# Patient Record
Sex: MALE | Race: WHITE | NOT HISPANIC OR LATINO
[De-identification: names, ages, dates, MRNs, and addresses within clinical notes are randomized per-mention and may not be internally consistent; named-entity substitution may affect disease eponyms.]

---

## 2017-01-03 ENCOUNTER — MESSAGE (OUTPATIENT)
Age: 62
End: 2017-01-03

## 2017-01-20 ENCOUNTER — RX RENEWAL (OUTPATIENT)
Age: 62
End: 2017-01-20

## 2017-02-03 ENCOUNTER — APPOINTMENT (OUTPATIENT)
Dept: INTERNAL MEDICINE | Facility: CLINIC | Age: 62
End: 2017-02-03

## 2017-02-10 ENCOUNTER — APPOINTMENT (OUTPATIENT)
Dept: INTERNAL MEDICINE | Facility: CLINIC | Age: 62
End: 2017-02-10

## 2017-02-10 VITALS — HEIGHT: 70 IN | BODY MASS INDEX: 34.36 KG/M2 | WEIGHT: 240 LBS

## 2017-02-10 VITALS
DIASTOLIC BLOOD PRESSURE: 80 MMHG | WEIGHT: 240 LBS | HEIGHT: 70 IN | HEART RATE: 82 BPM | SYSTOLIC BLOOD PRESSURE: 125 MMHG | BODY MASS INDEX: 34.36 KG/M2

## 2017-02-10 LAB — INR PPP: 3.2 RATIO

## 2017-02-27 ENCOUNTER — APPOINTMENT (OUTPATIENT)
Dept: OPHTHALMOLOGY | Facility: CLINIC | Age: 62
End: 2017-02-27

## 2017-02-28 ENCOUNTER — RX RENEWAL (OUTPATIENT)
Age: 62
End: 2017-02-28

## 2017-03-10 ENCOUNTER — RX RENEWAL (OUTPATIENT)
Age: 62
End: 2017-03-10

## 2017-03-27 ENCOUNTER — APPOINTMENT (OUTPATIENT)
Dept: INTERNAL MEDICINE | Facility: CLINIC | Age: 62
End: 2017-03-27

## 2017-03-27 ENCOUNTER — NON-APPOINTMENT (OUTPATIENT)
Age: 62
End: 2017-03-27

## 2017-03-27 VITALS
RESPIRATION RATE: 16 BRPM | HEIGHT: 70 IN | SYSTOLIC BLOOD PRESSURE: 132 MMHG | HEART RATE: 76 BPM | BODY MASS INDEX: 34.5 KG/M2 | WEIGHT: 241 LBS | DIASTOLIC BLOOD PRESSURE: 86 MMHG

## 2017-03-27 DIAGNOSIS — Z83.71 FAMILY HISTORY OF COLONIC POLYPS: ICD-10-CM

## 2017-03-27 DIAGNOSIS — Z80.0 FAMILY HISTORY OF MALIGNANT NEOPLASM OF DIGESTIVE ORGANS: ICD-10-CM

## 2017-03-27 LAB
INR PPP: 2 RATIO
QUALITY CONTROL: YES

## 2017-03-30 ENCOUNTER — RESULT REVIEW (OUTPATIENT)
Age: 62
End: 2017-03-30

## 2017-03-30 LAB
ALBUMIN SERPL ELPH-MCNC: 4.4 G/DL
ALP BLD-CCNC: 79 U/L
ALT SERPL-CCNC: 44 U/L
ANION GAP SERPL CALC-SCNC: 25 MMOL/L
APPEARANCE: CLEAR
AST SERPL-CCNC: 34 U/L
BACTERIA: NEGATIVE
BASOPHILS # BLD AUTO: 0.03 K/UL
BASOPHILS NFR BLD AUTO: 0.4 %
BILIRUB SERPL-MCNC: 0.2 MG/DL
BILIRUBIN URINE: NEGATIVE
BLOOD URINE: NEGATIVE
BSA DERIVED: 1.73 M2
BUN SERPL-MCNC: 17 MG/DL
CALCIUM SERPL-MCNC: 9.8 MG/DL
CHLORIDE SERPL-SCNC: 103 MMOL/L
CHOLEST SERPL-MCNC: 206 MG/DL
CHOLEST/HDLC SERPL: 5.6 RATIO
CO2 SERPL-SCNC: 15 MMOL/L
COLOR: YELLOW
CREAT 24H UR-MCNC: 2.4 G/24 H
CREAT 24H UR-MCNC: 2.4 G/24 H
CREAT ?TM UR-MCNC: 104 MG/DL
CREAT ?TM UR-MCNC: 104 MG/DL
CREAT CL 24H UR+SERPL-VRATE: 128 ML/MIN
CREAT SERPL-MCNC: 1.28 MG/DL
CREAT SERPL-MCNC: 1.28 MG/DL
DIGOXIN SERPL-MCNC: 0.4 NG/ML
EOSINOPHIL # BLD AUTO: 0.11 K/UL
EOSINOPHIL NFR BLD AUTO: 1.5 %
GLUCOSE QUALITATIVE U: NORMAL MG/DL
GLUCOSE SERPL-MCNC: 176 MG/DL
HBA1C MFR BLD HPLC: 7.9 %
HCT VFR BLD CALC: 41.1 %
HCV RNA FLD QL NAA+PROBE: NORMAL
HCV RNA SPEC QL PROBE+SIG AMP: NOT DETECTED
HDLC SERPL-MCNC: 37 MG/DL
HGB BLD-MCNC: 14 G/DL
HYALINE CASTS: 1 /LPF
IMM GRANULOCYTES NFR BLD AUTO: 0.6 %
KETONES URINE: NEGATIVE
LDLC SERPL CALC-MCNC: 120 MG/DL
LEUKOCYTE ESTERASE URINE: NEGATIVE
LYMPHOCYTES # BLD AUTO: 1.79 K/UL
LYMPHOCYTES NFR BLD AUTO: 25 %
MAN DIFF?: NORMAL
MCHC RBC-ENTMCNC: 27.9 PG
MCHC RBC-ENTMCNC: 34.1 GM/DL
MCV RBC AUTO: 82 FL
MICROSCOPIC-UA: NORMAL
MONOCYTES # BLD AUTO: 0.88 K/UL
MONOCYTES NFR BLD AUTO: 12.3 %
NEUTROPHILS # BLD AUTO: 4.31 K/UL
NEUTROPHILS NFR BLD AUTO: 60.2 %
NITRITE URINE: NEGATIVE
PH URINE: 5
PLATELET # BLD AUTO: 241 K/UL
POTASSIUM SERPL-SCNC: 5.2 MMOL/L
PROT 24H UR-MRATE: 18 MG/DL
PROT ?TM UR-MCNC: 24 HR
PROT ?TM UR-MCNC: 24 HR
PROT SERPL-MCNC: 7.8 G/DL
PROT UR-MCNC: 410 MG/24 H
PROTEIN URINE: 30 MG/DL
PSA SERPL-MCNC: 0.91 NG/ML
RBC # BLD: 5.01 M/UL
RBC # FLD: 15.4 %
RED BLOOD CELLS URINE: 2 /HPF
SODIUM SERPL-SCNC: 143 MMOL/L
SPECIFIC GRAVITY URINE: 1.03
SPECIMEN VOL 24H UR: 2275 ML
SPECIMEN VOL 24H UR: 2275 ML
SQUAMOUS EPITHELIAL CELLS: 0 /HPF
TRIGL SERPL-MCNC: 244 MG/DL
UROBILINOGEN URINE: NORMAL MG/DL
WBC # FLD AUTO: 7.16 K/UL
WHITE BLOOD CELLS URINE: 1 /HPF

## 2017-04-17 ENCOUNTER — RX RENEWAL (OUTPATIENT)
Age: 62
End: 2017-04-17

## 2017-04-24 ENCOUNTER — APPOINTMENT (OUTPATIENT)
Dept: INTERNAL MEDICINE | Facility: CLINIC | Age: 62
End: 2017-04-24

## 2017-04-24 VITALS
DIASTOLIC BLOOD PRESSURE: 80 MMHG | BODY MASS INDEX: 34.65 KG/M2 | SYSTOLIC BLOOD PRESSURE: 125 MMHG | HEIGHT: 70 IN | HEART RATE: 72 BPM | WEIGHT: 242 LBS | OXYGEN SATURATION: 98 %

## 2017-04-24 LAB — INR PPP: 2.3 RATIO

## 2017-04-25 ENCOUNTER — MESSAGE (OUTPATIENT)
Age: 62
End: 2017-04-25

## 2017-04-25 LAB
ALBUMIN SERPL ELPH-MCNC: 4.1 G/DL
ALP BLD-CCNC: 71 U/L
ALT SERPL-CCNC: 50 U/L
AST SERPL-CCNC: 45 U/L
BILIRUB DIRECT SERPL-MCNC: 0.1 MG/DL
BILIRUB INDIRECT SERPL-MCNC: 0.2 MG/DL
BILIRUB SERPL-MCNC: 0.4 MG/DL
PROT SERPL-MCNC: 7.5 G/DL

## 2017-05-25 ENCOUNTER — APPOINTMENT (OUTPATIENT)
Dept: INTERNAL MEDICINE | Facility: CLINIC | Age: 62
End: 2017-05-25

## 2017-05-25 VITALS
HEART RATE: 90 BPM | BODY MASS INDEX: 34.36 KG/M2 | OXYGEN SATURATION: 99 % | DIASTOLIC BLOOD PRESSURE: 80 MMHG | HEIGHT: 70 IN | WEIGHT: 240 LBS | SYSTOLIC BLOOD PRESSURE: 130 MMHG

## 2017-05-25 LAB — INR PPP: 2.7 RATIO

## 2017-05-26 ENCOUNTER — RESULT REVIEW (OUTPATIENT)
Age: 62
End: 2017-05-26

## 2017-05-26 LAB
ALBUMIN SERPL ELPH-MCNC: 4.2 G/DL
ALP BLD-CCNC: 70 U/L
ALT SERPL-CCNC: 49 U/L
AST SERPL-CCNC: 30 U/L
BILIRUB DIRECT SERPL-MCNC: 0.1 MG/DL
BILIRUB INDIRECT SERPL-MCNC: 0.2 MG/DL
BILIRUB SERPL-MCNC: 0.3 MG/DL
PROT SERPL-MCNC: 7.5 G/DL

## 2017-06-07 ENCOUNTER — FORM ENCOUNTER (OUTPATIENT)
Age: 62
End: 2017-06-07

## 2017-06-08 ENCOUNTER — APPOINTMENT (OUTPATIENT)
Dept: ULTRASOUND IMAGING | Facility: CLINIC | Age: 62
End: 2017-06-08

## 2017-06-08 ENCOUNTER — OUTPATIENT (OUTPATIENT)
Dept: OUTPATIENT SERVICES | Facility: HOSPITAL | Age: 62
LOS: 1 days | End: 2017-06-08
Payer: COMMERCIAL

## 2017-06-08 DIAGNOSIS — R19.01 RIGHT UPPER QUADRANT ABDOMINAL SWELLING, MASS AND LUMP: ICD-10-CM

## 2017-06-08 PROCEDURE — 76700 US EXAM ABDOM COMPLETE: CPT

## 2017-06-09 ENCOUNTER — RESULT REVIEW (OUTPATIENT)
Age: 62
End: 2017-06-09

## 2017-06-30 ENCOUNTER — APPOINTMENT (OUTPATIENT)
Dept: PULMONOLOGY | Facility: CLINIC | Age: 62
End: 2017-06-30

## 2017-07-06 ENCOUNTER — RX RENEWAL (OUTPATIENT)
Age: 62
End: 2017-07-06

## 2017-07-10 ENCOUNTER — APPOINTMENT (OUTPATIENT)
Dept: INTERNAL MEDICINE | Facility: CLINIC | Age: 62
End: 2017-07-10

## 2017-07-19 ENCOUNTER — RX RENEWAL (OUTPATIENT)
Age: 62
End: 2017-07-19

## 2017-08-24 ENCOUNTER — APPOINTMENT (OUTPATIENT)
Dept: INTERNAL MEDICINE | Facility: CLINIC | Age: 62
End: 2017-08-24
Payer: COMMERCIAL

## 2017-08-24 VITALS
BODY MASS INDEX: 32.93 KG/M2 | DIASTOLIC BLOOD PRESSURE: 80 MMHG | WEIGHT: 230 LBS | HEIGHT: 70 IN | SYSTOLIC BLOOD PRESSURE: 125 MMHG | HEART RATE: 82 BPM

## 2017-08-24 LAB — INR PPP: 2.4 RATIO

## 2017-08-24 PROCEDURE — 99213 OFFICE O/P EST LOW 20 MIN: CPT | Mod: 25

## 2017-08-24 PROCEDURE — 85610 PROTHROMBIN TIME: CPT | Mod: QW

## 2017-08-24 PROCEDURE — 36415 COLL VENOUS BLD VENIPUNCTURE: CPT

## 2017-08-25 ENCOUNTER — RESULT REVIEW (OUTPATIENT)
Age: 62
End: 2017-08-25

## 2017-08-25 LAB
ALBUMIN SERPL ELPH-MCNC: 4 G/DL
ALP BLD-CCNC: 61 U/L
ALT SERPL-CCNC: 35 U/L
AST SERPL-CCNC: 37 U/L
BILIRUB DIRECT SERPL-MCNC: 0.1 MG/DL
BILIRUB INDIRECT SERPL-MCNC: 0.2 MG/DL
BILIRUB SERPL-MCNC: 0.3 MG/DL
PROT SERPL-MCNC: 7.2 G/DL

## 2017-08-29 ENCOUNTER — RX RENEWAL (OUTPATIENT)
Age: 62
End: 2017-08-29

## 2017-09-06 ENCOUNTER — RX RENEWAL (OUTPATIENT)
Age: 62
End: 2017-09-06

## 2017-09-14 ENCOUNTER — RX RENEWAL (OUTPATIENT)
Age: 62
End: 2017-09-14

## 2017-10-05 ENCOUNTER — APPOINTMENT (OUTPATIENT)
Dept: INTERNAL MEDICINE | Facility: CLINIC | Age: 62
End: 2017-10-05
Payer: COMMERCIAL

## 2017-10-05 VITALS
HEART RATE: 90 BPM | DIASTOLIC BLOOD PRESSURE: 80 MMHG | WEIGHT: 236 LBS | BODY MASS INDEX: 33.79 KG/M2 | SYSTOLIC BLOOD PRESSURE: 120 MMHG | OXYGEN SATURATION: 98 % | HEIGHT: 70 IN

## 2017-10-05 DIAGNOSIS — R79.89 OTHER SPECIFIED ABNORMAL FINDINGS OF BLOOD CHEMISTRY: ICD-10-CM

## 2017-10-05 LAB — INR PPP: 3.5 RATIO

## 2017-10-05 PROCEDURE — 36415 COLL VENOUS BLD VENIPUNCTURE: CPT

## 2017-10-05 PROCEDURE — G0008: CPT

## 2017-10-05 PROCEDURE — 99215 OFFICE O/P EST HI 40 MIN: CPT | Mod: 25

## 2017-10-05 PROCEDURE — 85610 PROTHROMBIN TIME: CPT | Mod: QW

## 2017-10-05 PROCEDURE — 90686 IIV4 VACC NO PRSV 0.5 ML IM: CPT

## 2017-10-06 ENCOUNTER — RESULT REVIEW (OUTPATIENT)
Age: 62
End: 2017-10-06

## 2017-10-06 LAB
ALBUMIN SERPL ELPH-MCNC: 4 G/DL
ALP BLD-CCNC: 73 U/L
ALT SERPL-CCNC: 41 U/L
ANION GAP SERPL CALC-SCNC: 14 MMOL/L
AST SERPL-CCNC: 36 U/L
BASOPHILS # BLD AUTO: 0.03 K/UL
BASOPHILS NFR BLD AUTO: 0.4 %
BILIRUB SERPL-MCNC: 0.3 MG/DL
BUN SERPL-MCNC: 24 MG/DL
CALCIUM SERPL-MCNC: 9.6 MG/DL
CHLORIDE SERPL-SCNC: 105 MMOL/L
CHOLEST SERPL-MCNC: 119 MG/DL
CHOLEST/HDLC SERPL: 3.7 RATIO
CO2 SERPL-SCNC: 20 MMOL/L
CREAT SERPL-MCNC: 1.6 MG/DL
DIGOXIN SERPL-MCNC: 0.4 NG/ML
EOSINOPHIL # BLD AUTO: 0.21 K/UL
EOSINOPHIL NFR BLD AUTO: 2.5 %
GLUCOSE SERPL-MCNC: 348 MG/DL
HBA1C MFR BLD HPLC: 8.5 %
HCT VFR BLD CALC: 40 %
HDLC SERPL-MCNC: 32 MG/DL
HGB BLD-MCNC: 13.2 G/DL
IMM GRANULOCYTES NFR BLD AUTO: 0.4 %
LDLC SERPL CALC-MCNC: 48 MG/DL
LYMPHOCYTES # BLD AUTO: 1.69 K/UL
LYMPHOCYTES NFR BLD AUTO: 19.9 %
MAGNESIUM SERPL-MCNC: 1.4 MG/DL
MAN DIFF?: NORMAL
MCHC RBC-ENTMCNC: 27.6 PG
MCHC RBC-ENTMCNC: 33 GM/DL
MCV RBC AUTO: 83.5 FL
MONOCYTES # BLD AUTO: 0.71 K/UL
MONOCYTES NFR BLD AUTO: 8.3 %
NEUTROPHILS # BLD AUTO: 5.84 K/UL
NEUTROPHILS NFR BLD AUTO: 68.5 %
PLATELET # BLD AUTO: 246 K/UL
POTASSIUM SERPL-SCNC: 4.8 MMOL/L
PROT SERPL-MCNC: 7.3 G/DL
RBC # BLD: 4.79 M/UL
RBC # FLD: 15.1 %
SODIUM SERPL-SCNC: 139 MMOL/L
TRIGL SERPL-MCNC: 197 MG/DL
TSH SERPL-ACNC: 1.79 UIU/ML
WBC # FLD AUTO: 8.51 K/UL

## 2017-11-14 ENCOUNTER — APPOINTMENT (OUTPATIENT)
Dept: INTERNAL MEDICINE | Facility: CLINIC | Age: 62
End: 2017-11-14
Payer: COMMERCIAL

## 2017-11-14 VITALS
HEART RATE: 86 BPM | DIASTOLIC BLOOD PRESSURE: 80 MMHG | HEIGHT: 70 IN | WEIGHT: 236 LBS | BODY MASS INDEX: 33.79 KG/M2 | SYSTOLIC BLOOD PRESSURE: 130 MMHG

## 2017-11-14 LAB — INR PPP: 2.6 RATIO

## 2017-11-14 PROCEDURE — 36415 COLL VENOUS BLD VENIPUNCTURE: CPT

## 2017-11-14 PROCEDURE — 85610 PROTHROMBIN TIME: CPT | Mod: QW

## 2017-11-14 PROCEDURE — 99214 OFFICE O/P EST MOD 30 MIN: CPT | Mod: 25

## 2017-11-15 ENCOUNTER — FORM ENCOUNTER (OUTPATIENT)
Age: 62
End: 2017-11-15

## 2017-11-16 ENCOUNTER — FORM ENCOUNTER (OUTPATIENT)
Age: 62
End: 2017-11-16

## 2017-11-16 ENCOUNTER — APPOINTMENT (OUTPATIENT)
Dept: ULTRASOUND IMAGING | Facility: CLINIC | Age: 62
End: 2017-11-16
Payer: COMMERCIAL

## 2017-11-16 ENCOUNTER — RESULT REVIEW (OUTPATIENT)
Age: 62
End: 2017-11-16

## 2017-11-16 ENCOUNTER — OUTPATIENT (OUTPATIENT)
Dept: OUTPATIENT SERVICES | Facility: HOSPITAL | Age: 62
LOS: 1 days | End: 2017-11-16
Payer: COMMERCIAL

## 2017-11-16 DIAGNOSIS — Z00.8 ENCOUNTER FOR OTHER GENERAL EXAMINATION: ICD-10-CM

## 2017-11-16 LAB
ANION GAP SERPL CALC-SCNC: 18 MMOL/L
BUN SERPL-MCNC: 20 MG/DL
CALCIUM SERPL-MCNC: 9.8 MG/DL
CHLORIDE SERPL-SCNC: 101 MMOL/L
CO2 SERPL-SCNC: 22 MMOL/L
CREAT SERPL-MCNC: 1.43 MG/DL
GLUCOSE SERPL-MCNC: 195 MG/DL
MAGNESIUM SERPL-MCNC: 1.7 MG/DL
POTASSIUM SERPL-SCNC: 4.4 MMOL/L
SODIUM SERPL-SCNC: 141 MMOL/L

## 2017-11-16 PROCEDURE — 93971 EXTREMITY STUDY: CPT | Mod: 26,RT

## 2017-11-16 PROCEDURE — 93971 EXTREMITY STUDY: CPT

## 2017-11-17 ENCOUNTER — APPOINTMENT (OUTPATIENT)
Dept: ULTRASOUND IMAGING | Facility: CLINIC | Age: 62
End: 2017-11-17
Payer: COMMERCIAL

## 2017-11-17 ENCOUNTER — OUTPATIENT (OUTPATIENT)
Dept: OUTPATIENT SERVICES | Facility: HOSPITAL | Age: 62
LOS: 1 days | End: 2017-11-17
Payer: COMMERCIAL

## 2017-11-17 DIAGNOSIS — Z00.8 ENCOUNTER FOR OTHER GENERAL EXAMINATION: ICD-10-CM

## 2017-11-17 PROCEDURE — 76881 US COMPL JOINT R-T W/IMG: CPT

## 2017-11-17 PROCEDURE — 76881 US COMPL JOINT R-T W/IMG: CPT | Mod: 26

## 2017-11-20 ENCOUNTER — RESULT REVIEW (OUTPATIENT)
Age: 62
End: 2017-11-20

## 2017-12-18 ENCOUNTER — APPOINTMENT (OUTPATIENT)
Dept: INTERNAL MEDICINE | Facility: CLINIC | Age: 62
End: 2017-12-18

## 2017-12-29 ENCOUNTER — APPOINTMENT (OUTPATIENT)
Dept: INTERNAL MEDICINE | Facility: CLINIC | Age: 62
End: 2017-12-29
Payer: COMMERCIAL

## 2017-12-29 ENCOUNTER — RESULT CHARGE (OUTPATIENT)
Age: 62
End: 2017-12-29

## 2017-12-29 VITALS
DIASTOLIC BLOOD PRESSURE: 80 MMHG | WEIGHT: 235 LBS | BODY MASS INDEX: 33.64 KG/M2 | SYSTOLIC BLOOD PRESSURE: 134 MMHG | HEIGHT: 70 IN | RESPIRATION RATE: 12 BRPM | HEART RATE: 76 BPM

## 2017-12-29 DIAGNOSIS — M79.661 PAIN IN RIGHT LOWER LEG: ICD-10-CM

## 2017-12-29 LAB — INR PPP: 2.7 RATIO

## 2017-12-29 PROCEDURE — 99213 OFFICE O/P EST LOW 20 MIN: CPT | Mod: 25

## 2017-12-29 PROCEDURE — 85610 PROTHROMBIN TIME: CPT | Mod: QW

## 2018-01-29 ENCOUNTER — APPOINTMENT (OUTPATIENT)
Dept: INTERNAL MEDICINE | Facility: CLINIC | Age: 63
End: 2018-01-29
Payer: COMMERCIAL

## 2018-01-29 VITALS
HEIGHT: 70 IN | HEART RATE: 68 BPM | BODY MASS INDEX: 34.36 KG/M2 | WEIGHT: 240 LBS | OXYGEN SATURATION: 98 % | SYSTOLIC BLOOD PRESSURE: 125 MMHG | DIASTOLIC BLOOD PRESSURE: 80 MMHG

## 2018-01-29 LAB — INR PPP: 2.7 RATIO

## 2018-01-29 PROCEDURE — 36415 COLL VENOUS BLD VENIPUNCTURE: CPT

## 2018-01-29 PROCEDURE — 85610 PROTHROMBIN TIME: CPT | Mod: QW

## 2018-01-29 PROCEDURE — 99214 OFFICE O/P EST MOD 30 MIN: CPT | Mod: 25

## 2018-01-30 LAB
ALBUMIN SERPL ELPH-MCNC: 4.2 G/DL
ALP BLD-CCNC: 72 U/L
ALT SERPL-CCNC: 38 U/L
ANION GAP SERPL CALC-SCNC: 20 MMOL/L
AST SERPL-CCNC: 27 U/L
BASOPHILS # BLD AUTO: 0.03 K/UL
BASOPHILS NFR BLD AUTO: 0.4 %
BILIRUB SERPL-MCNC: 0.3 MG/DL
BUN SERPL-MCNC: 19 MG/DL
CALCIUM SERPL-MCNC: 9.4 MG/DL
CHLORIDE SERPL-SCNC: 101 MMOL/L
CHOLEST SERPL-MCNC: 122 MG/DL
CHOLEST/HDLC SERPL: 3.8 RATIO
CO2 SERPL-SCNC: 20 MMOL/L
CREAT SERPL-MCNC: 1.37 MG/DL
DIGOXIN SERPL-MCNC: 0.3 NG/ML
EOSINOPHIL # BLD AUTO: 0.17 K/UL
EOSINOPHIL NFR BLD AUTO: 2.1 %
GLUCOSE SERPL-MCNC: 268 MG/DL
HBA1C MFR BLD HPLC: 9.8 %
HCT VFR BLD CALC: 41.4 %
HDLC SERPL-MCNC: 32 MG/DL
HGB BLD-MCNC: 13.3 G/DL
IMM GRANULOCYTES NFR BLD AUTO: 0.7 %
LDLC SERPL CALC-MCNC: 46 MG/DL
LYMPHOCYTES # BLD AUTO: 1.77 K/UL
LYMPHOCYTES NFR BLD AUTO: 22 %
MAGNESIUM SERPL-MCNC: 1.5 MG/DL
MAN DIFF?: NORMAL
MCHC RBC-ENTMCNC: 27 PG
MCHC RBC-ENTMCNC: 32.1 GM/DL
MCV RBC AUTO: 84.1 FL
MONOCYTES # BLD AUTO: 0.71 K/UL
MONOCYTES NFR BLD AUTO: 8.8 %
NEUTROPHILS # BLD AUTO: 5.29 K/UL
NEUTROPHILS NFR BLD AUTO: 66 %
PLATELET # BLD AUTO: 230 K/UL
POTASSIUM SERPL-SCNC: 4.5 MMOL/L
PROT SERPL-MCNC: 7.4 G/DL
RBC # BLD: 4.92 M/UL
RBC # FLD: 15.2 %
SODIUM SERPL-SCNC: 141 MMOL/L
TRIGL SERPL-MCNC: 221 MG/DL
TSH SERPL-ACNC: 2.28 UIU/ML
WBC # FLD AUTO: 8.03 K/UL

## 2018-01-31 ENCOUNTER — RX RENEWAL (OUTPATIENT)
Age: 63
End: 2018-01-31

## 2018-01-31 ENCOUNTER — RESULT REVIEW (OUTPATIENT)
Age: 63
End: 2018-01-31

## 2018-02-01 ENCOUNTER — FORM ENCOUNTER (OUTPATIENT)
Age: 63
End: 2018-02-01

## 2018-02-02 ENCOUNTER — OUTPATIENT (OUTPATIENT)
Dept: OUTPATIENT SERVICES | Facility: HOSPITAL | Age: 63
LOS: 1 days | End: 2018-02-02
Payer: COMMERCIAL

## 2018-02-02 ENCOUNTER — APPOINTMENT (OUTPATIENT)
Dept: INTERNAL MEDICINE | Facility: CLINIC | Age: 63
End: 2018-02-02
Payer: COMMERCIAL

## 2018-02-02 ENCOUNTER — APPOINTMENT (OUTPATIENT)
Dept: CT IMAGING | Facility: CLINIC | Age: 63
End: 2018-02-02
Payer: COMMERCIAL

## 2018-02-02 VITALS
HEIGHT: 70 IN | TEMPERATURE: 97.8 F | DIASTOLIC BLOOD PRESSURE: 80 MMHG | HEART RATE: 83 BPM | OXYGEN SATURATION: 97 % | SYSTOLIC BLOOD PRESSURE: 130 MMHG | WEIGHT: 240 LBS | BODY MASS INDEX: 34.36 KG/M2

## 2018-02-02 DIAGNOSIS — R10.31 RIGHT LOWER QUADRANT PAIN: ICD-10-CM

## 2018-02-02 PROCEDURE — 74177 CT ABD & PELVIS W/CONTRAST: CPT | Mod: 26

## 2018-02-02 PROCEDURE — 74177 CT ABD & PELVIS W/CONTRAST: CPT

## 2018-02-02 PROCEDURE — 99214 OFFICE O/P EST MOD 30 MIN: CPT

## 2018-02-02 RX ORDER — EMPAGLIFLOZIN 10 MG/1
10 TABLET, FILM COATED ORAL
Refills: 0 | Status: DISCONTINUED | COMMUNITY
Start: 2018-01-31 | End: 2018-02-02

## 2018-02-06 ENCOUNTER — APPOINTMENT (OUTPATIENT)
Dept: INTERNAL MEDICINE | Facility: CLINIC | Age: 63
End: 2018-02-06
Payer: COMMERCIAL

## 2018-02-06 ENCOUNTER — FORM ENCOUNTER (OUTPATIENT)
Age: 63
End: 2018-02-06

## 2018-02-06 VITALS
WEIGHT: 234 LBS | DIASTOLIC BLOOD PRESSURE: 80 MMHG | SYSTOLIC BLOOD PRESSURE: 134 MMHG | HEART RATE: 84 BPM | BODY MASS INDEX: 33.5 KG/M2 | HEIGHT: 70 IN

## 2018-02-06 DIAGNOSIS — R10.31 RIGHT LOWER QUADRANT PAIN: ICD-10-CM

## 2018-02-06 LAB — INR PPP: 3.4 RATIO

## 2018-02-06 PROCEDURE — 99214 OFFICE O/P EST MOD 30 MIN: CPT | Mod: 25

## 2018-02-06 PROCEDURE — 36415 COLL VENOUS BLD VENIPUNCTURE: CPT

## 2018-02-06 PROCEDURE — 85610 PROTHROMBIN TIME: CPT | Mod: QW

## 2018-02-06 RX ORDER — MOXIFLOXACIN HYDROCHLORIDE TABLETS, 400 MG 400 MG/1
400 TABLET, FILM COATED ORAL DAILY
Qty: 10 | Refills: 0 | Status: DISCONTINUED | COMMUNITY
Start: 2018-02-02 | End: 2018-02-06

## 2018-02-07 ENCOUNTER — RESULT REVIEW (OUTPATIENT)
Age: 63
End: 2018-02-07

## 2018-02-07 ENCOUNTER — APPOINTMENT (OUTPATIENT)
Dept: MRI IMAGING | Facility: CLINIC | Age: 63
End: 2018-02-07
Payer: COMMERCIAL

## 2018-02-07 ENCOUNTER — RX RENEWAL (OUTPATIENT)
Age: 63
End: 2018-02-07

## 2018-02-07 ENCOUNTER — OUTPATIENT (OUTPATIENT)
Dept: OUTPATIENT SERVICES | Facility: HOSPITAL | Age: 63
LOS: 1 days | End: 2018-02-07
Payer: COMMERCIAL

## 2018-02-07 DIAGNOSIS — N28.89 OTHER SPECIFIED DISORDERS OF KIDNEY AND URETER: ICD-10-CM

## 2018-02-07 DIAGNOSIS — R10.31 RIGHT LOWER QUADRANT PAIN: ICD-10-CM

## 2018-02-07 LAB
ALBUMIN SERPL ELPH-MCNC: 4 G/DL
ALP BLD-CCNC: 77 U/L
ALT SERPL-CCNC: 39 U/L
ANION GAP SERPL CALC-SCNC: 20 MMOL/L
AST SERPL-CCNC: 34 U/L
BASOPHILS # BLD AUTO: 0.03 K/UL
BASOPHILS NFR BLD AUTO: 0.3 %
BILIRUB SERPL-MCNC: 0.3 MG/DL
BUN SERPL-MCNC: 17 MG/DL
CALCIUM SERPL-MCNC: 9.5 MG/DL
CHLORIDE SERPL-SCNC: 99 MMOL/L
CO2 SERPL-SCNC: 21 MMOL/L
CREAT SERPL-MCNC: 1.47 MG/DL
EOSINOPHIL # BLD AUTO: 0.19 K/UL
EOSINOPHIL NFR BLD AUTO: 2.1 %
GLUCOSE SERPL-MCNC: 323 MG/DL
HCT VFR BLD CALC: 39.7 %
HGB BLD-MCNC: 13 G/DL
IMM GRANULOCYTES NFR BLD AUTO: 0.6 %
LYMPHOCYTES # BLD AUTO: 1.55 K/UL
LYMPHOCYTES NFR BLD AUTO: 17.2 %
MAN DIFF?: NORMAL
MCHC RBC-ENTMCNC: 26.6 PG
MCHC RBC-ENTMCNC: 32.7 GM/DL
MCV RBC AUTO: 81.4 FL
MONOCYTES # BLD AUTO: 1.02 K/UL
MONOCYTES NFR BLD AUTO: 11.3 %
NEUTROPHILS # BLD AUTO: 6.19 K/UL
NEUTROPHILS NFR BLD AUTO: 68.5 %
PLATELET # BLD AUTO: 293 K/UL
POTASSIUM SERPL-SCNC: 4.9 MMOL/L
PROT SERPL-MCNC: 7.5 G/DL
RBC # BLD: 4.88 M/UL
RBC # FLD: 14.9 %
SODIUM SERPL-SCNC: 140 MMOL/L
WBC # FLD AUTO: 9.03 K/UL

## 2018-02-07 PROCEDURE — 74183 MRI ABD W/O CNTR FLWD CNTR: CPT | Mod: 26

## 2018-02-07 PROCEDURE — 70030 X-RAY EYE FOR FOREIGN BODY: CPT

## 2018-02-07 PROCEDURE — A9585: CPT

## 2018-02-07 PROCEDURE — 82565 ASSAY OF CREATININE: CPT

## 2018-02-07 PROCEDURE — 70030 X-RAY EYE FOR FOREIGN BODY: CPT | Mod: 26,50

## 2018-02-07 PROCEDURE — 74183 MRI ABD W/O CNTR FLWD CNTR: CPT

## 2018-02-21 ENCOUNTER — APPOINTMENT (OUTPATIENT)
Dept: GASTROENTEROLOGY | Facility: CLINIC | Age: 63
End: 2018-02-21
Payer: COMMERCIAL

## 2018-02-21 VITALS
HEART RATE: 87 BPM | WEIGHT: 240 LBS | RESPIRATION RATE: 14 BRPM | SYSTOLIC BLOOD PRESSURE: 148 MMHG | HEIGHT: 70 IN | BODY MASS INDEX: 34.36 KG/M2 | DIASTOLIC BLOOD PRESSURE: 97 MMHG

## 2018-02-21 DIAGNOSIS — Z87.891 PERSONAL HISTORY OF NICOTINE DEPENDENCE: ICD-10-CM

## 2018-02-21 DIAGNOSIS — Z83.3 FAMILY HISTORY OF DIABETES MELLITUS: ICD-10-CM

## 2018-02-21 PROCEDURE — 99214 OFFICE O/P EST MOD 30 MIN: CPT

## 2018-02-22 ENCOUNTER — FORM ENCOUNTER (OUTPATIENT)
Age: 63
End: 2018-02-22

## 2018-02-23 ENCOUNTER — APPOINTMENT (OUTPATIENT)
Dept: UROLOGY | Facility: CLINIC | Age: 63
End: 2018-02-23
Payer: COMMERCIAL

## 2018-02-23 ENCOUNTER — APPOINTMENT (OUTPATIENT)
Dept: CT IMAGING | Facility: IMAGING CENTER | Age: 63
End: 2018-02-23
Payer: COMMERCIAL

## 2018-02-23 ENCOUNTER — OUTPATIENT (OUTPATIENT)
Dept: OUTPATIENT SERVICES | Facility: HOSPITAL | Age: 63
LOS: 1 days | End: 2018-02-23
Payer: COMMERCIAL

## 2018-02-23 VITALS
DIASTOLIC BLOOD PRESSURE: 84 MMHG | RESPIRATION RATE: 17 BRPM | TEMPERATURE: 98.3 F | SYSTOLIC BLOOD PRESSURE: 145 MMHG | BODY MASS INDEX: 34.36 KG/M2 | WEIGHT: 240 LBS | HEIGHT: 70 IN | HEART RATE: 89 BPM

## 2018-02-23 DIAGNOSIS — N28.89 OTHER SPECIFIED DISORDERS OF KIDNEY AND URETER: ICD-10-CM

## 2018-02-23 DIAGNOSIS — Z86.39 PERSONAL HISTORY OF OTHER ENDOCRINE, NUTRITIONAL AND METABOLIC DISEASE: ICD-10-CM

## 2018-02-23 PROCEDURE — 82565 ASSAY OF CREATININE: CPT

## 2018-02-23 PROCEDURE — 71260 CT THORAX DX C+: CPT | Mod: 26

## 2018-02-23 PROCEDURE — 99204 OFFICE O/P NEW MOD 45 MIN: CPT

## 2018-02-23 PROCEDURE — 71260 CT THORAX DX C+: CPT

## 2018-02-23 RX ORDER — ZINC SULFATE 50(220)MG
CAPSULE ORAL
Refills: 0 | Status: ACTIVE | COMMUNITY

## 2018-02-28 ENCOUNTER — APPOINTMENT (OUTPATIENT)
Dept: INTERNAL MEDICINE | Facility: CLINIC | Age: 63
End: 2018-02-28
Payer: MEDICARE

## 2018-02-28 VITALS
WEIGHT: 240 LBS | BODY MASS INDEX: 34.36 KG/M2 | HEART RATE: 96 BPM | DIASTOLIC BLOOD PRESSURE: 82 MMHG | OXYGEN SATURATION: 99 % | HEIGHT: 70 IN | SYSTOLIC BLOOD PRESSURE: 130 MMHG

## 2018-02-28 LAB — INR PPP: 3.1 RATIO

## 2018-02-28 PROCEDURE — 85610 PROTHROMBIN TIME: CPT | Mod: QW

## 2018-02-28 PROCEDURE — 99213 OFFICE O/P EST LOW 20 MIN: CPT | Mod: 25

## 2018-03-15 ENCOUNTER — RX RENEWAL (OUTPATIENT)
Age: 63
End: 2018-03-15

## 2018-03-20 ENCOUNTER — APPOINTMENT (OUTPATIENT)
Dept: INTERNAL MEDICINE | Facility: CLINIC | Age: 63
End: 2018-03-20
Payer: COMMERCIAL

## 2018-03-20 VITALS
HEART RATE: 80 BPM | SYSTOLIC BLOOD PRESSURE: 137 MMHG | HEIGHT: 70 IN | RESPIRATION RATE: 16 BRPM | DIASTOLIC BLOOD PRESSURE: 80 MMHG | WEIGHT: 239 LBS | BODY MASS INDEX: 34.22 KG/M2

## 2018-03-20 DIAGNOSIS — R19.01 RIGHT UPPER QUADRANT ABDOMINAL SWELLING, MASS AND LUMP: ICD-10-CM

## 2018-03-20 DIAGNOSIS — K52.9 NONINFECTIVE GASTROENTERITIS AND COLITIS, UNSPECIFIED: ICD-10-CM

## 2018-03-20 PROCEDURE — 99214 OFFICE O/P EST MOD 30 MIN: CPT

## 2018-03-23 ENCOUNTER — APPOINTMENT (OUTPATIENT)
Dept: INTERNAL MEDICINE | Facility: CLINIC | Age: 63
End: 2018-03-23
Payer: COMMERCIAL

## 2018-03-23 VITALS
HEART RATE: 76 BPM | WEIGHT: 234 LBS | HEIGHT: 70 IN | RESPIRATION RATE: 14 BRPM | SYSTOLIC BLOOD PRESSURE: 126 MMHG | BODY MASS INDEX: 33.5 KG/M2 | DIASTOLIC BLOOD PRESSURE: 80 MMHG

## 2018-03-23 LAB — INR PPP: 1.1 RATIO

## 2018-03-23 PROCEDURE — 99213 OFFICE O/P EST LOW 20 MIN: CPT | Mod: 25

## 2018-03-23 PROCEDURE — 85610 PROTHROMBIN TIME: CPT | Mod: QW

## 2018-03-23 RX ORDER — EMPAGLIFLOZIN 10 MG/1
10 TABLET, FILM COATED ORAL DAILY
Qty: 30 | Refills: 5 | Status: DISCONTINUED | COMMUNITY
Start: 2018-01-31 | End: 2018-03-23

## 2018-03-25 LAB
APTT BLD: 30 SEC
INR PPP: 1.11 RATIO
PT BLD: 12.6 SEC

## 2018-03-26 ENCOUNTER — RESULT REVIEW (OUTPATIENT)
Age: 63
End: 2018-03-26

## 2018-03-26 ENCOUNTER — APPOINTMENT (OUTPATIENT)
Dept: GASTROENTEROLOGY | Facility: GI CENTER | Age: 63
End: 2018-03-26
Payer: COMMERCIAL

## 2018-03-26 ENCOUNTER — OUTPATIENT (OUTPATIENT)
Dept: OUTPATIENT SERVICES | Facility: HOSPITAL | Age: 63
LOS: 1 days | End: 2018-03-26
Payer: COMMERCIAL

## 2018-03-26 DIAGNOSIS — K57.30 DIVERTICULOSIS OF LARGE INTESTINE W/OUT PERFORATION OR ABSCESS W/OUT BLEEDING: ICD-10-CM

## 2018-03-26 DIAGNOSIS — R93.3 ABNORMAL FINDINGS ON DIAGNOSTIC IMAGING OF OTHER PARTS OF DIGESTIVE TRACT: ICD-10-CM

## 2018-03-26 DIAGNOSIS — K64.8 OTHER HEMORRHOIDS: ICD-10-CM

## 2018-03-26 LAB — GLUCOSE BLDC GLUCOMTR-MCNC: 181 MG/DL — HIGH (ref 70–99)

## 2018-03-26 PROCEDURE — 88305 TISSUE EXAM BY PATHOLOGIST: CPT

## 2018-03-26 PROCEDURE — 88305 TISSUE EXAM BY PATHOLOGIST: CPT | Mod: 26

## 2018-03-26 PROCEDURE — 82962 GLUCOSE BLOOD TEST: CPT

## 2018-03-26 PROCEDURE — 45385 COLONOSCOPY W/LESION REMOVAL: CPT

## 2018-03-27 ENCOUNTER — RX RENEWAL (OUTPATIENT)
Age: 63
End: 2018-03-27

## 2018-03-28 LAB — SURGICAL PATHOLOGY FINAL REPORT - CH: SIGNIFICANT CHANGE UP

## 2018-03-29 ENCOUNTER — APPOINTMENT (OUTPATIENT)
Dept: INTERNAL MEDICINE | Facility: CLINIC | Age: 63
End: 2018-03-29
Payer: COMMERCIAL

## 2018-03-29 VITALS
HEIGHT: 70 IN | HEART RATE: 94 BPM | BODY MASS INDEX: 34.07 KG/M2 | DIASTOLIC BLOOD PRESSURE: 70 MMHG | RESPIRATION RATE: 14 BRPM | SYSTOLIC BLOOD PRESSURE: 128 MMHG | WEIGHT: 238 LBS | OXYGEN SATURATION: 98 %

## 2018-03-29 LAB — INR PPP: 1 RATIO

## 2018-03-29 PROCEDURE — 85610 PROTHROMBIN TIME: CPT | Mod: QW

## 2018-03-29 PROCEDURE — 99213 OFFICE O/P EST LOW 20 MIN: CPT | Mod: 25

## 2018-03-30 ENCOUNTER — APPOINTMENT (OUTPATIENT)
Dept: INTERNAL MEDICINE | Facility: CLINIC | Age: 63
End: 2018-03-30
Payer: COMMERCIAL

## 2018-03-30 VITALS
RESPIRATION RATE: 14 BRPM | DIASTOLIC BLOOD PRESSURE: 80 MMHG | HEIGHT: 70 IN | BODY MASS INDEX: 33.93 KG/M2 | WEIGHT: 237 LBS | SYSTOLIC BLOOD PRESSURE: 123 MMHG | HEART RATE: 70 BPM

## 2018-03-30 LAB — INR PPP: 1.2 RATIO

## 2018-03-30 PROCEDURE — 99213 OFFICE O/P EST LOW 20 MIN: CPT | Mod: 25

## 2018-03-30 PROCEDURE — 85610 PROTHROMBIN TIME: CPT | Mod: QW

## 2018-03-31 ENCOUNTER — LABORATORY RESULT (OUTPATIENT)
Age: 63
End: 2018-03-31

## 2018-04-06 ENCOUNTER — OUTPATIENT (OUTPATIENT)
Dept: OUTPATIENT SERVICES | Facility: HOSPITAL | Age: 63
LOS: 1 days | End: 2018-04-06

## 2018-04-06 VITALS
OXYGEN SATURATION: 98 % | DIASTOLIC BLOOD PRESSURE: 72 MMHG | SYSTOLIC BLOOD PRESSURE: 124 MMHG | WEIGHT: 238.98 LBS | TEMPERATURE: 97 F | HEART RATE: 76 BPM | HEIGHT: 69 IN | RESPIRATION RATE: 14 BRPM

## 2018-04-06 DIAGNOSIS — I48.91 UNSPECIFIED ATRIAL FIBRILLATION: ICD-10-CM

## 2018-04-06 DIAGNOSIS — N28.89 OTHER SPECIFIED DISORDERS OF KIDNEY AND URETER: ICD-10-CM

## 2018-04-06 DIAGNOSIS — Z98.890 OTHER SPECIFIED POSTPROCEDURAL STATES: Chronic | ICD-10-CM

## 2018-04-06 DIAGNOSIS — E11.9 TYPE 2 DIABETES MELLITUS WITHOUT COMPLICATIONS: ICD-10-CM

## 2018-04-06 DIAGNOSIS — I10 ESSENTIAL (PRIMARY) HYPERTENSION: ICD-10-CM

## 2018-04-06 DIAGNOSIS — G47.33 OBSTRUCTIVE SLEEP APNEA (ADULT) (PEDIATRIC): ICD-10-CM

## 2018-04-06 LAB
ALBUMIN SERPL ELPH-MCNC: 4.4 G/DL — SIGNIFICANT CHANGE UP (ref 3.3–5)
ALP SERPL-CCNC: 77 U/L — SIGNIFICANT CHANGE UP (ref 40–120)
ALT FLD-CCNC: 70 U/L — HIGH (ref 4–41)
APPEARANCE UR: CLEAR — SIGNIFICANT CHANGE UP
APTT BLD: 40.1 SEC — HIGH (ref 27.5–37.4)
AST SERPL-CCNC: 42 U/L — HIGH (ref 4–40)
BILIRUB SERPL-MCNC: 0.3 MG/DL — SIGNIFICANT CHANGE UP (ref 0.2–1.2)
BILIRUB UR-MCNC: NEGATIVE — SIGNIFICANT CHANGE UP
BLD GP AB SCN SERPL QL: NEGATIVE — SIGNIFICANT CHANGE UP
BLOOD UR QL VISUAL: NEGATIVE — SIGNIFICANT CHANGE UP
BUN SERPL-MCNC: 21 MG/DL — SIGNIFICANT CHANGE UP (ref 7–23)
CALCIUM SERPL-MCNC: 9.2 MG/DL — SIGNIFICANT CHANGE UP (ref 8.4–10.5)
CHLORIDE SERPL-SCNC: 99 MMOL/L — SIGNIFICANT CHANGE UP (ref 98–107)
CO2 SERPL-SCNC: 23 MMOL/L — SIGNIFICANT CHANGE UP (ref 22–31)
COLOR SPEC: YELLOW — SIGNIFICANT CHANGE UP
CREAT SERPL-MCNC: 1.26 MG/DL — SIGNIFICANT CHANGE UP (ref 0.5–1.3)
GLUCOSE SERPL-MCNC: 279 MG/DL — HIGH (ref 70–99)
GLUCOSE UR-MCNC: >1000 — SIGNIFICANT CHANGE UP
HBA1C BLD-MCNC: 8.9 % — HIGH (ref 4–5.6)
HCT VFR BLD CALC: 42.3 % — SIGNIFICANT CHANGE UP (ref 39–50)
HGB BLD-MCNC: 13.9 G/DL — SIGNIFICANT CHANGE UP (ref 13–17)
HYALINE CASTS # UR AUTO: SIGNIFICANT CHANGE UP (ref 0–?)
INR BLD: 1.78 — HIGH (ref 0.88–1.17)
KETONES UR-MCNC: SIGNIFICANT CHANGE UP
LEUKOCYTE ESTERASE UR-ACNC: NEGATIVE — SIGNIFICANT CHANGE UP
MCHC RBC-ENTMCNC: 26.7 PG — LOW (ref 27–34)
MCHC RBC-ENTMCNC: 32.9 % — SIGNIFICANT CHANGE UP (ref 32–36)
MCV RBC AUTO: 81.2 FL — SIGNIFICANT CHANGE UP (ref 80–100)
MUCOUS THREADS # UR AUTO: SIGNIFICANT CHANGE UP
NITRITE UR-MCNC: NEGATIVE — SIGNIFICANT CHANGE UP
NRBC # FLD: 0 — SIGNIFICANT CHANGE UP
PH UR: 6 — SIGNIFICANT CHANGE UP (ref 4.6–8)
PLATELET # BLD AUTO: 275 K/UL — SIGNIFICANT CHANGE UP (ref 150–400)
PMV BLD: 10.2 FL — SIGNIFICANT CHANGE UP (ref 7–13)
POTASSIUM SERPL-MCNC: 4.4 MMOL/L — SIGNIFICANT CHANGE UP (ref 3.5–5.3)
POTASSIUM SERPL-SCNC: 4.4 MMOL/L — SIGNIFICANT CHANGE UP (ref 3.5–5.3)
PROT SERPL-MCNC: 7.9 G/DL — SIGNIFICANT CHANGE UP (ref 6–8.3)
PROT UR-MCNC: 30 MG/DL — HIGH
PROTHROM AB SERPL-ACNC: 20 SEC — HIGH (ref 9.8–13.1)
RBC # BLD: 5.21 M/UL — SIGNIFICANT CHANGE UP (ref 4.2–5.8)
RBC # FLD: 14.6 % — HIGH (ref 10.3–14.5)
RBC CASTS # UR COMP ASSIST: SIGNIFICANT CHANGE UP (ref 0–?)
RH IG SCN BLD-IMP: POSITIVE — SIGNIFICANT CHANGE UP
SODIUM SERPL-SCNC: 139 MMOL/L — SIGNIFICANT CHANGE UP (ref 135–145)
SP GR SPEC: 1.02 — SIGNIFICANT CHANGE UP (ref 1–1.04)
UROBILINOGEN FLD QL: NORMAL MG/DL — SIGNIFICANT CHANGE UP
WBC # BLD: 8.54 K/UL — SIGNIFICANT CHANGE UP (ref 3.8–10.5)
WBC # FLD AUTO: 8.54 K/UL — SIGNIFICANT CHANGE UP (ref 3.8–10.5)
WBC UR QL: SIGNIFICANT CHANGE UP (ref 0–?)

## 2018-04-06 NOTE — H&P PST ADULT - PROBLEM SELECTOR PLAN 4
Pt instructed to hold coumadin 7 days before surgery, last dose 04/09/18 as per PCP Dr. Alvares, and cardiology recommendations, pt reports he is being bridged on lovenox startin 04/12/18, INR repeat scheduled for 04/12/18 with PCP  cardiac matilda in chart Pt instructed to hold coumadin 7 days before surgery, last dose 04/09/18 as per PCP Dr. Alvares, and cardiology recommendations, pt reports he is being bridged on Lovenox starting 04/12/18, INR repeat scheduled for 04/12/18 with PCP  cardiac matilda in chart

## 2018-04-06 NOTE — H&P PST ADULT - PMH
Diabetes mellitus  type 2  Hyperlipidemia    Hypertension    Other specified disorders of kidney and ureter Cardiomyopathy    Diabetes mellitus  type 2  Hypertension    MARIANA on CPAP    Other specified disorders of kidney and ureter  left kidney mass Atrial fibrillation    Cardiomyopathy    Diabetes mellitus  type 2  Hypertension    MARIANA on CPAP    Other specified disorders of kidney and ureter  left kidney mass

## 2018-04-06 NOTE — H&P PST ADULT - REASON FOR ADMISSION
"I'm having left kidney mass removal and partial kidney removal" "I'm having left kidney mass removal"

## 2018-04-06 NOTE — H&P PST ADULT - HISTORY OF PRESENT ILLNESS
62 y.o. male with hx of HTN, HLD, Afib, DM type 2, Hepatitis C treated 10 years ago, resolved, pt c/o abdominal pain since 2 months ago, s/p MRI and CT scan, diagnosed with diverticulitis and left renal mass, tx with antibiotics, followed by colonoscopy, pt denies further abdomina pain, flank pain, hematuria or other symptoms, pt presents to PST for evaluation for Left Partial Nephrectomy, Possible Open, Possible Radical on 04/16/18 62 y.o. male with hx of HTN, MARIANA on CPAP, cardiomyopathy, Afib, DM type 2, Hepatitis C treated 10 years ago, resolved, pt c/o abdominal pain since 2 months ago, s/p MRI and CT scan, diagnosed with diverticulitis and left renal mass, tx with antibiotics, followed by colonoscopy and polypectomy, pt denies further abdomina pain, flank pain, hematuria or other symptoms, pt presents to PST for evaluation for Left Partial Nephrectomy, Possible Open, Possible Radical on 04/16/18

## 2018-04-06 NOTE — H&P PST ADULT - NEGATIVE ENMT SYMPTOMS
no ear pain/no sinus symptoms/no nasal congestion/no post-nasal discharge/no dry mouth/no hearing difficulty/no throat pain/no nasal discharge

## 2018-04-06 NOTE — H&P PST ADULT - RS GEN PE MLT RESP DETAILS PC
breath sounds equal/respirations non-labored/airway patent/clear to auscultation bilaterally/good air movement

## 2018-04-06 NOTE — H&P PST ADULT - PROBLEM SELECTOR PLAN 2
Pt instructed to hold metformin night before and morning of surgery.   Hold Onglyza and Glipizide morning of the surgery  OR booking notified Pt instructed to hold metformin night before and morning of surgery.   Hold Onglyza and Glipizide morning of the surgery  OR booking notified  s/p med eval on 03/31/18, follow up on 04/12/18, copy requested

## 2018-04-06 NOTE — H&P PST ADULT - CARDIOVASCULAR COMMENTS
hx of cardiomyopathy, hx of AFIB on coumadin and digoxin hx of afib hx of cardiomyopathy, EF 50-55%, hx of AFIB on coumadin and digoxin

## 2018-04-06 NOTE — H&P PST ADULT - FAMILY HISTORY
Father  Still living? Unknown  Diabetes mellitus, Age at diagnosis: Age Unknown     Sibling  Still living? Unknown  Diabetes mellitus, Age at diagnosis: Age Unknown     Mother  Still living? Unknown  Family history of colon cancer, Age at diagnosis: Age Unknown

## 2018-04-08 LAB
BACTERIA UR CULT: SIGNIFICANT CHANGE UP
SPECIMEN SOURCE: SIGNIFICANT CHANGE UP

## 2018-04-09 ENCOUNTER — RX RENEWAL (OUTPATIENT)
Age: 63
End: 2018-04-09

## 2018-04-12 ENCOUNTER — RX RENEWAL (OUTPATIENT)
Age: 63
End: 2018-04-12

## 2018-04-12 ENCOUNTER — APPOINTMENT (OUTPATIENT)
Dept: INTERNAL MEDICINE | Facility: CLINIC | Age: 63
End: 2018-04-12
Payer: COMMERCIAL

## 2018-04-12 VITALS
BODY MASS INDEX: 33.64 KG/M2 | RESPIRATION RATE: 14 BRPM | HEART RATE: 86 BPM | DIASTOLIC BLOOD PRESSURE: 82 MMHG | HEIGHT: 70 IN | SYSTOLIC BLOOD PRESSURE: 126 MMHG | WEIGHT: 235 LBS

## 2018-04-12 PROCEDURE — 85610 PROTHROMBIN TIME: CPT | Mod: QW

## 2018-04-12 PROCEDURE — 99214 OFFICE O/P EST MOD 30 MIN: CPT | Mod: 25

## 2018-04-15 ENCOUNTER — TRANSCRIPTION ENCOUNTER (OUTPATIENT)
Age: 63
End: 2018-04-15

## 2018-04-16 ENCOUNTER — APPOINTMENT (OUTPATIENT)
Dept: UROLOGY | Facility: HOSPITAL | Age: 63
End: 2018-04-16

## 2018-04-16 ENCOUNTER — RESULT REVIEW (OUTPATIENT)
Age: 63
End: 2018-04-16

## 2018-04-16 ENCOUNTER — INPATIENT (INPATIENT)
Facility: HOSPITAL | Age: 63
LOS: 3 days | Discharge: HOME | End: 2018-04-20
Attending: UROLOGY | Admitting: UROLOGY
Payer: COMMERCIAL

## 2018-04-16 VITALS
TEMPERATURE: 99 F | SYSTOLIC BLOOD PRESSURE: 148 MMHG | OXYGEN SATURATION: 98 % | HEART RATE: 100 BPM | RESPIRATION RATE: 20 BRPM | WEIGHT: 238.98 LBS | DIASTOLIC BLOOD PRESSURE: 86 MMHG | HEIGHT: 69 IN

## 2018-04-16 DIAGNOSIS — N28.89 OTHER SPECIFIED DISORDERS OF KIDNEY AND URETER: ICD-10-CM

## 2018-04-16 DIAGNOSIS — Z98.890 OTHER SPECIFIED POSTPROCEDURAL STATES: Chronic | ICD-10-CM

## 2018-04-16 LAB
APTT BLD: 29.5 SEC — SIGNIFICANT CHANGE UP (ref 27.5–37.4)
BASE EXCESS BLDA CALC-SCNC: -3 MMOL/L — SIGNIFICANT CHANGE UP
CA-I BLDA-SCNC: 1.17 MMOL/L — SIGNIFICANT CHANGE UP (ref 1.15–1.29)
GLUCOSE BLDA-MCNC: 155 MG/DL — HIGH (ref 70–99)
GLUCOSE BLDC GLUCOMTR-MCNC: 173 MG/DL — HIGH (ref 70–99)
GLUCOSE BLDC GLUCOMTR-MCNC: 261 MG/DL — HIGH (ref 70–99)
HCO3 BLDA-SCNC: 21 MMOL/L — LOW (ref 22–26)
HCT VFR BLDA CALC: 36.6 % — LOW (ref 39–51)
HGB BLDA-MCNC: 11.9 G/DL — LOW (ref 13–17)
INR BLD: 1.02 — SIGNIFICANT CHANGE UP (ref 0.88–1.17)
PCO2 BLDA: 44 MMHG — SIGNIFICANT CHANGE UP (ref 35–48)
PH BLDA: 7.33 PH — LOW (ref 7.35–7.45)
PO2 BLDA: 55 MMHG — LOW (ref 83–108)
POTASSIUM BLDA-SCNC: 4.2 MMOL/L — SIGNIFICANT CHANGE UP (ref 3.4–4.5)
PROTHROM AB SERPL-ACNC: 11.3 SEC — SIGNIFICANT CHANGE UP (ref 9.8–13.1)
RH IG SCN BLD-IMP: POSITIVE — SIGNIFICANT CHANGE UP
SAO2 % BLDA: 84.7 % — LOW (ref 95–99)
SODIUM BLDA-SCNC: 136 MMOL/L — SIGNIFICANT CHANGE UP (ref 136–146)

## 2018-04-16 PROCEDURE — 50543 LAPARO PARTIAL NEPHRECTOMY: CPT | Mod: LT

## 2018-04-16 PROCEDURE — 88342 IMHCHEM/IMCYTCHM 1ST ANTB: CPT | Mod: 26

## 2018-04-16 PROCEDURE — 76998 US GUIDE INTRAOP: CPT | Mod: 26

## 2018-04-16 PROCEDURE — 88305 TISSUE EXAM BY PATHOLOGIST: CPT | Mod: 26

## 2018-04-16 PROCEDURE — 88307 TISSUE EXAM BY PATHOLOGIST: CPT | Mod: 26

## 2018-04-16 PROCEDURE — 88341 IMHCHEM/IMCYTCHM EA ADD ANTB: CPT | Mod: 26

## 2018-04-16 PROCEDURE — 88331 PATH CONSLTJ SURG 1 BLK 1SPC: CPT | Mod: 26

## 2018-04-16 RX ORDER — ONDANSETRON 8 MG/1
4 TABLET, FILM COATED ORAL ONCE
Qty: 0 | Refills: 0 | Status: COMPLETED | OUTPATIENT
Start: 2018-04-16 | End: 2018-04-16

## 2018-04-16 RX ORDER — DEXTROSE 50 % IN WATER 50 %
25 SYRINGE (ML) INTRAVENOUS ONCE
Qty: 0 | Refills: 0 | Status: DISCONTINUED | OUTPATIENT
Start: 2018-04-16 | End: 2018-04-20

## 2018-04-16 RX ORDER — OXYCODONE AND ACETAMINOPHEN 5; 325 MG/1; MG/1
2 TABLET ORAL EVERY 6 HOURS
Qty: 0 | Refills: 0 | Status: DISCONTINUED | OUTPATIENT
Start: 2018-04-16 | End: 2018-04-20

## 2018-04-16 RX ORDER — ACETAMINOPHEN WITH CODEINE 300MG-30MG
1 TABLET ORAL EVERY 4 HOURS
Qty: 0 | Refills: 0 | Status: DISCONTINUED | OUTPATIENT
Start: 2018-04-16 | End: 2018-04-18

## 2018-04-16 RX ORDER — METOPROLOL TARTRATE 50 MG
50 TABLET ORAL
Qty: 0 | Refills: 0 | Status: DISCONTINUED | OUTPATIENT
Start: 2018-04-16 | End: 2018-04-17

## 2018-04-16 RX ORDER — HEPARIN SODIUM 5000 [USP'U]/ML
5000 INJECTION INTRAVENOUS; SUBCUTANEOUS EVERY 8 HOURS
Qty: 0 | Refills: 0 | Status: DISCONTINUED | OUTPATIENT
Start: 2018-04-16 | End: 2018-04-20

## 2018-04-16 RX ORDER — SODIUM CHLORIDE 9 MG/ML
1000 INJECTION, SOLUTION INTRAVENOUS
Qty: 0 | Refills: 0 | Status: DISCONTINUED | OUTPATIENT
Start: 2018-04-16 | End: 2018-04-16

## 2018-04-16 RX ORDER — GLUCAGON INJECTION, SOLUTION 0.5 MG/.1ML
1 INJECTION, SOLUTION SUBCUTANEOUS ONCE
Qty: 0 | Refills: 0 | Status: DISCONTINUED | OUTPATIENT
Start: 2018-04-16 | End: 2018-04-20

## 2018-04-16 RX ORDER — OXYCODONE AND ACETAMINOPHEN 5; 325 MG/1; MG/1
1 TABLET ORAL EVERY 4 HOURS
Qty: 0 | Refills: 0 | Status: DISCONTINUED | OUTPATIENT
Start: 2018-04-16 | End: 2018-04-20

## 2018-04-16 RX ORDER — DIGOXIN 250 MCG
0.12 TABLET ORAL DAILY
Qty: 0 | Refills: 0 | Status: DISCONTINUED | OUTPATIENT
Start: 2018-04-16 | End: 2018-04-17

## 2018-04-16 RX ORDER — DEXTROSE 50 % IN WATER 50 %
12.5 SYRINGE (ML) INTRAVENOUS ONCE
Qty: 0 | Refills: 0 | Status: DISCONTINUED | OUTPATIENT
Start: 2018-04-16 | End: 2018-04-20

## 2018-04-16 RX ORDER — INSULIN LISPRO 100/ML
VIAL (ML) SUBCUTANEOUS
Qty: 0 | Refills: 0 | Status: DISCONTINUED | OUTPATIENT
Start: 2018-04-16 | End: 2018-04-20

## 2018-04-16 RX ORDER — AMLODIPINE BESYLATE 2.5 MG/1
2.5 TABLET ORAL DAILY
Qty: 0 | Refills: 0 | Status: DISCONTINUED | OUTPATIENT
Start: 2018-04-16 | End: 2018-04-18

## 2018-04-16 RX ORDER — FENTANYL CITRATE 50 UG/ML
50 INJECTION INTRAVENOUS
Qty: 0 | Refills: 0 | Status: DISCONTINUED | OUTPATIENT
Start: 2018-04-16 | End: 2018-04-16

## 2018-04-16 RX ORDER — DEXTROSE 50 % IN WATER 50 %
1 SYRINGE (ML) INTRAVENOUS ONCE
Qty: 0 | Refills: 0 | Status: DISCONTINUED | OUTPATIENT
Start: 2018-04-16 | End: 2018-04-20

## 2018-04-16 RX ORDER — ONDANSETRON 8 MG/1
4 TABLET, FILM COATED ORAL EVERY 8 HOURS
Qty: 0 | Refills: 0 | Status: DISCONTINUED | OUTPATIENT
Start: 2018-04-16 | End: 2018-04-20

## 2018-04-16 RX ORDER — DILTIAZEM HCL 120 MG
240 CAPSULE, EXT RELEASE 24 HR ORAL DAILY
Qty: 0 | Refills: 0 | Status: DISCONTINUED | OUTPATIENT
Start: 2018-04-16 | End: 2018-04-17

## 2018-04-16 RX ORDER — HYDROMORPHONE HYDROCHLORIDE 2 MG/ML
1 INJECTION INTRAMUSCULAR; INTRAVENOUS; SUBCUTANEOUS
Qty: 0 | Refills: 0 | Status: DISCONTINUED | OUTPATIENT
Start: 2018-04-16 | End: 2018-04-16

## 2018-04-16 RX ORDER — ATORVASTATIN CALCIUM 80 MG/1
20 TABLET, FILM COATED ORAL AT BEDTIME
Qty: 0 | Refills: 0 | Status: DISCONTINUED | OUTPATIENT
Start: 2018-04-16 | End: 2018-04-20

## 2018-04-16 RX ORDER — SODIUM CHLORIDE 9 MG/ML
500 INJECTION, SOLUTION INTRAVENOUS ONCE
Qty: 0 | Refills: 0 | Status: COMPLETED | OUTPATIENT
Start: 2018-04-16 | End: 2018-04-16

## 2018-04-16 RX ORDER — SODIUM CHLORIDE 9 MG/ML
1000 INJECTION, SOLUTION INTRAVENOUS
Qty: 0 | Refills: 0 | Status: DISCONTINUED | OUTPATIENT
Start: 2018-04-16 | End: 2018-04-20

## 2018-04-16 RX ORDER — SODIUM CHLORIDE 9 MG/ML
1000 INJECTION, SOLUTION INTRAVENOUS
Qty: 0 | Refills: 0 | Status: DISCONTINUED | OUTPATIENT
Start: 2018-04-16 | End: 2018-04-17

## 2018-04-16 RX ADMIN — Medication 6: at 23:15

## 2018-04-16 RX ADMIN — SODIUM CHLORIDE 1000 MILLILITER(S): 9 INJECTION, SOLUTION INTRAVENOUS at 19:30

## 2018-04-16 RX ADMIN — HEPARIN SODIUM 5000 UNIT(S): 5000 INJECTION INTRAVENOUS; SUBCUTANEOUS at 22:24

## 2018-04-16 RX ADMIN — HYDROMORPHONE HYDROCHLORIDE 1 MILLIGRAM(S): 2 INJECTION INTRAMUSCULAR; INTRAVENOUS; SUBCUTANEOUS at 18:45

## 2018-04-16 RX ADMIN — ONDANSETRON 4 MILLIGRAM(S): 8 TABLET, FILM COATED ORAL at 23:59

## 2018-04-16 RX ADMIN — HYDROMORPHONE HYDROCHLORIDE 1 MILLIGRAM(S): 2 INJECTION INTRAMUSCULAR; INTRAVENOUS; SUBCUTANEOUS at 19:00

## 2018-04-16 RX ADMIN — ONDANSETRON 4 MILLIGRAM(S): 8 TABLET, FILM COATED ORAL at 19:35

## 2018-04-16 RX ADMIN — ONDANSETRON 4 MILLIGRAM(S): 8 TABLET, FILM COATED ORAL at 19:15

## 2018-04-16 NOTE — ASU PREOP CHECKLIST - 2.
red itchy skin to right wrist due to bee sting yesterday , right shin with broken skin, bleeding from abrasion at home. covered by pt with telfa dressing

## 2018-04-16 NOTE — BRIEF OPERATIVE NOTE - PROCEDURE
<<-----Click on this checkbox to enter Procedure Laparoscopic nephrectomy, partial  04/16/2018  left  Active  PSAMSON

## 2018-04-16 NOTE — ASU PATIENT PROFILE, ADULT - PMH
Atrial fibrillation    Cardiomyopathy    Diabetes mellitus  type 2  Hypertension    MARIANA on CPAP    Other specified disorders of kidney and ureter  left kidney mass

## 2018-04-17 DIAGNOSIS — I48.2 CHRONIC ATRIAL FIBRILLATION: ICD-10-CM

## 2018-04-17 DIAGNOSIS — I42.9 CARDIOMYOPATHY, UNSPECIFIED: ICD-10-CM

## 2018-04-17 DIAGNOSIS — N28.89 OTHER SPECIFIED DISORDERS OF KIDNEY AND URETER: ICD-10-CM

## 2018-04-17 DIAGNOSIS — Z09 ENCOUNTER FOR FOLLOW-UP EXAMINATION AFTER COMPLETED TREATMENT FOR CONDITIONS OTHER THAN MALIGNANT NEOPLASM: ICD-10-CM

## 2018-04-17 DIAGNOSIS — B19.20 UNSPECIFIED VIRAL HEPATITIS C WITHOUT HEPATIC COMA: ICD-10-CM

## 2018-04-17 DIAGNOSIS — Z29.9 ENCOUNTER FOR PROPHYLACTIC MEASURES, UNSPECIFIED: ICD-10-CM

## 2018-04-17 LAB
BUN SERPL-MCNC: 18 MG/DL — SIGNIFICANT CHANGE UP (ref 7–23)
CALCIUM SERPL-MCNC: 8.3 MG/DL — LOW (ref 8.4–10.5)
CHLORIDE SERPL-SCNC: 102 MMOL/L — SIGNIFICANT CHANGE UP (ref 98–107)
CO2 SERPL-SCNC: 22 MMOL/L — SIGNIFICANT CHANGE UP (ref 22–31)
CREAT SERPL-MCNC: 1.82 MG/DL — HIGH (ref 0.5–1.3)
GLUCOSE BLDC GLUCOMTR-MCNC: 214 MG/DL — HIGH (ref 70–99)
GLUCOSE BLDC GLUCOMTR-MCNC: 216 MG/DL — HIGH (ref 70–99)
GLUCOSE BLDC GLUCOMTR-MCNC: 235 MG/DL — HIGH (ref 70–99)
GLUCOSE BLDC GLUCOMTR-MCNC: 250 MG/DL — HIGH (ref 70–99)
GLUCOSE SERPL-MCNC: 238 MG/DL — HIGH (ref 70–99)
HCT VFR BLD CALC: 34.7 % — LOW (ref 39–50)
HGB BLD-MCNC: 11.6 G/DL — LOW (ref 13–17)
MCHC RBC-ENTMCNC: 26.9 PG — LOW (ref 27–34)
MCHC RBC-ENTMCNC: 33.4 % — SIGNIFICANT CHANGE UP (ref 32–36)
MCV RBC AUTO: 80.3 FL — SIGNIFICANT CHANGE UP (ref 80–100)
NRBC # FLD: 0 — SIGNIFICANT CHANGE UP
PLATELET # BLD AUTO: 206 K/UL — SIGNIFICANT CHANGE UP (ref 150–400)
PMV BLD: 9.5 FL — SIGNIFICANT CHANGE UP (ref 7–13)
POTASSIUM SERPL-MCNC: 4.7 MMOL/L — SIGNIFICANT CHANGE UP (ref 3.5–5.3)
POTASSIUM SERPL-SCNC: 4.7 MMOL/L — SIGNIFICANT CHANGE UP (ref 3.5–5.3)
RBC # BLD: 4.32 M/UL — SIGNIFICANT CHANGE UP (ref 4.2–5.8)
RBC # FLD: 14.2 % — SIGNIFICANT CHANGE UP (ref 10.3–14.5)
SODIUM SERPL-SCNC: 138 MMOL/L — SIGNIFICANT CHANGE UP (ref 135–145)
WBC # BLD: 11.34 K/UL — HIGH (ref 3.8–10.5)
WBC # FLD AUTO: 11.34 K/UL — HIGH (ref 3.8–10.5)

## 2018-04-17 PROCEDURE — 99223 1ST HOSP IP/OBS HIGH 75: CPT

## 2018-04-17 PROCEDURE — 93010 ELECTROCARDIOGRAM REPORT: CPT

## 2018-04-17 RX ORDER — DILTIAZEM HCL 120 MG
10 CAPSULE, EXT RELEASE 24 HR ORAL
Qty: 125 | Refills: 0 | Status: DISCONTINUED | OUTPATIENT
Start: 2018-04-17 | End: 2018-04-18

## 2018-04-17 RX ORDER — METOPROLOL TARTRATE 50 MG
5 TABLET ORAL ONCE
Qty: 0 | Refills: 0 | Status: COMPLETED | OUTPATIENT
Start: 2018-04-17 | End: 2018-04-17

## 2018-04-17 RX ORDER — SODIUM CHLORIDE 9 MG/ML
1000 INJECTION, SOLUTION INTRAVENOUS
Qty: 0 | Refills: 0 | Status: DISCONTINUED | OUTPATIENT
Start: 2018-04-17 | End: 2018-04-20

## 2018-04-17 RX ORDER — NADOLOL 80 MG/1
20 TABLET ORAL EVERY 12 HOURS
Qty: 0 | Refills: 0 | Status: DISCONTINUED | OUTPATIENT
Start: 2018-04-17 | End: 2018-04-20

## 2018-04-17 RX ORDER — METOPROLOL TARTRATE 50 MG
100 TABLET ORAL EVERY 12 HOURS
Qty: 0 | Refills: 0 | Status: DISCONTINUED | OUTPATIENT
Start: 2018-04-17 | End: 2018-04-17

## 2018-04-17 RX ORDER — DILTIAZEM HCL 120 MG
240 CAPSULE, EXT RELEASE 24 HR ORAL
Qty: 0 | Refills: 0 | Status: DISCONTINUED | OUTPATIENT
Start: 2018-04-17 | End: 2018-04-17

## 2018-04-17 RX ORDER — ACETAMINOPHEN 500 MG
650 TABLET ORAL EVERY 6 HOURS
Qty: 0 | Refills: 0 | Status: DISCONTINUED | OUTPATIENT
Start: 2018-04-17 | End: 2018-04-20

## 2018-04-17 RX ORDER — DILTIAZEM HCL 120 MG
240 CAPSULE, EXT RELEASE 24 HR ORAL AT BEDTIME
Qty: 0 | Refills: 0 | Status: DISCONTINUED | OUTPATIENT
Start: 2018-04-17 | End: 2018-04-17

## 2018-04-17 RX ORDER — HYDROMORPHONE HYDROCHLORIDE 2 MG/ML
1 INJECTION INTRAMUSCULAR; INTRAVENOUS; SUBCUTANEOUS EVERY 4 HOURS
Qty: 0 | Refills: 0 | Status: DISCONTINUED | OUTPATIENT
Start: 2018-04-17 | End: 2018-04-18

## 2018-04-17 RX ORDER — INSULIN GLARGINE 100 [IU]/ML
10 INJECTION, SOLUTION SUBCUTANEOUS AT BEDTIME
Qty: 0 | Refills: 0 | Status: DISCONTINUED | OUTPATIENT
Start: 2018-04-17 | End: 2018-04-19

## 2018-04-17 RX ADMIN — HYDROMORPHONE HYDROCHLORIDE 1 MILLIGRAM(S): 2 INJECTION INTRAMUSCULAR; INTRAVENOUS; SUBCUTANEOUS at 13:12

## 2018-04-17 RX ADMIN — HEPARIN SODIUM 5000 UNIT(S): 5000 INJECTION INTRAVENOUS; SUBCUTANEOUS at 13:15

## 2018-04-17 RX ADMIN — Medication 240 MILLIGRAM(S): at 04:08

## 2018-04-17 RX ADMIN — HYDROMORPHONE HYDROCHLORIDE 1 MILLIGRAM(S): 2 INJECTION INTRAMUSCULAR; INTRAVENOUS; SUBCUTANEOUS at 12:56

## 2018-04-17 RX ADMIN — SODIUM CHLORIDE 75 MILLILITER(S): 9 INJECTION, SOLUTION INTRAVENOUS at 20:00

## 2018-04-17 RX ADMIN — Medication 10 MG/HR: at 20:45

## 2018-04-17 RX ADMIN — HYDROMORPHONE HYDROCHLORIDE 1 MILLIGRAM(S): 2 INJECTION INTRAMUSCULAR; INTRAVENOUS; SUBCUTANEOUS at 04:13

## 2018-04-17 RX ADMIN — Medication 50 MILLIGRAM(S): at 04:04

## 2018-04-17 RX ADMIN — Medication 20 MILLIGRAM(S): at 05:10

## 2018-04-17 RX ADMIN — INSULIN GLARGINE 10 UNIT(S): 100 INJECTION, SOLUTION SUBCUTANEOUS at 23:42

## 2018-04-17 RX ADMIN — HEPARIN SODIUM 5000 UNIT(S): 5000 INJECTION INTRAVENOUS; SUBCUTANEOUS at 05:10

## 2018-04-17 RX ADMIN — HYDROMORPHONE HYDROCHLORIDE 1 MILLIGRAM(S): 2 INJECTION INTRAMUSCULAR; INTRAVENOUS; SUBCUTANEOUS at 04:45

## 2018-04-17 RX ADMIN — Medication 10 MILLIGRAM(S): at 16:33

## 2018-04-17 RX ADMIN — Medication 0.12 MILLIGRAM(S): at 05:10

## 2018-04-17 RX ADMIN — ONDANSETRON 4 MILLIGRAM(S): 8 TABLET, FILM COATED ORAL at 23:47

## 2018-04-17 RX ADMIN — ATORVASTATIN CALCIUM 20 MILLIGRAM(S): 80 TABLET, FILM COATED ORAL at 21:07

## 2018-04-17 RX ADMIN — Medication 4: at 13:15

## 2018-04-17 RX ADMIN — SODIUM CHLORIDE 75 MILLILITER(S): 9 INJECTION, SOLUTION INTRAVENOUS at 08:00

## 2018-04-17 RX ADMIN — OXYCODONE AND ACETAMINOPHEN 1 TABLET(S): 5; 325 TABLET ORAL at 02:24

## 2018-04-17 RX ADMIN — HEPARIN SODIUM 5000 UNIT(S): 5000 INJECTION INTRAVENOUS; SUBCUTANEOUS at 21:06

## 2018-04-17 RX ADMIN — Medication 100 MILLIGRAM(S): at 05:10

## 2018-04-17 RX ADMIN — AMLODIPINE BESYLATE 2.5 MILLIGRAM(S): 2.5 TABLET ORAL at 05:59

## 2018-04-17 RX ADMIN — Medication 650 MILLIGRAM(S): at 15:45

## 2018-04-17 RX ADMIN — Medication 4: at 09:34

## 2018-04-17 RX ADMIN — OXYCODONE AND ACETAMINOPHEN 1 TABLET(S): 5; 325 TABLET ORAL at 02:50

## 2018-04-17 RX ADMIN — Medication 5 MILLIGRAM(S): at 02:24

## 2018-04-17 RX ADMIN — Medication 4: at 18:20

## 2018-04-17 NOTE — CONSULT NOTE ADULT - PROBLEM SELECTOR RECOMMENDATION 9
S/P Left partial lap nephrectomy, POD#1.  Management as per .  Pain control  Bowel regimen  Incentive spirometer  DVT prophylaxis  OOB and ambulate SQ heparin prior to Xarelto

## 2018-04-17 NOTE — CONSULT NOTE ADULT - PROBLEM SELECTOR RECOMMENDATION 2
H/o A-fib X 20 years, on a/c. Was bridged by Lovenox prior to surgery.  [id number] Score 5.  Continue Cardizem, Toprol XL, Digoxin for rate control  Cardizem PRN for rate > 110  Xarelto 15mg PO daily when OK with  (Per Cardiology rec) H/o A-fib X 20 years, on a/c. Was bridged by Lovenox prior to surgery.  [id number] Score 5.  Continue Cardizem CD 240mg Q12H, Toprol XL 100mg Q12H, Digoxin 0.125mg daily for rate control  Cardizem 60mg PO PRN for rate > 110  Xarelto 15mg PO daily when OK with  (Per Cardiology rec)

## 2018-04-17 NOTE — CONSULT NOTE ADULT - PROBLEM SELECTOR RECOMMENDATION 4
BP well controlled.  Continue current BP meds  Monitor BP Likely due to Partial nephrectomy.  Monitor renal function daily  Avoid nephrotoxic agents.  Adjust meds based on renal function

## 2018-04-17 NOTE — CONSULT NOTE ADULT - SUBJECTIVE AND OBJECTIVE BOX
CHIEF COMPLAINT:Patient is a 62y old  Male who presents with a chief complaint of "I'm having left kidney mass removal" (06 Apr 2018 09:54)      HISTORY OF PRESENT ILLNESS:  This is a pleasant gentleman with history as below presented with left renal mass s/p   lap lef nephrectomy pod number 1  cardiology is called for afib with rvr  no sob or dizziness  no cp   pos pain in abd  distended abd     PAST MEDICAL & SURGICAL HISTORY:  Atrial fibrillation  Cardiomyopathy  MARIANA on CPAP  Other specified disorders of kidney and ureter: left kidney mass  Diabetes mellitus: type 2  Hypertension  H/O colonoscopy with polypectomy: 03/2018  History of tonsillectomy  TIA          MEDICATIONS:  amLODIPine   Tablet 2.5 milliGRAM(s) Oral daily  digoxin     Tablet 0.125 milliGRAM(s) Oral daily  diltiazem    milliGRAM(s) Oral <User Schedule>  enalapril 20 milliGRAM(s) Oral two times a day  heparin  Injectable 5000 Unit(s) SubCutaneous every 8 hours  metoprolol succinate  milliGRAM(s) Oral every 12 hours        acetaminophen   Tablet 650 milliGRAM(s) Oral every 6 hours PRN  acetaminophen 300 mG/codeine 30 mG 1 Tablet(s) Oral every 4 hours PRN  HYDROmorphone  Injectable 1 milliGRAM(s) IV Push every 4 hours PRN  ondansetron Injectable 4 milliGRAM(s) IV Push every 8 hours PRN  oxyCODONE    5 mG/acetaminophen 325 mG 1 Tablet(s) Oral every 4 hours PRN  oxyCODONE    5 mG/acetaminophen 325 mG 2 Tablet(s) Oral every 6 hours PRN      atorvastatin 20 milliGRAM(s) Oral at bedtime  dextrose 50% Injectable 12.5 Gram(s) IV Push once  dextrose 50% Injectable 25 Gram(s) IV Push once  dextrose 50% Injectable 25 Gram(s) IV Push once  dextrose Gel 1 Dose(s) Oral once PRN  glucagon  Injectable 1 milliGRAM(s) IntraMuscular once PRN  insulin glargine Injectable (LANTUS) 10 Unit(s) SubCutaneous at bedtime  insulin lispro (HumaLOG) corrective regimen sliding scale   SubCutaneous three times a day before meals    dextrose 5% + sodium chloride 0.45%. 1000 milliLiter(s) IV Continuous <Continuous>  dextrose 5%. 1000 milliLiter(s) IV Continuous <Continuous>      FAMILY HISTORY:  Family history of colon cancer (Mother)  Diabetes mellitus (Father, Sibling)      Non-contributory    SOCIAL HISTORY:    No tobacco, drugs or etoh    Allergies    No Known Allergies    Intolerances    	    REVIEW OF SYSTEMS:  as above  The rest of the 14 points ROS reviewed and except above they are unremarkable.        PHYSICAL EXAM:  T(C): 38.6 (04-17-18 @ 14:46), Max: 38.6 (04-17-18 @ 14:46)  HR: 156 (04-17-18 @ 14:46) (85 - 156)  BP: 125/70 (04-17-18 @ 14:46) (115/75 - 143/87)  RR: 18 (04-17-18 @ 14:46) (12 - 19)  SpO2: 97% (04-17-18 @ 14:46) (95% - 100%)  Wt(kg): --  I&O's Summary    16 Apr 2018 07:01  -  17 Apr 2018 07:00  --------------------------------------------------------  IN: 937 mL / OUT: 800 mL / NET: 137 mL    17 Apr 2018 07:01  -  17 Apr 2018 16:06  --------------------------------------------------------  IN: 0 mL / OUT: 200 mL / NET: -200 mL      JVP: Normal  Neck: supple  Lung: clear   CV: S1 S2 , Murmur:  Abd: distended   Ext: No edema  neuro: Awake / alert  Psych: flat affect  Skin: normal     LABS/DATA:    TELEMETRY: 	    ECG:  	   	  CARDIAC MARKERS:                                  11.6   11.34 )-----------( 206      ( 17 Apr 2018 05:20 )             34.7     04-17    138  |  102  |  18  ----------------------------<  238<H>  4.7   |  22  |  1.82<H>    Ca    8.3<L>      17 Apr 2018 05:20      proBNP:   Lipid Profile:   HgA1c:   TSH:

## 2018-04-17 NOTE — CONSULT NOTE ADULT - ASSESSMENT
Afib with RVR  likely due to post op state, abd distention, ileus, atelectasis  recommend  DC cardizem PO  start cardizem infusion at 10/hr  transfer to TELE  DC toprol   change to nadolol 20 bid PO  Dc digoxin  The calculated [id number] score is 5 , resume a/c once deemed safe.   obtain ECHO, if EF is low then would have to discontinue digoxin for long term treatment.     HTN  stable    TRAN  DC enalapril  DC digoxin

## 2018-04-17 NOTE — CONSULT NOTE ADULT - SUBJECTIVE AND OBJECTIVE BOX
Patient is a 62y old  Male who presents with a chief complaint of "I'm having left kidney mass removal".      HPI:  62 y.o. male with hx of HTN, MARIANA on CPAP, cardiomyopathy, Afib, DM type 2, Hepatitis C treated 10 years ago, resolved, pt c/o abdominal pain since 2 months ago, s/p MRI and CT scan, diagnosed with diverticulitis and left renal mass, tx with antibiotics, followed by colonoscopy and polypectomy, pt denies further abdomina pain, flank pain, hematuria or other symptoms, pt presents  for Left Partial Nephrectomy. S/P surgery, POD#1. Only c/o pain at surgery site, (+) tachycardia. Denies any CP or SOB.      History limited due to: [ ] Dementia  [ ] Delirium  [ ] Condition    Pain Symptoms if applicable:             	                         	           moderate          Pain:	                           	   	     4-6	          Location: Abd at surgery site  Modifying factors:	  Associated symptoms:	    Function: [x ] Independent  [ ] Assistance  [ ] Total care  [ ] Non-ambulatory    Allergies    No Known Allergies    Intolerances        HOME MEDICATIONS: [x ] Reviewed    MEDICATIONS  (STANDING):  amLODIPine   Tablet 2.5 milliGRAM(s) Oral daily  atorvastatin 20 milliGRAM(s) Oral at bedtime  dextrose 5% + sodium chloride 0.45%. 1000 milliLiter(s) (75 mL/Hr) IV Continuous <Continuous>  dextrose 5%. 1000 milliLiter(s) (50 mL/Hr) IV Continuous <Continuous>  dextrose 50% Injectable 12.5 Gram(s) IV Push once  dextrose 50% Injectable 25 Gram(s) IV Push once  dextrose 50% Injectable 25 Gram(s) IV Push once  digoxin     Tablet 0.125 milliGRAM(s) Oral daily  diltiazem    milliGRAM(s) Oral at bedtime  enalapril 20 milliGRAM(s) Oral two times a day  heparin  Injectable 5000 Unit(s) SubCutaneous every 8 hours  insulin lispro (HumaLOG) corrective regimen sliding scale   SubCutaneous three times a day before meals  metoprolol succinate ER 50 milliGRAM(s) Oral two times a day  metoprolol succinate  milliGRAM(s) Oral every 12 hours    MEDICATIONS  (PRN):  acetaminophen 300 mG/codeine 30 mG 1 Tablet(s) Oral every 4 hours PRN Moderate Pain  dextrose Gel 1 Dose(s) Oral once PRN Blood Glucose LESS THAN 70 milliGRAM(s)/deciliter  glucagon  Injectable 1 milliGRAM(s) IntraMuscular once PRN Glucose LESS THAN 70 milligrams/deciliter  HYDROmorphone  Injectable 1 milliGRAM(s) IV Push every 4 hours PRN breakthrough pain  ondansetron Injectable 4 milliGRAM(s) IV Push every 8 hours PRN Nausea and/or Vomiting  oxyCODONE    5 mG/acetaminophen 325 mG 1 Tablet(s) Oral every 4 hours PRN Moderate Pain  oxyCODONE    5 mG/acetaminophen 325 mG 2 Tablet(s) Oral every 6 hours PRN Severe Pain      PAST MEDICAL & SURGICAL HISTORY:  Atrial fibrillation  Cardiomyopathy  MARIANA on CPAP  Other specified disorders of kidney and ureter: left kidney mass  Diabetes mellitus: type 2  Hypertension  H/O colonoscopy with polypectomy: 03/2018  History of tonsillectomy  [x ] Reviewed     SOCIAL HISTORY:  Residence: [ ] [facility]  [ ] SNF  [ ] Community  [ ] Substance abuse: denies  [ ] Tobacco: denies  [ ] Alcohol use: denies    FAMILY HISTORY:  Family history of colon cancer (Mother)  Diabetes mellitus (Father, Sibling)  [ ] No pertinent family history in first degree relatives     REVIEW OF SYSTEMS:    CONSTITUTIONAL: No fever, weight loss, or fatigue  EYES: No eye pain, visual disturbances, or discharge  ENMT:  No difficulty hearing, tinnitus, vertigo; No sinus or throat pain  NECK: No pain or stiffness  BREASTS: No pain, masses, or nipple discharge  RESPIRATORY: No cough, wheezing, chills or hemoptysis; No shortness of breath  CARDIOVASCULAR: No chest pain, palpitations, dizziness, or leg swelling  GASTROINTESTINAL: (+) abdominal pain. No nausea, vomiting, or hematemesis; no BM or flatus  GENITOURINARY: (+) salvador  NEUROLOGICAL: No headaches, memory loss, loss of strength, numbness, or tremors  SKIN: No itching, burning, rashes, or lesions   LYMPH NODES: No enlarged glands  ENDOCRINE: No heat or cold intolerance; No hair loss  MUSCULOSKELETAL: No muscle or back pain  PSYCHIATRIC: No depression, anxiety, mood swings, or difficulty sleeping  HEME/LYMPH: No easy bruising, or bleeding gums  ALLERGY AND IMMUNOLOGIC: No hives or eczema    [  ] All other ROS negative  [  ] Unable to obtain due to poor mental status    Vital Signs Last 24 Hrs  T(C): 37.7 (17 Apr 2018 05:04), Max: 37.7 (17 Apr 2018 05:04)  T(F): 99.9 (17 Apr 2018 05:04), Max: 99.9 (17 Apr 2018 05:04)  HR: 125 (17 Apr 2018 05:04) (85 - 125)  BP: 125/79 (17 Apr 2018 05:04) (115/75 - 148/86)  BP(mean): --  RR: 18 (17 Apr 2018 05:04) (12 - 20)  SpO2: 99% (17 Apr 2018 05:04) (96% - 100%)    PHYSICAL EXAM:    GENERAL: NAD, well-groomed, well-developed  HEAD:  Atraumatic, Normocephalic  EYES: EOMI, PERRLA, conjunctiva and sclera clear  ENMT: Moist mucous membranes  NECK: Supple, No JVD  RESPIRATORY: Clear to auscultation bilaterally; No rales, rhonchi, wheezing, or rubs  CARDIOVASCULAR: Regular rate and rhythm; No murmurs, rubs, or gallops  GASTROINTESTINAL: (+) tender diffusely, distended; Bowel sounds hypoactive  GENITOURINARY: (+) Salvador  EXTREMITIES:  2+ Peripheral Pulses, No clubbing, cyanosis, or edema  NERVOUS SYSTEM:  Alert & Oriented X3; Moving all 4 extremities; No gross sensory deficits  HEME/LYMPH: No lymphadenopathy noted  SKIN: No rashes or lesions; Incisions C/D/I    LABS:                        11.6   11.34 )-----------( 206      ( 17 Apr 2018 05:20 )             34.7     04-17    138  |  102  |  18  ----------------------------<  238<H>  4.7   |  22  |  1.82<H>    Ca    8.3<L>      17 Apr 2018 05:20      PT/INR - ( 16 Apr 2018 12:20 )   PT: 11.3 SEC;   INR: 1.02          PTT - ( 16 Apr 2018 12:20 )  PTT:29.5 SEC    CAPILLARY BLOOD GLUCOSE      POCT Blood Glucose.: 235 mg/dL (17 Apr 2018 08:26)      RADIOLOGY & ADDITIONAL STUDIES:    EKG:   Personally Reviewed:  [X ] YES  A-fib 128BPM, no significant changes as compared with 3/19/18    Imaging:   Personally Reviewed:  [ ] YES               Consultant(s) notes reviewed:    Care Discussed with Consultant(s)/Other Providers:    Advanced Directives: [ ] DNR  [ ] No feeding tube  [ ] MOLST in chart  [ ] MOLST completed today  [x ] Unknown

## 2018-04-17 NOTE — PROGRESS NOTE ADULT - PROBLEM SELECTOR PLAN 2
- home dose cardizem, digoxin  - monitor HR  - f/u hospitalist recs - home dose cardizem, digoxin  - monitor HR  - hold anticoagulation for now  - f/u hospitalist recs

## 2018-04-17 NOTE — CONSULT NOTE ADULT - PROBLEM SELECTOR RECOMMENDATION 3
HbA1C 8.9.   today  Will give lantus 15units tonight  Continue FSSS with Humalog coverage.  Monitor FS

## 2018-04-17 NOTE — CHART NOTE - NSCHARTNOTEFT_GEN_A_CORE
Patient has history of atrial fibrillation. His HR went up to 120.  He denies any chest pain, palpitations, dyspnea. Pt is on cardizem  mg twice a day and Toprol  mg twice a day. He did not get any of his medications last night. His medications were reviewed by the cardiologist's note and confirmed bt pt's wife. Both cardizem and Toprol were given this morning, BP was stable, We will assess HR in an hour.

## 2018-04-17 NOTE — CHART NOTE - NSCHARTNOTEFT_GEN_A_CORE
Medical Attending:  Pt had HR  and fever 101.2.  Pt re-evaluated. Denies any CP or SOB. P ~100 irregular.   Lung cTA.  Will obtain Cardiology consult and transfer pt to Tele.  Spoke to PCP Dr Alvares on the phone about more information and plan of further a/c. Pt has h/o A-fib, rate has been well controlled. He prefer pt to be bridge with Lovenox  for therapeutic coumadin at time of discharge.  Will f/u Cardiology rec.  D/W IVAN

## 2018-04-17 NOTE — CONSULT NOTE ADULT - ASSESSMENT
62 y.o. M with h/o chronic a-fib on A/C, DM2, HTN, Cardiomyopathy, ? TIA's, admitted for lap partial left nephrectomy. S/P OR, POD #1.

## 2018-04-18 DIAGNOSIS — I48.1 PERSISTENT ATRIAL FIBRILLATION: ICD-10-CM

## 2018-04-18 LAB
BUN SERPL-MCNC: 17 MG/DL — SIGNIFICANT CHANGE UP (ref 7–23)
CALCIUM SERPL-MCNC: 8.1 MG/DL — LOW (ref 8.4–10.5)
CHLORIDE SERPL-SCNC: 99 MMOL/L — SIGNIFICANT CHANGE UP (ref 98–107)
CO2 SERPL-SCNC: 22 MMOL/L — SIGNIFICANT CHANGE UP (ref 22–31)
CREAT SERPL-MCNC: 2.04 MG/DL — HIGH (ref 0.5–1.3)
GLUCOSE BLDC GLUCOMTR-MCNC: 229 MG/DL — HIGH (ref 70–99)
GLUCOSE BLDC GLUCOMTR-MCNC: 229 MG/DL — HIGH (ref 70–99)
GLUCOSE BLDC GLUCOMTR-MCNC: 233 MG/DL — HIGH (ref 70–99)
GLUCOSE BLDC GLUCOMTR-MCNC: 234 MG/DL — HIGH (ref 70–99)
GLUCOSE SERPL-MCNC: 297 MG/DL — HIGH (ref 70–99)
HCT VFR BLD CALC: 31 % — LOW (ref 39–50)
HGB BLD-MCNC: 10.2 G/DL — LOW (ref 13–17)
MCHC RBC-ENTMCNC: 27.2 PG — SIGNIFICANT CHANGE UP (ref 27–34)
MCHC RBC-ENTMCNC: 32.9 % — SIGNIFICANT CHANGE UP (ref 32–36)
MCV RBC AUTO: 82.7 FL — SIGNIFICANT CHANGE UP (ref 80–100)
NRBC # FLD: 0 — SIGNIFICANT CHANGE UP
PLATELET # BLD AUTO: 176 K/UL — SIGNIFICANT CHANGE UP (ref 150–400)
PMV BLD: 10.2 FL — SIGNIFICANT CHANGE UP (ref 7–13)
POTASSIUM SERPL-MCNC: 4.1 MMOL/L — SIGNIFICANT CHANGE UP (ref 3.5–5.3)
POTASSIUM SERPL-SCNC: 4.1 MMOL/L — SIGNIFICANT CHANGE UP (ref 3.5–5.3)
RBC # BLD: 3.75 M/UL — LOW (ref 4.2–5.8)
RBC # FLD: 14.6 % — HIGH (ref 10.3–14.5)
SODIUM SERPL-SCNC: 133 MMOL/L — LOW (ref 135–145)
WBC # BLD: 13.25 K/UL — HIGH (ref 3.8–10.5)
WBC # FLD AUTO: 13.25 K/UL — HIGH (ref 3.8–10.5)

## 2018-04-18 PROCEDURE — 99233 SBSQ HOSP IP/OBS HIGH 50: CPT

## 2018-04-18 RX ORDER — ACETAMINOPHEN 500 MG
650 TABLET ORAL ONCE
Qty: 0 | Refills: 0 | Status: COMPLETED | OUTPATIENT
Start: 2018-04-18 | End: 2018-04-18

## 2018-04-18 RX ORDER — MORPHINE SULFATE 50 MG/1
4 CAPSULE, EXTENDED RELEASE ORAL ONCE
Qty: 0 | Refills: 0 | Status: DISCONTINUED | OUTPATIENT
Start: 2018-04-18 | End: 2018-04-18

## 2018-04-18 RX ORDER — ACETAMINOPHEN 500 MG
1000 TABLET ORAL ONCE
Qty: 0 | Refills: 0 | Status: COMPLETED | OUTPATIENT
Start: 2018-04-18 | End: 2018-04-18

## 2018-04-18 RX ORDER — HYDROMORPHONE HYDROCHLORIDE 2 MG/ML
0.5 INJECTION INTRAMUSCULAR; INTRAVENOUS; SUBCUTANEOUS ONCE
Qty: 0 | Refills: 0 | Status: DISCONTINUED | OUTPATIENT
Start: 2018-04-18 | End: 2018-04-18

## 2018-04-18 RX ORDER — DILTIAZEM HCL 120 MG
10 CAPSULE, EXT RELEASE 24 HR ORAL
Qty: 125 | Refills: 0 | Status: DISCONTINUED | OUTPATIENT
Start: 2018-04-18 | End: 2018-04-19

## 2018-04-18 RX ORDER — DILTIAZEM HCL 120 MG
15 CAPSULE, EXT RELEASE 24 HR ORAL
Qty: 125 | Refills: 0 | Status: DISCONTINUED | OUTPATIENT
Start: 2018-04-18 | End: 2018-04-18

## 2018-04-18 RX ADMIN — Medication 650 MILLIGRAM(S): at 00:30

## 2018-04-18 RX ADMIN — MORPHINE SULFATE 4 MILLIGRAM(S): 50 CAPSULE, EXTENDED RELEASE ORAL at 08:52

## 2018-04-18 RX ADMIN — NADOLOL 20 MILLIGRAM(S): 80 TABLET ORAL at 06:50

## 2018-04-18 RX ADMIN — AMLODIPINE BESYLATE 2.5 MILLIGRAM(S): 2.5 TABLET ORAL at 06:50

## 2018-04-18 RX ADMIN — ATORVASTATIN CALCIUM 20 MILLIGRAM(S): 80 TABLET, FILM COATED ORAL at 21:49

## 2018-04-18 RX ADMIN — OXYCODONE AND ACETAMINOPHEN 2 TABLET(S): 5; 325 TABLET ORAL at 19:18

## 2018-04-18 RX ADMIN — OXYCODONE AND ACETAMINOPHEN 2 TABLET(S): 5; 325 TABLET ORAL at 18:48

## 2018-04-18 RX ADMIN — HEPARIN SODIUM 5000 UNIT(S): 5000 INJECTION INTRAVENOUS; SUBCUTANEOUS at 15:00

## 2018-04-18 RX ADMIN — Medication 4: at 12:43

## 2018-04-18 RX ADMIN — MORPHINE SULFATE 4 MILLIGRAM(S): 50 CAPSULE, EXTENDED RELEASE ORAL at 08:06

## 2018-04-18 RX ADMIN — Medication 4: at 08:53

## 2018-04-18 RX ADMIN — Medication 1 ENEMA: at 08:07

## 2018-04-18 RX ADMIN — NADOLOL 20 MILLIGRAM(S): 80 TABLET ORAL at 18:47

## 2018-04-18 RX ADMIN — Medication 10 MG/HR: at 11:02

## 2018-04-18 RX ADMIN — INSULIN GLARGINE 10 UNIT(S): 100 INJECTION, SOLUTION SUBCUTANEOUS at 21:49

## 2018-04-18 RX ADMIN — HYDROMORPHONE HYDROCHLORIDE 1 MILLIGRAM(S): 2 INJECTION INTRAMUSCULAR; INTRAVENOUS; SUBCUTANEOUS at 05:25

## 2018-04-18 RX ADMIN — Medication 650 MILLIGRAM(S): at 01:20

## 2018-04-18 RX ADMIN — HEPARIN SODIUM 5000 UNIT(S): 5000 INJECTION INTRAVENOUS; SUBCUTANEOUS at 06:50

## 2018-04-18 RX ADMIN — HEPARIN SODIUM 5000 UNIT(S): 5000 INJECTION INTRAVENOUS; SUBCUTANEOUS at 21:49

## 2018-04-18 RX ADMIN — Medication 4: at 18:48

## 2018-04-18 RX ADMIN — HYDROMORPHONE HYDROCHLORIDE 1 MILLIGRAM(S): 2 INJECTION INTRAMUSCULAR; INTRAVENOUS; SUBCUTANEOUS at 04:21

## 2018-04-18 NOTE — PROGRESS NOTE ADULT - PROBLEM SELECTOR PLAN 2
On Cardizem drip and Nadolol, rate better controlled  f/U Cardiology rec.  Re-start A/c with Xarelto 15mg daily when OK with

## 2018-04-18 NOTE — PROGRESS NOTE ADULT - ASSESSMENT
62 y.o. M with h/o chronic a-fib on A/C, DM2, HTN, Cardiomyopathy, ? TIA's, admitted for lap partial left nephrectomy. S/P OR, POD #2.

## 2018-04-18 NOTE — PROGRESS NOTE ADULT - ASSESSMENT
Afib with RVR  likely due to post op state, abd distention, ileus, atelectasis, fever   HR improving today   recommend  cont cardizem infusion at 15/hr  cont nadolol  off dig  The calculated [id number] score is 5 , resume a/c once deemed safe.   obtain echo, if EF is low, then will have to dc CCB for long term treatment       HTN  stable    TRAN  off enalapril  off digoxin Afib with RVR  likely due to post op state, abd distention, ileus, atelectasis, fever   HR improving today   recommend  cont cardizem infusion , if HR later today is above 100 then will increase rate   cont nadolol  off dig  The calculated [id number] score is 5 , resume a/c once deemed safe.   obtain echo, if EF is low, then will have to dc CCB for long term treatment       HTN  stable    TRAN  off enalapril  off digoxin

## 2018-04-19 LAB
BUN SERPL-MCNC: 21 MG/DL — SIGNIFICANT CHANGE UP (ref 7–23)
CALCIUM SERPL-MCNC: 8.1 MG/DL — LOW (ref 8.4–10.5)
CHLORIDE SERPL-SCNC: 100 MMOL/L — SIGNIFICANT CHANGE UP (ref 98–107)
CO2 SERPL-SCNC: 22 MMOL/L — SIGNIFICANT CHANGE UP (ref 22–31)
CREAT SERPL-MCNC: 2.16 MG/DL — HIGH (ref 0.5–1.3)
GLUCOSE BLDC GLUCOMTR-MCNC: 211 MG/DL — HIGH (ref 70–99)
GLUCOSE BLDC GLUCOMTR-MCNC: 212 MG/DL — HIGH (ref 70–99)
GLUCOSE BLDC GLUCOMTR-MCNC: 218 MG/DL — HIGH (ref 70–99)
GLUCOSE BLDC GLUCOMTR-MCNC: 219 MG/DL — HIGH (ref 70–99)
GLUCOSE SERPL-MCNC: 229 MG/DL — HIGH (ref 70–99)
HCT VFR BLD CALC: 27.6 % — LOW (ref 39–50)
HGB BLD-MCNC: 9 G/DL — LOW (ref 13–17)
MCHC RBC-ENTMCNC: 27.2 PG — SIGNIFICANT CHANGE UP (ref 27–34)
MCHC RBC-ENTMCNC: 32.6 % — SIGNIFICANT CHANGE UP (ref 32–36)
MCV RBC AUTO: 83.4 FL — SIGNIFICANT CHANGE UP (ref 80–100)
NRBC # FLD: 0 — SIGNIFICANT CHANGE UP
PLATELET # BLD AUTO: 178 K/UL — SIGNIFICANT CHANGE UP (ref 150–400)
PMV BLD: 10.2 FL — SIGNIFICANT CHANGE UP (ref 7–13)
POTASSIUM SERPL-MCNC: 3.8 MMOL/L — SIGNIFICANT CHANGE UP (ref 3.5–5.3)
POTASSIUM SERPL-SCNC: 3.8 MMOL/L — SIGNIFICANT CHANGE UP (ref 3.5–5.3)
RBC # BLD: 3.31 M/UL — LOW (ref 4.2–5.8)
RBC # FLD: 14.3 % — SIGNIFICANT CHANGE UP (ref 10.3–14.5)
SODIUM SERPL-SCNC: 136 MMOL/L — SIGNIFICANT CHANGE UP (ref 135–145)
WBC # BLD: 13.82 K/UL — HIGH (ref 3.8–10.5)
WBC # FLD AUTO: 13.82 K/UL — HIGH (ref 3.8–10.5)

## 2018-04-19 PROCEDURE — 74176 CT ABD & PELVIS W/O CONTRAST: CPT | Mod: 26

## 2018-04-19 PROCEDURE — 99233 SBSQ HOSP IP/OBS HIGH 50: CPT

## 2018-04-19 RX ORDER — INSULIN GLARGINE 100 [IU]/ML
15 INJECTION, SOLUTION SUBCUTANEOUS AT BEDTIME
Qty: 0 | Refills: 0 | Status: DISCONTINUED | OUTPATIENT
Start: 2018-04-19 | End: 2018-04-20

## 2018-04-19 RX ADMIN — NADOLOL 20 MILLIGRAM(S): 80 TABLET ORAL at 17:31

## 2018-04-19 RX ADMIN — OXYCODONE AND ACETAMINOPHEN 1 TABLET(S): 5; 325 TABLET ORAL at 13:15

## 2018-04-19 RX ADMIN — HEPARIN SODIUM 5000 UNIT(S): 5000 INJECTION INTRAVENOUS; SUBCUTANEOUS at 05:22

## 2018-04-19 RX ADMIN — Medication 4: at 07:58

## 2018-04-19 RX ADMIN — OXYCODONE AND ACETAMINOPHEN 1 TABLET(S): 5; 325 TABLET ORAL at 20:24

## 2018-04-19 RX ADMIN — HEPARIN SODIUM 5000 UNIT(S): 5000 INJECTION INTRAVENOUS; SUBCUTANEOUS at 21:48

## 2018-04-19 RX ADMIN — OXYCODONE AND ACETAMINOPHEN 2 TABLET(S): 5; 325 TABLET ORAL at 01:13

## 2018-04-19 RX ADMIN — OXYCODONE AND ACETAMINOPHEN 1 TABLET(S): 5; 325 TABLET ORAL at 21:20

## 2018-04-19 RX ADMIN — Medication 4: at 17:31

## 2018-04-19 RX ADMIN — ONDANSETRON 4 MILLIGRAM(S): 8 TABLET, FILM COATED ORAL at 16:40

## 2018-04-19 RX ADMIN — OXYCODONE AND ACETAMINOPHEN 2 TABLET(S): 5; 325 TABLET ORAL at 02:00

## 2018-04-19 RX ADMIN — Medication 4: at 12:27

## 2018-04-19 RX ADMIN — HEPARIN SODIUM 5000 UNIT(S): 5000 INJECTION INTRAVENOUS; SUBCUTANEOUS at 13:55

## 2018-04-19 RX ADMIN — NADOLOL 20 MILLIGRAM(S): 80 TABLET ORAL at 05:23

## 2018-04-19 RX ADMIN — INSULIN GLARGINE 15 UNIT(S): 100 INJECTION, SOLUTION SUBCUTANEOUS at 21:48

## 2018-04-19 RX ADMIN — OXYCODONE AND ACETAMINOPHEN 1 TABLET(S): 5; 325 TABLET ORAL at 12:32

## 2018-04-19 RX ADMIN — ATORVASTATIN CALCIUM 20 MILLIGRAM(S): 80 TABLET, FILM COATED ORAL at 21:48

## 2018-04-19 NOTE — PROGRESS NOTE ADULT - PROBLEM SELECTOR PLAN 2
Rate better controlled, off Cardizem drip,  on Cardizem and Nadolol  f/U Cardiology rec.  Re-start A/c with Xarelto 15mg daily when OK with

## 2018-04-19 NOTE — PROGRESS NOTE ADULT - ASSESSMENT
Afib with RVR  likely due to post op state, abd distention, ileus, atelectasis, fever   HR improving today   recommend  change cardizem to PO  cont nadolol  off dig  The calculated [id number] score is 5 , resume a/c once deemed safe.   obtain echo, if EF is low, then will have to dc CCB for long term treatment       HTN  stable    TRAN  off enalapril  off digoxin

## 2018-04-19 NOTE — PROGRESS NOTE ADULT - ASSESSMENT
62 y.o. M with h/o chronic a-fib on A/C, DM2, HTN, Cardiomyopathy, ? TIA's, admitted for lap partial left nephrectomy. S/P OR, POD #3.

## 2018-04-20 ENCOUNTER — TRANSCRIPTION ENCOUNTER (OUTPATIENT)
Age: 63
End: 2018-04-20

## 2018-04-20 VITALS
RESPIRATION RATE: 20 BRPM | TEMPERATURE: 99 F | SYSTOLIC BLOOD PRESSURE: 143 MMHG | DIASTOLIC BLOOD PRESSURE: 76 MMHG | HEART RATE: 98 BPM | OXYGEN SATURATION: 96 %

## 2018-04-20 LAB
APTT BLD: 24.5 SEC — LOW (ref 27.5–37.4)
BUN SERPL-MCNC: 19 MG/DL — SIGNIFICANT CHANGE UP (ref 7–23)
CALCIUM SERPL-MCNC: 8.4 MG/DL — SIGNIFICANT CHANGE UP (ref 8.4–10.5)
CHLORIDE SERPL-SCNC: 100 MMOL/L — SIGNIFICANT CHANGE UP (ref 98–107)
CO2 SERPL-SCNC: 21 MMOL/L — LOW (ref 22–31)
CREAT SERPL-MCNC: 1.89 MG/DL — HIGH (ref 0.5–1.3)
GLUCOSE BLDC GLUCOMTR-MCNC: 206 MG/DL — HIGH (ref 70–99)
GLUCOSE BLDC GLUCOMTR-MCNC: 217 MG/DL — HIGH (ref 70–99)
GLUCOSE BLDC GLUCOMTR-MCNC: 224 MG/DL — HIGH (ref 70–99)
GLUCOSE SERPL-MCNC: 174 MG/DL — HIGH (ref 70–99)
HCT VFR BLD CALC: 29.5 % — LOW (ref 39–50)
HGB BLD-MCNC: 9.5 G/DL — LOW (ref 13–17)
INR BLD: 1.05 — SIGNIFICANT CHANGE UP (ref 0.88–1.17)
MCHC RBC-ENTMCNC: 27 PG — SIGNIFICANT CHANGE UP (ref 27–34)
MCHC RBC-ENTMCNC: 32.2 % — SIGNIFICANT CHANGE UP (ref 32–36)
MCV RBC AUTO: 83.8 FL — SIGNIFICANT CHANGE UP (ref 80–100)
NRBC # FLD: 0 — SIGNIFICANT CHANGE UP
PLATELET # BLD AUTO: 205 K/UL — SIGNIFICANT CHANGE UP (ref 150–400)
PMV BLD: 10.4 FL — SIGNIFICANT CHANGE UP (ref 7–13)
POTASSIUM SERPL-MCNC: 3.4 MMOL/L — LOW (ref 3.5–5.3)
POTASSIUM SERPL-SCNC: 3.4 MMOL/L — LOW (ref 3.5–5.3)
PROTHROM AB SERPL-ACNC: 12.1 SEC — SIGNIFICANT CHANGE UP (ref 9.8–13.1)
RBC # BLD: 3.52 M/UL — LOW (ref 4.2–5.8)
RBC # FLD: 14 % — SIGNIFICANT CHANGE UP (ref 10.3–14.5)
SODIUM SERPL-SCNC: 135 MMOL/L — SIGNIFICANT CHANGE UP (ref 135–145)
WBC # BLD: 11.44 K/UL — HIGH (ref 3.8–10.5)
WBC # FLD AUTO: 11.44 K/UL — HIGH (ref 3.8–10.5)

## 2018-04-20 PROCEDURE — 99233 SBSQ HOSP IP/OBS HIGH 50: CPT

## 2018-04-20 PROCEDURE — 93306 TTE W/DOPPLER COMPLETE: CPT | Mod: 26

## 2018-04-20 RX ORDER — DIGOXIN 250 MCG
1 TABLET ORAL
Qty: 0 | Refills: 0 | COMMUNITY

## 2018-04-20 RX ORDER — DILTIAZEM HCL 120 MG
1 CAPSULE, EXT RELEASE 24 HR ORAL
Qty: 0 | Refills: 0 | COMMUNITY

## 2018-04-20 RX ORDER — NADOLOL 80 MG/1
1 TABLET ORAL
Qty: 60 | Refills: 0 | OUTPATIENT
Start: 2018-04-20 | End: 2018-05-19

## 2018-04-20 RX ORDER — METOPROLOL TARTRATE 50 MG
1 TABLET ORAL
Qty: 0 | Refills: 0 | COMMUNITY

## 2018-04-20 RX ORDER — POTASSIUM CHLORIDE 20 MEQ
20 PACKET (EA) ORAL ONCE
Qty: 0 | Refills: 0 | Status: COMPLETED | OUTPATIENT
Start: 2018-04-20 | End: 2018-04-20

## 2018-04-20 RX ORDER — AMLODIPINE BESYLATE 2.5 MG/1
1 TABLET ORAL
Qty: 0 | Refills: 0 | COMMUNITY

## 2018-04-20 RX ORDER — METFORMIN HYDROCHLORIDE 850 MG/1
1 TABLET ORAL
Qty: 0 | Refills: 0 | COMMUNITY

## 2018-04-20 RX ORDER — DILTIAZEM HCL 120 MG
1 CAPSULE, EXT RELEASE 24 HR ORAL
Qty: 30 | Refills: 0 | OUTPATIENT
Start: 2018-04-20 | End: 2018-05-19

## 2018-04-20 RX ORDER — WARFARIN SODIUM 2.5 MG/1
1 TABLET ORAL
Qty: 0 | Refills: 0 | COMMUNITY

## 2018-04-20 RX ADMIN — Medication 4: at 08:24

## 2018-04-20 RX ADMIN — NADOLOL 20 MILLIGRAM(S): 80 TABLET ORAL at 05:16

## 2018-04-20 RX ADMIN — Medication 20 MILLIEQUIVALENT(S): at 15:07

## 2018-04-20 RX ADMIN — HEPARIN SODIUM 5000 UNIT(S): 5000 INJECTION INTRAVENOUS; SUBCUTANEOUS at 05:16

## 2018-04-20 RX ADMIN — HEPARIN SODIUM 5000 UNIT(S): 5000 INJECTION INTRAVENOUS; SUBCUTANEOUS at 13:16

## 2018-04-20 RX ADMIN — Medication 4: at 17:38

## 2018-04-20 RX ADMIN — Medication 4: at 13:15

## 2018-04-20 RX ADMIN — NADOLOL 20 MILLIGRAM(S): 80 TABLET ORAL at 17:38

## 2018-04-20 RX ADMIN — OXYCODONE AND ACETAMINOPHEN 2 TABLET(S): 5; 325 TABLET ORAL at 01:10

## 2018-04-20 RX ADMIN — OXYCODONE AND ACETAMINOPHEN 2 TABLET(S): 5; 325 TABLET ORAL at 00:24

## 2018-04-20 NOTE — DISCHARGE NOTE ADULT - MEDICATION SUMMARY - MEDICATIONS TO CHANGE
I will SWITCH the dose or number of times a day I take the medications listed below when I get home from the hospital:    Coumadin 5 mg oral tablet  -- 1 tab(s) by mouth once a day in pm  last dsoe 4/9

## 2018-04-20 NOTE — PROGRESS NOTE ADULT - PROBLEM SELECTOR PLAN 2
Rate better controlled, off Cardizem drip,  on Cardizem and Nadolol  f/U Cardiology rec.  Re-start A/c with Xarelto 15mg daily when OK with   When going home, can restart Lovenox to bridge to therapeutic Coumadin

## 2018-04-20 NOTE — PROGRESS NOTE ADULT - PROBLEM SELECTOR PLAN 2
- hold a/c until Monday  - will need to bridge with Lovenox at home before starting coumadin  - appreciate cardiology note

## 2018-04-20 NOTE — DISCHARGE NOTE ADULT - CONDITIONS AT DISCHARGE
Stable. Discharge instructions given to pt and wife with teach back method. They both related understanding of the instructions.

## 2018-04-20 NOTE — PROGRESS NOTE ADULT - ATTENDING COMMENTS
Spoke to PCP Dr Armas on the phone about a/c and diabetic meds at discharge.  Will re-start Lovenox and coumadin at discharge when OK with , Dr Armas will closely monitor for signs of bleeding and f/u INR. Will hold off Metformin and re-start Glipizide and Onglyza at discharge as per discussion.
PT seen and examined agree with assessment and plan
PT seen and examined agree with assessment and plan   creatinine elevated. will check CT
PT seen and examined agree with assessment and plan

## 2018-04-20 NOTE — DISCHARGE NOTE ADULT - MEDICATION SUMMARY - MEDICATIONS TO TAKE
I will START or STAY ON the medications listed below when I get home from the hospital:    magnesium oxide 50 mg 2x/week  -- Indication: For Home med    zinc 50 mg orally daily  -- Indication: For Home med    oxyCODONE-acetaminophen 5 mg-325 mg oral tablet  -- 1 to 2  tab(s) by mouth every 4 to 6 hours, As needed, For Pain MDD:6  -- Indication: For Pain    Cardizem  mg/24 hours oral capsule, extended release  -- 1 cap(s) by mouth once a day   -- It is very important that you take or use this exactly as directed.  Do not skip doses or discontinue unless directed by your doctor.  Some non-prescription drugs may aggravate your condition.  Read all labels carefully.  If a warning appears, check with your doctor before taking.  Swallow whole.  Do not crush.    -- Indication: For Afib    Coumadin 5 mg oral tablet  -- 1 tab(s) by mouth once a day in pm restart on Monday, 4/23  -- Indication: For Afib    glipiZIDE 10 mg oral tablet, extended release  -- 1 tab(s) by mouth 2 times a day  -- Indication: For Home med    Onglyza 5 mg oral tablet  -- 1 tab(s) by mouth once a day  -- Indication: For Home med    Crestor 5 mg oral tablet  -- 1 tab(s) by mouth once a day (at bedtime)  -- Indication: For Home med     nadolol 20 mg oral tablet  -- 1 tab(s) by mouth every 12 hours  -- Indication: For Afib    Colace 100 mg oral capsule  -- 1 cap(s) by mouth 3 times a day  -- Indication: For Constipation    Senna 8.6 mg oral tablet  -- 2 tab(s) by mouth once a day (at bedtime)  -- Indication: For Constipation    Centrum oral tablet  -- 1 tab(s) by mouth once a day  last dose 4/9  -- Indication: For Home med

## 2018-04-20 NOTE — PROGRESS NOTE ADULT - ASSESSMENT
Afib with RVR  HR much better now  cont cardizem PO , we can switch to cardizem 360 CD UPON DISCHARGE   cont nadolol  off dig  The calculated [id number] score is 5 , resume a/c once deemed safe.   obtain echo, if EF is low, then will have to dc CCB for long term treatment       HTN  stable    TRAN  off enalapril  off digoxin

## 2018-04-20 NOTE — PROGRESS NOTE ADULT - PROBLEM SELECTOR PROBLEM 1
Other specified disorders of kidney and ureter
Other specified disorders of kidney and ureter
Postop check
Renal mass, left
Other specified disorders of kidney and ureter
Renal mass, left

## 2018-04-20 NOTE — PROGRESS NOTE ADULT - PROBLEM SELECTOR PLAN 4
Likely due to Surgery.  Continue to monitor renal function

## 2018-04-20 NOTE — PROGRESS NOTE ADULT - PROBLEM SELECTOR PLAN 1
- clear liquids later today  - ambulate  - pain control
- reg diet  - discharge home today
-cbc/bmp  - check GI function  - enema  - ambulate  - venodynes
POD#2.  management as per .  Pain control  Bowel regimen  Incentive spirometer  DVT prophylaxis  OOB and ambulate
POD#3.  management as per .  Pain control  Bowel regimen  Incentive spirometer  DVT prophylaxis  OOB and ambulate
Pain management  antiemtics  labs in AM  clear liquid diet  IVF
- cbc/bmp  - check GI function  - clears  - dulcolax  - out of bed, ambulate  - maintenance IV fluids  - void trial
POD#4.  management as per .  Pain control  Bowel regimen  Incentive spirometer  DVT prophylaxis  OOB and ambulate

## 2018-04-20 NOTE — PROGRESS NOTE ADULT - PROBLEM SELECTOR PLAN 3
's  Will increase Lantus to 15units daily  Continue FSSS  Monitor FS
's  Will increase Lantus to 15units daily  Continue FSSS  Monitor FS
's  Will increase Lantus to 15units daily  Continue FSSS  Monitor FS  At discharge may re-start Glipizide and Onglyza and hold Metformin

## 2018-04-20 NOTE — DISCHARGE NOTE ADULT - CARE PLAN
Principal Discharge DX:	Renal mass, left  Goal:	Pain control  Assessment and plan of treatment:	Drink plenty of fluids.  No heavy lifting (greater than 10 pounds) or straining for 4 to 6 weeks.  You may shower, just pat white strips dry, they will fall off in a few weeks. Do not drive when taking pain medication.  Call Dr. Michelle's office  to schedule a follow up appointment.  Call the office if you have fever greater than 101, difficulty urinating, pain not relieved with pain medication, nausea/vomiting.  Secondary Diagnosis:	Afib  Assessment and plan of treatment:	Restart your Lovenox and coumadin on Monday 4/23  Your new medications are cardizem CD and nadolol  Follow up with Dr. Alvares on Monday  Secondary Diagnosis:	Diabetes mellitus  Assessment and plan of treatment:	Do not restart your metformin  Follow up with Dr. Alvares on Monday

## 2018-04-20 NOTE — PROGRESS NOTE ADULT - PROVIDER SPECIALTY LIST ADULT
Cardiology
Cardiology
Hospitalist
Hospitalist
Urology
Cardiology
Hospitalist
Urology

## 2018-04-20 NOTE — DISCHARGE NOTE ADULT - MEDICATION SUMMARY - MEDICATIONS TO STOP TAKING
I will STOP taking the medications listed below when I get home from the hospital:    Cartia  mg/24 hours oral capsule, extended release  -- 1 cap(s) by mouth 2 times a day    digoxin 125 mcg (0.125 mg) oral tablet  -- 1 tab(s) by mouth once a day in am    Vasotec 20 mg oral tablet  -- 1 tab(s) by mouth 2 times a day    Toprol- mg oral tablet, extended release  -- 1 tab(s) by mouth 2 times a day    Norvasc 2.5 mg oral tablet  -- 1 tab(s) by mouth once a day in am    metFORMIN 1000 mg oral tablet  -- 1 tab(s) by mouth once a day

## 2018-04-20 NOTE — DISCHARGE NOTE ADULT - PATIENT PORTAL LINK FT
You can access the [facility] Patient Portal, offered by [facility], by registering with the following website: [email or web address]

## 2018-04-20 NOTE — DISCHARGE NOTE ADULT - PLAN OF CARE
Pain control Drink plenty of fluids.  No heavy lifting (greater than 10 pounds) or straining for 4 to 6 weeks.  You may shower, just pat white strips dry, they will fall off in a few weeks. Do not drive when taking pain medication.  Call Dr. Michelle's office  to schedule a follow up appointment.  Call the office if you have fever greater than 101, difficulty urinating, pain not relieved with pain medication, nausea/vomiting. Restart your Lovenox and coumadin on Monday 4/23  Your new medications are cardizem CD and nadolol  Follow up with Dr. Alvares on Monday Do not restart your metformin  Follow up with Dr. Alvares on Monday

## 2018-04-20 NOTE — PROGRESS NOTE ADULT - PROBLEM SELECTOR PROBLEM 2
Persistent atrial fibrillation
R/O Afib
R/O Afib
Chronic atrial fibrillation
R/O Afib

## 2018-04-20 NOTE — DISCHARGE NOTE ADULT - CARE PROVIDERS DIRECT ADDRESSES
[email or web address]

## 2018-04-20 NOTE — DISCHARGE NOTE ADULT - CARE PROVIDER_API CALL
Matt Michelle), Urology  [address] [address]  Phone: [phone]  Fax: [phone]    Emerson Alvares), Internal Medicine  [address] [place] [id number]  Phone: [phone]  Fax: [phone]

## 2018-04-20 NOTE — DISCHARGE NOTE ADULT - HOSPITAL COURSE
61 yo M underwent uncomplicated left lap partial nephrectomy on 4/16/18.  Postoperatively course uneventful, successful TOV on POD #1,  pain controlled, ambulating.  Return of GI function on POD # , diet advanced without incident.   Pt d/c on POD # to f/u with Dr. Kevin Rivera checked. 61 yo M h/o afib underwent uncomplicated left lap partial nephrectomy on 4/16/18.  Postoperatively pt had RVR with stable BP and no symptoms, had not taken home meds, meds given however pt remained in RVR, temp x 1 to 101.4, cardiology consult, Dr. Roche, obtained and pt was transferred to telemetry, placed on cardizem drip with good rate control, dig, toprol and enalapril d/c and nadolol started.  Pt remained afebrile and hemodynamically stable,  Successful TOV on POD #1,  pain controlled, ambulating. Pt had TRAN, CT obtained to r/o urine leak, revealed normal postoperative changes.  Return of GI function on POD #3 , diet advanced without incident.  Echo obtained, no acute findings.  Pt d/c on POD #4 on cardizem, nadolol, to restart coumadin and lovenox on 4/24 to f/u with Dr. Alvares and Dr. Michelle.  I-stop checked.

## 2018-04-20 NOTE — PROGRESS NOTE ADULT - SUBJECTIVE AND OBJECTIVE BOX
Subjective: Patient seen and examined. No new events except as noted.     SUBJECTIVE/ROS:  feels ok       MEDICATIONS:  MEDICATIONS  (STANDING):  acetaminophen  IVPB. 1000 milliGRAM(s) IV Intermittent once  atorvastatin 20 milliGRAM(s) Oral at bedtime  dextrose 5% + sodium chloride 0.45%. 1000 milliLiter(s) (75 mL/Hr) IV Continuous <Continuous>  dextrose 5%. 1000 milliLiter(s) (50 mL/Hr) IV Continuous <Continuous>  dextrose 50% Injectable 12.5 Gram(s) IV Push once  dextrose 50% Injectable 25 Gram(s) IV Push once  dextrose 50% Injectable 25 Gram(s) IV Push once  diltiazem Infusion 15 mG/Hr (15 mL/Hr) IV Continuous <Continuous>  heparin  Injectable 5000 Unit(s) SubCutaneous every 8 hours  insulin glargine Injectable (LANTUS) 10 Unit(s) SubCutaneous at bedtime  insulin lispro (HumaLOG) corrective regimen sliding scale   SubCutaneous three times a day before meals  nadolol 20 milliGRAM(s) Oral every 12 hours      PHYSICAL EXAM:  T(C): 37.8 (04-18-18 @ 08:56), Max: 38.6 (04-17-18 @ 14:46)  HR: 114 (04-18-18 @ 08:56) (94 - 156)  BP: 120/72 (04-18-18 @ 08:56) (100/66 - 148/73)  RR: 18 (04-18-18 @ 08:56) (17 - 18)  SpO2: 91% (04-18-18 @ 08:56) (91% - 97%)  Wt(kg): --  I&O's Summary    17 Apr 2018 07:01  -  18 Apr 2018 07:00  --------------------------------------------------------  IN: 0 mL / OUT: 550 mL / NET: -550 mL    18 Apr 2018 07:01  -  18 Apr 2018 10:47  --------------------------------------------------------  IN: 255 mL / OUT: 0 mL / NET: 255 mL          JVP: Normal  Neck: supple  Lung: clear   CV: S1 S2 , Murmur:  Abd: soft  Ext: No edema  neuro: Awake / alert  Psych: flat affect  Skin: normal       LABS/DATA:    CARDIAC MARKERS:                                10.2   13.25 )-----------( 176      ( 18 Apr 2018 06:43 )             31.0     04-18    133<L>  |  99  |  17  ----------------------------<  297<H>  4.1   |  22  |  2.04<H>    Ca    8.1<L>      18 Apr 2018 06:43      proBNP:   Lipid Profile:   HgA1c:   TSH:     TELE:  EKG:
Patient is a 62y old  Male who presents with a chief complaint of "I'm having left kidney mass removal" (06 Apr 2018 09:54)      SUBJECTIVE / OVERNIGHT EVENTS: No complaints today, (+) BM    MEDICATIONS  (STANDING):  atorvastatin 20 milliGRAM(s) Oral at bedtime  dextrose 5% + sodium chloride 0.45%. 1000 milliLiter(s) (75 mL/Hr) IV Continuous <Continuous>  dextrose 5%. 1000 milliLiter(s) (50 mL/Hr) IV Continuous <Continuous>  dextrose 50% Injectable 12.5 Gram(s) IV Push once  dextrose 50% Injectable 25 Gram(s) IV Push once  dextrose 50% Injectable 25 Gram(s) IV Push once  diltiazem    Tablet 90 milliGRAM(s) Oral four times a day  heparin  Injectable 5000 Unit(s) SubCutaneous every 8 hours  insulin glargine Injectable (LANTUS) 10 Unit(s) SubCutaneous at bedtime  insulin lispro (HumaLOG) corrective regimen sliding scale   SubCutaneous three times a day before meals  nadolol 20 milliGRAM(s) Oral every 12 hours  sodium biphosphate Rectal Enema 1 Enema Rectal <User Schedule>    MEDICATIONS  (PRN):  acetaminophen   Tablet 650 milliGRAM(s) Oral every 6 hours PRN For Temp greater than 38 C (100.4 F)  dextrose Gel 1 Dose(s) Oral once PRN Blood Glucose LESS THAN 70 milliGRAM(s)/deciliter  glucagon  Injectable 1 milliGRAM(s) IntraMuscular once PRN Glucose LESS THAN 70 milligrams/deciliter  ondansetron Injectable 4 milliGRAM(s) IV Push every 8 hours PRN Nausea and/or Vomiting  oxyCODONE    5 mG/acetaminophen 325 mG 1 Tablet(s) Oral every 4 hours PRN Moderate Pain  oxyCODONE    5 mG/acetaminophen 325 mG 2 Tablet(s) Oral every 6 hours PRN Severe Pain      Vital Signs Last 24 Hrs  T(C): 36.8 (19 Apr 2018 12:25), Max: 38.7 (18 Apr 2018 16:12)  T(F): 98.3 (19 Apr 2018 12:25), Max: 101.7 (18 Apr 2018 16:12)  HR: 106 (19 Apr 2018 12:25) (89 - 109)  BP: 146/82 (19 Apr 2018 12:25) (118/72 - 146/82)  BP(mean): 97 (19 Apr 2018 12:25) (88 - 97)  RR: 18 (19 Apr 2018 12:25) (16 - 20)  SpO2: 92% (19 Apr 2018 12:25) (91% - 94%)  CAPILLARY BLOOD GLUCOSE      POCT Blood Glucose.: 212 mg/dL (19 Apr 2018 11:46)  POCT Blood Glucose.: 219 mg/dL (19 Apr 2018 07:54)  POCT Blood Glucose.: 233 mg/dL (18 Apr 2018 21:47)  POCT Blood Glucose.: 229 mg/dL (18 Apr 2018 18:16)    I&O's Summary    18 Apr 2018 07:01  -  19 Apr 2018 07:00  --------------------------------------------------------  IN: 935 mL / OUT: 300 mL / NET: 635 mL    19 Apr 2018 07:01  -  19 Apr 2018 14:54  --------------------------------------------------------  IN: 1546 mL / OUT: 350 mL / NET: 1196 mL        PHYSICAL EXAM:  GENERAL: NAD, well-developed  HEAD:  Atraumatic, Normocephalic  EYES: EOMI, PERRLA, conjunctiva and sclera clear  NECK: Supple, No JVD  CHEST/LUNG: Clear to auscultation bilaterally; No wheeze  HEART: Regular rate and rhythm; No murmurs, rubs, or gallops  ABDOMEN: Soft, Nontender, Nondistended; Bowel sounds present  EXTREMITIES:  2+ Peripheral Pulses, No clubbing, cyanosis, or edema  PSYCH: AAOx3  NEUROLOGY: non-focal  SKIN: No rashes or lesions    LABS:                        9.0    13.82 )-----------( 178      ( 19 Apr 2018 05:00 )             27.6     04-19    136  |  100  |  21  ----------------------------<  229<H>  3.8   |  22  |  2.16<H>    Ca    8.1<L>      19 Apr 2018 05:00                RADIOLOGY & ADDITIONAL TESTS:    Imaging Personally Reviewed:    Consultant(s) Notes Reviewed:      Care Discussed with Consultants/Other Providers:
Patient is a 62y old  Male who presents with a chief complaint of "I'm having left kidney mass removal" (06 Apr 2018 09:54)      SUBJECTIVE / OVERNIGHT EVENTS: No complaints, denies any CP or SOB. (+) flatus, no BM.    MEDICATIONS  (STANDING):  acetaminophen  IVPB. 1000 milliGRAM(s) IV Intermittent once  atorvastatin 20 milliGRAM(s) Oral at bedtime  dextrose 5% + sodium chloride 0.45%. 1000 milliLiter(s) (75 mL/Hr) IV Continuous <Continuous>  dextrose 5%. 1000 milliLiter(s) (50 mL/Hr) IV Continuous <Continuous>  dextrose 50% Injectable 12.5 Gram(s) IV Push once  dextrose 50% Injectable 25 Gram(s) IV Push once  dextrose 50% Injectable 25 Gram(s) IV Push once  diltiazem Infusion 10 mG/Hr (10 mL/Hr) IV Continuous <Continuous>  heparin  Injectable 5000 Unit(s) SubCutaneous every 8 hours  insulin glargine Injectable (LANTUS) 10 Unit(s) SubCutaneous at bedtime  insulin lispro (HumaLOG) corrective regimen sliding scale   SubCutaneous three times a day before meals  nadolol 20 milliGRAM(s) Oral every 12 hours    MEDICATIONS  (PRN):  acetaminophen   Tablet 650 milliGRAM(s) Oral every 6 hours PRN For Temp greater than 38 C (100.4 F)  dextrose Gel 1 Dose(s) Oral once PRN Blood Glucose LESS THAN 70 milliGRAM(s)/deciliter  glucagon  Injectable 1 milliGRAM(s) IntraMuscular once PRN Glucose LESS THAN 70 milligrams/deciliter  ondansetron Injectable 4 milliGRAM(s) IV Push every 8 hours PRN Nausea and/or Vomiting  oxyCODONE    5 mG/acetaminophen 325 mG 1 Tablet(s) Oral every 4 hours PRN Moderate Pain  oxyCODONE    5 mG/acetaminophen 325 mG 2 Tablet(s) Oral every 6 hours PRN Severe Pain      Vital Signs Last 24 Hrs  T(C): 36.7 (18 Apr 2018 12:38), Max: 38.6 (17 Apr 2018 14:46)  T(F): 98.1 (18 Apr 2018 12:38), Max: 101.4 (17 Apr 2018 14:46)  HR: 100 (18 Apr 2018 12:38) (94 - 156)  BP: 135/74 (18 Apr 2018 12:38) (100/66 - 148/73)  BP(mean): 84 (18 Apr 2018 08:56) (84 - 84)  RR: 19 (18 Apr 2018 12:38) (17 - 19)  SpO2: 97% (18 Apr 2018 12:38) (91% - 97%)  CAPILLARY BLOOD GLUCOSE      POCT Blood Glucose.: 229 mg/dL (18 Apr 2018 12:09)  POCT Blood Glucose.: 234 mg/dL (18 Apr 2018 07:59)  POCT Blood Glucose.: 214 mg/dL (17 Apr 2018 23:41)  POCT Blood Glucose.: 250 mg/dL (17 Apr 2018 16:55)    I&O's Summary    17 Apr 2018 07:01  -  18 Apr 2018 07:00  --------------------------------------------------------  IN: 0 mL / OUT: 550 mL / NET: -550 mL    18 Apr 2018 07:01  -  18 Apr 2018 13:31  --------------------------------------------------------  IN: 255 mL / OUT: 0 mL / NET: 255 mL        PHYSICAL EXAM:  GENERAL: NAD, well-developed  HEAD:  Atraumatic, Normocephalic  EYES: EOMI, PERRLA, conjunctiva and sclera clear  NECK: Supple, No JVD  CHEST/LUNG: Clear to auscultation bilaterally; No wheeze  HEART: Irregular at 90; No murmurs, rubs, or gallops  ABDOMEN: Soft, Nontender, Nondistended; Bowel sounds present  EXTREMITIES:  2+ Peripheral Pulses, No clubbing, cyanosis, or edema  PSYCH: AAOx3  NEUROLOGY: non-focal  SKIN: No rashes or lesions    LABS:                        10.2   13.25 )-----------( 176      ( 18 Apr 2018 06:43 )             31.0     04-18    133<L>  |  99  |  17  ----------------------------<  297<H>  4.1   |  22  |  2.04<H>    Ca    8.1<L>      18 Apr 2018 06:43                RADIOLOGY & ADDITIONAL TESTS:    Imaging Personally Reviewed:    Consultant(s) Notes Reviewed:  Cardiology    Care Discussed with Consultants/Other Providers:
Subjective  Pt is complaining of abdominal pain, nausea, no vomiting.    Objective    Vital signs  T(F): , Max: 98.6 (04-16-18 @ 11:56)  HR: 90 (04-16-18 @ 23:02)  BP: 119/77 (04-16-18 @ 23:02)  SpO2: 100% (04-16-18 @ 23:02)  Wt(kg): --    Output     04-16 @ 07:01  -  04-17 @ 00:03  --------------------------------------------------------  IN: 937 mL / OUT: 200 mL / NET: 737 mL        Gen: No distress observed  Abd: soft, distended, appropriately tender, + steristrips, mildly saturated, dressing applied.  : + salvador, light pink urine    Labs        Urine Cx: ?  Blood Cx: ?    Imaging
Subjective:   Had persistent atrial fibrillation with RVR yesterday. Required cardizem gtt, transferred to Telemetry floor. This morning, he feels well. No GI function yet but tolerating clears. Ambulating. Pain controlled.    Objective:  99.6 148/73 111 95% on CPAP void 250  Gen: NAD AOx3  Abd: moderately distended, incisions c/d/i
Subjective:  Passing flatus. Less bloated today. No more nausea. Ambulating. Pain better controlled. HR controlled on cardizem drip. No cardiac symptoms. No tele events.    Objective:  Vital Signs Last 24 Hrs  T(C): 36.8 (19 Apr 2018 07:57), Max: 38.7 (18 Apr 2018 16:12)  T(F): 98.3 (19 Apr 2018 07:57), Max: 101.7 (18 Apr 2018 16:12)  HR: 91 (19 Apr 2018 07:57) (89 - 114)  BP: 125/75 (19 Apr 2018 07:57) (118/72 - 135/74)  BP(mean): 88 (19 Apr 2018 07:57) (84 - 88)  RR: 16 (19 Apr 2018 07:57) (16 - 20)  SpO2: 91% (19 Apr 2018 07:57) (91% - 97%)    Void 300     Gen: no acute distress  Abd: moderately distended, minimally tender               9.0    13.82 )-----------( 178      ( 04-19 @ 05:00 )             27.6                10.2   13.25 )-----------( 176      ( 04-18 @ 06:43 )             31.0                11.6   11.34 )-----------( 206      ( 04-17 @ 05:20 )             34.7     04-19    136  |  100  |  21  ----------------------------<  229<H>  3.8   |  22  |  2.16<H>    Ca    8.1<L>      19 Apr 2018 05:00
Subjective:  Yesterday, had CT scan to evaluate ileus -- no ileus or urine leak. Pt feels well. Passing flatus. Tolerating diet. Ambulating. No events on telemetry. No chest pain or SOB    Objective:  Vital Signs Last 24 Hrs  T(C): 36.9 (20 Apr 2018 08:22), Max: 37.1 (19 Apr 2018 20:15)  T(F): 98.4 (20 Apr 2018 08:22), Max: 98.7 (19 Apr 2018 20:15)  HR: 70 (20 Apr 2018 08:22) (70 - 106)  BP: 123/67 (20 Apr 2018 08:22) (121/66 - 146/83)  BP(mean): 97 (19 Apr 2018 12:25) (97 - 97)  RR: 20 (20 Apr 2018 08:22) (18 - 20)  SpO2: 94% (20 Apr 2018 08:22) (90% - 95%)    Void 650 cc    Gen: NAD  Abd: soft, mildly distended, nontender  INcisions c/d/i               9.5    11.44 )-----------( 205      ( 04-20 @ 05:31 )             29.5                9.0    13.82 )-----------( 178      ( 04-19 @ 05:00 )             27.6                10.2   13.25 )-----------( 176      ( 04-18 @ 06:43 )             31.0       04-20    135  |  100  |  19  ----------------------------<  174<H>  3.4<L>   |  21<L>  |  1.89<H>    Ca    8.4      20 Apr 2018 05:31
Subjective: Patient seen and examined. No new events except as noted.     SUBJECTIVE/ROS:  No chest pain, dyspnea, palpitation, or dizziness.       MEDICATIONS:  MEDICATIONS  (STANDING):  atorvastatin 20 milliGRAM(s) Oral at bedtime  dextrose 5% + sodium chloride 0.45%. 1000 milliLiter(s) (75 mL/Hr) IV Continuous <Continuous>  dextrose 5%. 1000 milliLiter(s) (50 mL/Hr) IV Continuous <Continuous>  dextrose 50% Injectable 12.5 Gram(s) IV Push once  dextrose 50% Injectable 25 Gram(s) IV Push once  dextrose 50% Injectable 25 Gram(s) IV Push once  diltiazem    Tablet 90 milliGRAM(s) Oral four times a day  heparin  Injectable 5000 Unit(s) SubCutaneous every 8 hours  insulin glargine Injectable (LANTUS) 15 Unit(s) SubCutaneous at bedtime  insulin lispro (HumaLOG) corrective regimen sliding scale   SubCutaneous three times a day before meals  nadolol 20 milliGRAM(s) Oral every 12 hours  sodium biphosphate Rectal Enema 1 Enema Rectal <User Schedule>      PHYSICAL EXAM:  T(C): 36.9 (04-20-18 @ 04:30), Max: 37.1 (04-19-18 @ 20:15)  HR: 77 (04-20-18 @ 04:30) (77 - 106)  BP: 121/66 (04-20-18 @ 04:30) (121/66 - 146/83)  RR: 18 (04-20-18 @ 04:30) (18 - 18)  SpO2: 92% (04-20-18 @ 04:30) (90% - 95%)  Wt(kg): --  I&O's Summary    19 Apr 2018 07:01  -  20 Apr 2018 07:00  --------------------------------------------------------  IN: 1771 mL / OUT: 1000 mL / NET: 771 mL          JVP: Normal  Neck: supple  Lung: clear   CV: S1 S2 , Murmur:  Abd: soft  Ext: No edema  neuro: Awake / alert  Psych: flat affect  Skin: normal       LABS/DATA:    CARDIAC MARKERS:                                9.5    11.44 )-----------( 205      ( 20 Apr 2018 05:31 )             29.5     04-20    135  |  100  |  19  ----------------------------<  174<H>  3.4<L>   |  21<L>  |  1.89<H>    Ca    8.4      20 Apr 2018 05:31      proBNP:   Lipid Profile:   HgA1c:   TSH:     TELE:  EKG:
Subjective: Patient seen and examined. No new events except as noted.     SUBJECTIVE/ROS:  No chest pain, dyspnea, palpitation, or dizziness.       MEDICATIONS:  MEDICATIONS  (STANDING):  atorvastatin 20 milliGRAM(s) Oral at bedtime  dextrose 5% + sodium chloride 0.45%. 1000 milliLiter(s) (75 mL/Hr) IV Continuous <Continuous>  dextrose 5%. 1000 milliLiter(s) (50 mL/Hr) IV Continuous <Continuous>  dextrose 50% Injectable 12.5 Gram(s) IV Push once  dextrose 50% Injectable 25 Gram(s) IV Push once  dextrose 50% Injectable 25 Gram(s) IV Push once  diltiazem Infusion 10 mG/Hr (10 mL/Hr) IV Continuous <Continuous>  heparin  Injectable 5000 Unit(s) SubCutaneous every 8 hours  insulin glargine Injectable (LANTUS) 10 Unit(s) SubCutaneous at bedtime  insulin lispro (HumaLOG) corrective regimen sliding scale   SubCutaneous three times a day before meals  nadolol 20 milliGRAM(s) Oral every 12 hours  sodium biphosphate Rectal Enema 1 Enema Rectal <User Schedule>      PHYSICAL EXAM:  T(C): 36.7 (04-19-18 @ 05:20), Max: 38.7 (04-18-18 @ 16:12)  HR: 97 (04-19-18 @ 05:20) (89 - 114)  BP: 124/72 (04-19-18 @ 05:20) (118/72 - 135/74)  RR: 18 (04-19-18 @ 05:20) (18 - 20)  SpO2: 94% (04-19-18 @ 05:20) (91% - 97%)  Wt(kg): --  I&O's Summary    18 Apr 2018 07:01  -  19 Apr 2018 07:00  --------------------------------------------------------  IN: 935 mL / OUT: 300 mL / NET: 635 mL        JVP: Normal  Neck: supple  Lung: clear   CV: S1 S2 , Murmur:  Abd: soft  Ext: No edema  neuro: Awake / alert  Psych: flat affect  Skin: normal       LABS/DATA:    CARDIAC MARKERS:                                9.0    13.82 )-----------( 178      ( 19 Apr 2018 05:00 )             27.6     04-19    136  |  100  |  21  ----------------------------<  229<H>  3.8   |  22  |  2.16<H>    Ca    8.1<L>      19 Apr 2018 05:00      proBNP:   Lipid Profile:   HgA1c:   TSH:     TELE:  EKG:
Patient is a 62y old  Male who presents with a chief complaint of "I'm having left kidney mass removal" (06 Apr 2018 09:54)      SUBJECTIVE / OVERNIGHT EVENTS: No complaints. (+) BM    MEDICATIONS  (STANDING):  atorvastatin 20 milliGRAM(s) Oral at bedtime  dextrose 5% + sodium chloride 0.45%. 1000 milliLiter(s) (75 mL/Hr) IV Continuous <Continuous>  dextrose 5%. 1000 milliLiter(s) (50 mL/Hr) IV Continuous <Continuous>  dextrose 50% Injectable 12.5 Gram(s) IV Push once  dextrose 50% Injectable 25 Gram(s) IV Push once  dextrose 50% Injectable 25 Gram(s) IV Push once  diltiazem    Tablet 90 milliGRAM(s) Oral four times a day  heparin  Injectable 5000 Unit(s) SubCutaneous every 8 hours  insulin glargine Injectable (LANTUS) 15 Unit(s) SubCutaneous at bedtime  insulin lispro (HumaLOG) corrective regimen sliding scale   SubCutaneous three times a day before meals  nadolol 20 milliGRAM(s) Oral every 12 hours  sodium biphosphate Rectal Enema 1 Enema Rectal <User Schedule>    MEDICATIONS  (PRN):  acetaminophen   Tablet 650 milliGRAM(s) Oral every 6 hours PRN For Temp greater than 38 C (100.4 F)  dextrose Gel 1 Dose(s) Oral once PRN Blood Glucose LESS THAN 70 milliGRAM(s)/deciliter  glucagon  Injectable 1 milliGRAM(s) IntraMuscular once PRN Glucose LESS THAN 70 milligrams/deciliter  ondansetron Injectable 4 milliGRAM(s) IV Push every 8 hours PRN Nausea and/or Vomiting  oxyCODONE    5 mG/acetaminophen 325 mG 1 Tablet(s) Oral every 4 hours PRN Moderate Pain  oxyCODONE    5 mG/acetaminophen 325 mG 2 Tablet(s) Oral every 6 hours PRN Severe Pain      Vital Signs Last 24 Hrs  T(C): 36.9 (20 Apr 2018 08:22), Max: 37.1 (19 Apr 2018 20:15)  T(F): 98.4 (20 Apr 2018 08:22), Max: 98.7 (19 Apr 2018 20:15)  HR: 70 (20 Apr 2018 08:22) (70 - 106)  BP: 123/67 (20 Apr 2018 08:22) (121/66 - 146/83)  BP(mean): 97 (19 Apr 2018 12:25) (97 - 97)  RR: 20 (20 Apr 2018 08:22) (18 - 20)  SpO2: 94% (20 Apr 2018 08:22) (90% - 95%)  CAPILLARY BLOOD GLUCOSE      POCT Blood Glucose.: 217 mg/dL (20 Apr 2018 08:00)  POCT Blood Glucose.: 211 mg/dL (19 Apr 2018 21:40)  POCT Blood Glucose.: 218 mg/dL (19 Apr 2018 16:42)  POCT Blood Glucose.: 212 mg/dL (19 Apr 2018 11:46)    I&O's Summary    19 Apr 2018 07:01  -  20 Apr 2018 07:00  --------------------------------------------------------  IN: 1771 mL / OUT: 1000 mL / NET: 771 mL    20 Apr 2018 07:01  -  20 Apr 2018 11:01  --------------------------------------------------------  IN: 0 mL / OUT: 300 mL / NET: -300 mL        PHYSICAL EXAM:  GENERAL: NAD, well-developed  HEAD:  Atraumatic, Normocephalic  EYES: EOMI, PERRLA, conjunctiva and sclera clear  NECK: Supple, No JVD  CHEST/LUNG: Clear to auscultation bilaterally; No wheeze  HEART: Irregular at 80; No murmurs, rubs, or gallops  ABDOMEN: Soft, Nontender, Nondistended; Bowel sounds present  EXTREMITIES:  2+ Peripheral Pulses, No clubbing, cyanosis, or edema  PSYCH: AAOx3  NEUROLOGY: non-focal  SKIN: No rashes or lesions    LABS:                        9.5    11.44 )-----------( 205      ( 20 Apr 2018 05:31 )             29.5     04-20    135  |  100  |  19  ----------------------------<  174<H>  3.4<L>   |  21<L>  |  1.89<H>    Ca    8.4      20 Apr 2018 05:31      PT/INR - ( 20 Apr 2018 05:31 )   PT: 12.1 SEC;   INR: 1.05          PTT - ( 20 Apr 2018 05:31 )  PTT:24.5 SEC          RADIOLOGY & ADDITIONAL TESTS:    Imaging Personally Reviewed:    Consultant(s) Notes Reviewed:      Care Discussed with Consultants/Other Providers:
Subjective:  Tachycardic overnight in atrial fib. DId not receive home dose of cardizem. No cardiac symptoms. Pain controlled. No GI function yet.    Objective:   Afeb 125/79 125 99% Luo 790 cc (removed)  Gen: NAD, AOx3  Abd: soft, mildly distended, appropriately tender, incisions c/d/i

## 2018-04-24 ENCOUNTER — APPOINTMENT (OUTPATIENT)
Dept: INTERNAL MEDICINE | Facility: CLINIC | Age: 63
End: 2018-04-24
Payer: COMMERCIAL

## 2018-04-24 ENCOUNTER — LABORATORY RESULT (OUTPATIENT)
Age: 63
End: 2018-04-24

## 2018-04-24 VITALS
SYSTOLIC BLOOD PRESSURE: 130 MMHG | HEIGHT: 70 IN | WEIGHT: 231 LBS | RESPIRATION RATE: 12 BRPM | HEART RATE: 81 BPM | BODY MASS INDEX: 33.07 KG/M2 | DIASTOLIC BLOOD PRESSURE: 80 MMHG

## 2018-04-24 DIAGNOSIS — Z01.818 ENCOUNTER FOR OTHER PREPROCEDURAL EXAMINATION: ICD-10-CM

## 2018-04-24 LAB — INR PPP: 1.6 RATIO

## 2018-04-24 PROCEDURE — 85610 PROTHROMBIN TIME: CPT | Mod: QW

## 2018-04-24 PROCEDURE — 36415 COLL VENOUS BLD VENIPUNCTURE: CPT

## 2018-04-24 PROCEDURE — 99496 TRANSJ CARE MGMT HIGH F2F 7D: CPT | Mod: 25

## 2018-04-25 ENCOUNTER — CLINICAL ADVICE (OUTPATIENT)
Age: 63
End: 2018-04-25

## 2018-04-25 ENCOUNTER — LABORATORY RESULT (OUTPATIENT)
Age: 63
End: 2018-04-25

## 2018-04-25 ENCOUNTER — APPOINTMENT (OUTPATIENT)
Dept: INTERNAL MEDICINE | Facility: CLINIC | Age: 63
End: 2018-04-25
Payer: COMMERCIAL

## 2018-04-25 VITALS
DIASTOLIC BLOOD PRESSURE: 80 MMHG | WEIGHT: 230 LBS | HEIGHT: 70 IN | SYSTOLIC BLOOD PRESSURE: 126 MMHG | RESPIRATION RATE: 16 BRPM | HEART RATE: 90 BPM | BODY MASS INDEX: 32.93 KG/M2

## 2018-04-25 DIAGNOSIS — Z09 ENCOUNTER FOR FOLLOW-UP EXAMINATION AFTER COMPLETED TREATMENT FOR CONDITIONS OTHER THAN MALIGNANT NEOPLASM: ICD-10-CM

## 2018-04-25 LAB
ALBUMIN SERPL ELPH-MCNC: 3.6 G/DL
ALP BLD-CCNC: 132 U/L
ALT SERPL-CCNC: 48 U/L
ANION GAP SERPL CALC-SCNC: 18 MMOL/L
AST SERPL-CCNC: 24 U/L
BILIRUB SERPL-MCNC: 0.5 MG/DL
BUN SERPL-MCNC: 17 MG/DL
CALCIUM SERPL-MCNC: 9.3 MG/DL
CHLORIDE SERPL-SCNC: 101 MMOL/L
CO2 SERPL-SCNC: 22 MMOL/L
CREAT SERPL-MCNC: 1.79 MG/DL
GLUCOSE SERPL-MCNC: 193 MG/DL
INR PPP: 2 RATIO
MAGNESIUM SERPL-MCNC: 1.7 MG/DL
POTASSIUM SERPL-SCNC: 4 MMOL/L
PROT SERPL-MCNC: 7 G/DL
SODIUM SERPL-SCNC: 141 MMOL/L

## 2018-04-25 PROCEDURE — 99214 OFFICE O/P EST MOD 30 MIN: CPT | Mod: 25

## 2018-04-25 PROCEDURE — 36415 COLL VENOUS BLD VENIPUNCTURE: CPT

## 2018-04-25 PROCEDURE — 85610 PROTHROMBIN TIME: CPT | Mod: QW

## 2018-04-26 ENCOUNTER — INPATIENT (INPATIENT)
Facility: HOSPITAL | Age: 63
LOS: 1 days | Discharge: HOME | End: 2018-04-28
Attending: UROLOGY | Admitting: UROLOGY
Payer: COMMERCIAL

## 2018-04-26 VITALS
OXYGEN SATURATION: 98 % | TEMPERATURE: 97 F | RESPIRATION RATE: 16 BRPM | SYSTOLIC BLOOD PRESSURE: 142 MMHG | HEART RATE: 67 BPM | DIASTOLIC BLOOD PRESSURE: 100 MMHG

## 2018-04-26 DIAGNOSIS — R31.9 HEMATURIA, UNSPECIFIED: ICD-10-CM

## 2018-04-26 DIAGNOSIS — Z98.890 OTHER SPECIFIED POSTPROCEDURAL STATES: Chronic | ICD-10-CM

## 2018-04-26 DIAGNOSIS — Z90.5 ACQUIRED ABSENCE OF KIDNEY: Chronic | ICD-10-CM

## 2018-04-26 LAB
ALBUMIN SERPL ELPH-MCNC: 3.6 G/DL — SIGNIFICANT CHANGE UP (ref 3.3–5)
ALP SERPL-CCNC: 117 U/L — SIGNIFICANT CHANGE UP (ref 40–120)
ALT FLD-CCNC: 49 U/L — HIGH (ref 4–41)
ANISOCYTOSIS BLD QL: SLIGHT — SIGNIFICANT CHANGE UP
APPEARANCE UR: SIGNIFICANT CHANGE UP
APTT BLD: 32 SEC — SIGNIFICANT CHANGE UP (ref 27.5–37.4)
AST SERPL-CCNC: 38 U/L — SIGNIFICANT CHANGE UP (ref 4–40)
BASOPHILS # BLD AUTO: 0.06 K/UL — SIGNIFICANT CHANGE UP (ref 0–0.2)
BASOPHILS NFR BLD AUTO: 0.5 % — SIGNIFICANT CHANGE UP (ref 0–2)
BASOPHILS NFR SPEC: 0 % — SIGNIFICANT CHANGE UP (ref 0–2)
BILIRUB SERPL-MCNC: 0.4 MG/DL — SIGNIFICANT CHANGE UP (ref 0.2–1.2)
BILIRUB UR-MCNC: NEGATIVE — SIGNIFICANT CHANGE UP
BLD GP AB SCN SERPL QL: NEGATIVE — SIGNIFICANT CHANGE UP
BLOOD UR QL VISUAL: HIGH
BUN SERPL-MCNC: 17 MG/DL — SIGNIFICANT CHANGE UP (ref 7–23)
CALCIUM SERPL-MCNC: 9.3 MG/DL — SIGNIFICANT CHANGE UP (ref 8.4–10.5)
CHLORIDE SERPL-SCNC: 98 MMOL/L — SIGNIFICANT CHANGE UP (ref 98–107)
CO2 SERPL-SCNC: 25 MMOL/L — SIGNIFICANT CHANGE UP (ref 22–31)
COLOR SPEC: HIGH
CREAT SERPL-MCNC: 1.71 MG/DL — HIGH (ref 0.5–1.3)
EOSINOPHIL # BLD AUTO: 0.08 K/UL — SIGNIFICANT CHANGE UP (ref 0–0.5)
EOSINOPHIL NFR BLD AUTO: 0.6 % — SIGNIFICANT CHANGE UP (ref 0–6)
EOSINOPHIL NFR FLD: 0 % — SIGNIFICANT CHANGE UP (ref 0–6)
GIANT PLATELETS BLD QL SMEAR: PRESENT — SIGNIFICANT CHANGE UP
GLUCOSE BLDC GLUCOMTR-MCNC: 199 MG/DL — HIGH (ref 70–99)
GLUCOSE BLDC GLUCOMTR-MCNC: 231 MG/DL — HIGH (ref 70–99)
GLUCOSE SERPL-MCNC: 204 MG/DL — HIGH (ref 70–99)
GLUCOSE UR-MCNC: NEGATIVE — SIGNIFICANT CHANGE UP
HCT VFR BLD CALC: 32.9 % — LOW (ref 39–50)
HCT VFR BLD CALC: 35.2 % — LOW (ref 39–50)
HGB BLD-MCNC: 10.5 G/DL — LOW (ref 13–17)
HGB BLD-MCNC: 11.2 G/DL — LOW (ref 13–17)
IMM GRANULOCYTES # BLD AUTO: 0.49 # — SIGNIFICANT CHANGE UP
IMM GRANULOCYTES NFR BLD AUTO: 4 % — HIGH (ref 0–1.5)
INR BLD: 2.22 — HIGH (ref 0.88–1.17)
KETONES UR-MCNC: SIGNIFICANT CHANGE UP
LEUKOCYTE ESTERASE UR-ACNC: HIGH
LYMPHOCYTES # BLD AUTO: 1.18 K/UL — SIGNIFICANT CHANGE UP (ref 1–3.3)
LYMPHOCYTES # BLD AUTO: 9.6 % — LOW (ref 13–44)
LYMPHOCYTES NFR SPEC AUTO: 7.8 % — LOW (ref 13–44)
MCHC RBC-ENTMCNC: 25.9 PG — LOW (ref 27–34)
MCHC RBC-ENTMCNC: 26.4 PG — LOW (ref 27–34)
MCHC RBC-ENTMCNC: 31.8 % — LOW (ref 32–36)
MCHC RBC-ENTMCNC: 31.9 % — LOW (ref 32–36)
MCV RBC AUTO: 81.5 FL — SIGNIFICANT CHANGE UP (ref 80–100)
MCV RBC AUTO: 82.7 FL — SIGNIFICANT CHANGE UP (ref 80–100)
METAMYELOCYTES # FLD: 0.9 % — SIGNIFICANT CHANGE UP (ref 0–1)
MONOCYTES # BLD AUTO: 1.33 K/UL — HIGH (ref 0–0.9)
MONOCYTES NFR BLD AUTO: 10.8 % — SIGNIFICANT CHANGE UP (ref 2–14)
MONOCYTES NFR BLD: 5.2 % — SIGNIFICANT CHANGE UP (ref 2–9)
MYELOCYTES NFR BLD: 0.9 % — HIGH (ref 0–0)
NEUTROPHIL AB SER-ACNC: 80.9 % — HIGH (ref 43–77)
NEUTROPHILS # BLD AUTO: 9.18 K/UL — HIGH (ref 1.8–7.4)
NEUTROPHILS NFR BLD AUTO: 74.5 % — SIGNIFICANT CHANGE UP (ref 43–77)
NEUTS BAND # BLD: 1.7 % — SIGNIFICANT CHANGE UP (ref 0–6)
NITRITE UR-MCNC: NEGATIVE — SIGNIFICANT CHANGE UP
NRBC # FLD: 0 — SIGNIFICANT CHANGE UP
NRBC # FLD: 0 — SIGNIFICANT CHANGE UP
OVALOCYTES BLD QL SMEAR: SLIGHT — SIGNIFICANT CHANGE UP
PH UR: 6 — SIGNIFICANT CHANGE UP (ref 4.6–8)
PLATELET # BLD AUTO: 419 K/UL — HIGH (ref 150–400)
PLATELET # BLD AUTO: 464 K/UL — HIGH (ref 150–400)
PLATELET COUNT - ESTIMATE: NORMAL — SIGNIFICANT CHANGE UP
PMV BLD: 9.3 FL — SIGNIFICANT CHANGE UP (ref 7–13)
PMV BLD: 9.4 FL — SIGNIFICANT CHANGE UP (ref 7–13)
POTASSIUM SERPL-MCNC: 4.2 MMOL/L — SIGNIFICANT CHANGE UP (ref 3.5–5.3)
POTASSIUM SERPL-SCNC: 4.2 MMOL/L — SIGNIFICANT CHANGE UP (ref 3.5–5.3)
PROT SERPL-MCNC: 8.2 G/DL — SIGNIFICANT CHANGE UP (ref 6–8.3)
PROT UR-MCNC: 100 MG/DL — SIGNIFICANT CHANGE UP
PROTHROM AB SERPL-ACNC: 26 SEC — HIGH (ref 9.8–13.1)
RBC # BLD: 3.98 M/UL — LOW (ref 4.2–5.8)
RBC # BLD: 4.32 M/UL — SIGNIFICANT CHANGE UP (ref 4.2–5.8)
RBC # FLD: 13.4 % — SIGNIFICANT CHANGE UP (ref 10.3–14.5)
RBC # FLD: 13.5 % — SIGNIFICANT CHANGE UP (ref 10.3–14.5)
RBC CASTS # UR COMP ASSIST: >50 — HIGH (ref 0–?)
RH IG SCN BLD-IMP: POSITIVE — SIGNIFICANT CHANGE UP
SODIUM SERPL-SCNC: 138 MMOL/L — SIGNIFICANT CHANGE UP (ref 135–145)
SP GR SPEC: 1.02 — SIGNIFICANT CHANGE UP (ref 1–1.04)
SURGICAL PATHOLOGY STUDY: SIGNIFICANT CHANGE UP
UROBILINOGEN FLD QL: NORMAL MG/DL — SIGNIFICANT CHANGE UP
VARIANT LYMPHS # BLD: 2.6 % — SIGNIFICANT CHANGE UP
WBC # BLD: 10.31 K/UL — SIGNIFICANT CHANGE UP (ref 3.8–10.5)
WBC # BLD: 12.32 K/UL — HIGH (ref 3.8–10.5)
WBC # FLD AUTO: 10.31 K/UL — SIGNIFICANT CHANGE UP (ref 3.8–10.5)
WBC # FLD AUTO: 12.32 K/UL — HIGH (ref 3.8–10.5)
WBC UR QL: SIGNIFICANT CHANGE UP (ref 0–?)

## 2018-04-26 PROCEDURE — 71046 X-RAY EXAM CHEST 2 VIEWS: CPT | Mod: 26

## 2018-04-26 RX ORDER — SODIUM CHLORIDE 9 MG/ML
1000 INJECTION, SOLUTION INTRAVENOUS
Qty: 0 | Refills: 0 | Status: DISCONTINUED | OUTPATIENT
Start: 2018-04-26 | End: 2018-04-28

## 2018-04-26 RX ORDER — DEXTROSE 50 % IN WATER 50 %
25 SYRINGE (ML) INTRAVENOUS ONCE
Qty: 0 | Refills: 0 | Status: DISCONTINUED | OUTPATIENT
Start: 2018-04-26 | End: 2018-04-28

## 2018-04-26 RX ORDER — INSULIN LISPRO 100/ML
VIAL (ML) SUBCUTANEOUS
Qty: 0 | Refills: 0 | Status: DISCONTINUED | OUTPATIENT
Start: 2018-04-26 | End: 2018-04-28

## 2018-04-26 RX ORDER — DILTIAZEM HCL 120 MG
240 CAPSULE, EXT RELEASE 24 HR ORAL
Qty: 0 | Refills: 0 | Status: DISCONTINUED | OUTPATIENT
Start: 2018-04-26 | End: 2018-04-27

## 2018-04-26 RX ORDER — METOPROLOL TARTRATE 50 MG
100 TABLET ORAL
Qty: 0 | Refills: 0 | Status: DISCONTINUED | OUTPATIENT
Start: 2018-04-26 | End: 2018-04-28

## 2018-04-26 RX ORDER — DEXTROSE 50 % IN WATER 50 %
12.5 SYRINGE (ML) INTRAVENOUS ONCE
Qty: 0 | Refills: 0 | Status: DISCONTINUED | OUTPATIENT
Start: 2018-04-26 | End: 2018-04-28

## 2018-04-26 RX ORDER — OXYCODONE AND ACETAMINOPHEN 5; 325 MG/1; MG/1
1 TABLET ORAL EVERY 4 HOURS
Qty: 0 | Refills: 0 | Status: DISCONTINUED | OUTPATIENT
Start: 2018-04-26 | End: 2018-04-28

## 2018-04-26 RX ORDER — GLUCAGON INJECTION, SOLUTION 0.5 MG/.1ML
1 INJECTION, SOLUTION SUBCUTANEOUS ONCE
Qty: 0 | Refills: 0 | Status: DISCONTINUED | OUTPATIENT
Start: 2018-04-26 | End: 2018-04-28

## 2018-04-26 RX ORDER — INSULIN LISPRO 100/ML
VIAL (ML) SUBCUTANEOUS AT BEDTIME
Qty: 0 | Refills: 0 | Status: DISCONTINUED | OUTPATIENT
Start: 2018-04-26 | End: 2018-04-28

## 2018-04-26 RX ORDER — OXYCODONE AND ACETAMINOPHEN 5; 325 MG/1; MG/1
2 TABLET ORAL EVERY 6 HOURS
Qty: 0 | Refills: 0 | Status: DISCONTINUED | OUTPATIENT
Start: 2018-04-26 | End: 2018-04-28

## 2018-04-26 RX ORDER — PHYTONADIONE (VIT K1) 5 MG
5 TABLET ORAL ONCE
Qty: 0 | Refills: 0 | Status: COMPLETED | OUTPATIENT
Start: 2018-04-26 | End: 2018-04-26

## 2018-04-26 RX ORDER — DILTIAZEM HCL 120 MG
360 CAPSULE, EXT RELEASE 24 HR ORAL DAILY
Qty: 0 | Refills: 0 | Status: DISCONTINUED | OUTPATIENT
Start: 2018-04-26 | End: 2018-04-26

## 2018-04-26 RX ORDER — ACETAMINOPHEN 500 MG
650 TABLET ORAL EVERY 6 HOURS
Qty: 0 | Refills: 0 | Status: DISCONTINUED | OUTPATIENT
Start: 2018-04-26 | End: 2018-04-28

## 2018-04-26 RX ORDER — DEXTROSE 50 % IN WATER 50 %
1 SYRINGE (ML) INTRAVENOUS ONCE
Qty: 0 | Refills: 0 | Status: DISCONTINUED | OUTPATIENT
Start: 2018-04-26 | End: 2018-04-28

## 2018-04-26 RX ORDER — ATORVASTATIN CALCIUM 80 MG/1
20 TABLET, FILM COATED ORAL AT BEDTIME
Qty: 0 | Refills: 0 | Status: DISCONTINUED | OUTPATIENT
Start: 2018-04-26 | End: 2018-04-28

## 2018-04-26 RX ORDER — SODIUM CHLORIDE 9 MG/ML
1000 INJECTION INTRAMUSCULAR; INTRAVENOUS; SUBCUTANEOUS
Qty: 0 | Refills: 0 | Status: DISCONTINUED | OUTPATIENT
Start: 2018-04-26 | End: 2018-04-28

## 2018-04-26 RX ORDER — DOCUSATE SODIUM 100 MG
100 CAPSULE ORAL THREE TIMES A DAY
Qty: 0 | Refills: 0 | Status: DISCONTINUED | OUTPATIENT
Start: 2018-04-26 | End: 2018-04-28

## 2018-04-26 RX ORDER — NADOLOL 80 MG/1
20 TABLET ORAL
Qty: 0 | Refills: 0 | Status: DISCONTINUED | OUTPATIENT
Start: 2018-04-26 | End: 2018-04-26

## 2018-04-26 RX ADMIN — Medication 5 MILLIGRAM(S): at 13:57

## 2018-04-26 RX ADMIN — Medication 4: at 16:19

## 2018-04-26 RX ADMIN — Medication 100 MILLIGRAM(S): at 22:00

## 2018-04-26 RX ADMIN — Medication 240 MILLIGRAM(S): at 22:33

## 2018-04-26 RX ADMIN — ATORVASTATIN CALCIUM 20 MILLIGRAM(S): 80 TABLET, FILM COATED ORAL at 22:00

## 2018-04-26 RX ADMIN — SODIUM CHLORIDE 125 MILLILITER(S): 9 INJECTION INTRAMUSCULAR; INTRAVENOUS; SUBCUTANEOUS at 13:57

## 2018-04-26 RX ADMIN — Medication 650 MILLIGRAM(S): at 19:32

## 2018-04-26 NOTE — ED PROVIDER NOTE - ATTENDING CONTRIBUTION TO CARE
I performed a face to face evaluation of this patient and performed a full history and physical examination on the patient.  I agree with the resident's history, physical examination, and plan of the patient.  Pt well appearing with hematuria, h/o nephrectomy on Coumadin, heart s1 s2 irregular, abd soft nontender, lungs cta, abd soft nontender, for labs, gu consult, admit

## 2018-04-26 NOTE — ED ADULT NURSE NOTE - CHPI ED SYMPTOMS NEG
no burning urination/no fever/no abdominal distension/no nausea/no diarrhea/no dysuria/no chills/no vomiting

## 2018-04-26 NOTE — H&P ADULT - FAMILY HISTORY
Family history of colon cancer     Sibling  Still living? Unknown  Diabetes mellitus, Age at diagnosis: Age Unknown

## 2018-04-26 NOTE — ED ADULT NURSE NOTE - OBJECTIVE STATEMENT
Patient reports hematuria that started 2 days ago. Patient states that the urine began to clear and restarted this AM. Patient had partial nephrectomy performed on 04/16/2018. Patient states that patient was advised to come in by MD. Patient denies dysuria, foul-smell or pus in urine. Patient reports urine appearing bright red, "foamy and bubbly." Patient denies any dizziness, light-headedness of syncope. Patient denies any flank pain.

## 2018-04-26 NOTE — ED ADULT TRIAGE NOTE - CHIEF COMPLAINT QUOTE
pt c/o hematuria since this am, had partial nephrectomy on 4/16/18 for growth on kidney, told by surgeon to come back to the hospital for possible second procedure. pmhx: afib on coumadin, cardiomyopathy, vaishali, dm, htn

## 2018-04-26 NOTE — H&P ADULT - PSH
H/O colonoscopy with polypectomy  03/2018  H/O partial nephrectomy  Right  H/O partial nephrectomy    History of tonsillectomy

## 2018-04-26 NOTE — ED PROVIDER NOTE - OBJECTIVE STATEMENT
62yM hx DM, afib on coumadin, s/p laparoscopic  left partial nephrectomy on 4/16/18. Pt had hematuria following procedure. Bleeding resolved Monday but started up again today. Bleeding heavy. No abd pain, dysuria, fever, chills, leg swelling, cp, sob, or other complaints. Pt called urologist and was told to go to ED.    Uro: Miguel Angel

## 2018-04-26 NOTE — H&P ADULT - HISTORY OF PRESENT ILLNESS
Pt is a 63 yo male who previously underwent right laparoscopic partial nephrectomy on 4/16/2018 for an upper pole RCC. Patient did well post-operatively and was discharged to home. Patient was discharged home on lovenox to be bridged once coumadin therapeutic. Patient states following first dose of lovenox on Sunday, he began to have bright red hematuria without clots. Patient stopped lovenox and states bleeding initially improved. Patient continued coumadin. Patient states he was doing well until this morning when he began to experience gross hematuria once again. Patient has been HDS and denies fevers. Patient is currently holding coumadin. Patient also has history of D2M and chronic afib.

## 2018-04-26 NOTE — H&P ADULT - NSHPLABSRESULTS_GEN_ALL_CORE
11.2                  Neurophils% (auto):   74.5   (04-26 @ 11:06):    12.32)-----------(464          Lymphocytes% (auto):  9.6                                           35.2                   Eosinphils% (auto):   0.6      Manual%: Neutrophils x    ; Lymphocytes x    ; Eosinophils x    ; Bands%: x    ; Blasts x          04-26    138  |  98  |  17  ----------------------------<  204<H>  4.2   |  25  |  1.71<H>    Ca    9.3      26 Apr 2018 11:06    TPro  8.2  /  Alb  3.6  /  TBili  0.4  /  DBili  x   /  AST  38  /  ALT  49<H>  /  AlkPhos  117  04-26    ( 04-26 @ 11:06 )   PT: 26.0 SEC;   INR: 2.22   aPTT: 32.0 SEC        RECENT CULTURES:

## 2018-04-26 NOTE — H&P ADULT - ASSESSMENT
Pt is a 63 yo male s/p right partial nephrectomy 4/16 who presents with gross hematura.  - CBC in 8 hours  - Vit K oral  - D2m diet, NPOmn  - Check BP  - Rack urine  - Hold AC

## 2018-04-26 NOTE — ED PROVIDER NOTE - PROGRESS NOTE DETAILS
Lisa: discussed case with Urology. They ask that pt be NPO. PMD Giorgio called at [phone]. Office closed. Lisa: received callback from [facility]. He states pt had tender abdomen yesterday. [facility] informed that pt is admitted to Urology service for procedure tmrw. Lisa: received callback from [facility]. He states pt had tender abdomen yesterday. [facility] informed that pt is admitted to Urology service for procedure tmrw. Per Urology no need for CT abd/pelvis at this time.

## 2018-04-27 DIAGNOSIS — R31.0 GROSS HEMATURIA: ICD-10-CM

## 2018-04-27 DIAGNOSIS — G47.33 OBSTRUCTIVE SLEEP APNEA (ADULT) (PEDIATRIC): ICD-10-CM

## 2018-04-27 DIAGNOSIS — R31.9 HEMATURIA, UNSPECIFIED: ICD-10-CM

## 2018-04-27 DIAGNOSIS — I48.2 CHRONIC ATRIAL FIBRILLATION: ICD-10-CM

## 2018-04-27 DIAGNOSIS — I42.9 CARDIOMYOPATHY, UNSPECIFIED: ICD-10-CM

## 2018-04-27 DIAGNOSIS — I48.91 UNSPECIFIED ATRIAL FIBRILLATION: ICD-10-CM

## 2018-04-27 DIAGNOSIS — E11.9 TYPE 2 DIABETES MELLITUS WITHOUT COMPLICATIONS: ICD-10-CM

## 2018-04-27 LAB
ALBUMIN SERPL ELPH-MCNC: 3.4 G/DL
ALP BLD-CCNC: 125 U/L
ALT SERPL-CCNC: 50 U/L
ANION GAP SERPL CALC-SCNC: 20 MMOL/L
APPEARANCE: CLEAR
APTT BLD: 28.1 SEC — SIGNIFICANT CHANGE UP (ref 27.5–37.4)
AST SERPL-CCNC: 39 U/L
BACTERIA UR CULT: NORMAL
BACTERIA UR CULT: SIGNIFICANT CHANGE UP
BACTERIA: NEGATIVE
BASOPHILS # BLD AUTO: 0.05 K/UL — SIGNIFICANT CHANGE UP (ref 0–0.2)
BASOPHILS # BLD AUTO: 0.21 K/UL
BASOPHILS NFR BLD AUTO: 0.4 % — SIGNIFICANT CHANGE UP (ref 0–2)
BASOPHILS NFR BLD AUTO: 1.7 %
BILIRUB SERPL-MCNC: 0.5 MG/DL
BILIRUBIN URINE: NEGATIVE
BLOOD URINE: ABNORMAL
BUN SERPL-MCNC: 14 MG/DL — SIGNIFICANT CHANGE UP (ref 7–23)
BUN SERPL-MCNC: 18 MG/DL
CALCIUM SERPL-MCNC: 8.6 MG/DL — SIGNIFICANT CHANGE UP (ref 8.4–10.5)
CALCIUM SERPL-MCNC: 8.9 MG/DL
CHLORIDE SERPL-SCNC: 101 MMOL/L
CHLORIDE SERPL-SCNC: 103 MMOL/L — SIGNIFICANT CHANGE UP (ref 98–107)
CO2 SERPL-SCNC: 21 MMOL/L
CO2 SERPL-SCNC: 22 MMOL/L — SIGNIFICANT CHANGE UP (ref 22–31)
COLOR: YELLOW
CREAT SERPL-MCNC: 1.55 MG/DL — HIGH (ref 0.5–1.3)
CREAT SERPL-MCNC: 1.74 MG/DL
EOSINOPHIL # BLD AUTO: 0.08 K/UL — SIGNIFICANT CHANGE UP (ref 0–0.5)
EOSINOPHIL # BLD AUTO: 0.11 K/UL
EOSINOPHIL NFR BLD AUTO: 0.7 % — SIGNIFICANT CHANGE UP (ref 0–6)
EOSINOPHIL NFR BLD AUTO: 0.9 %
GLUCOSE BLDC GLUCOMTR-MCNC: 124 MG/DL — HIGH (ref 70–99)
GLUCOSE BLDC GLUCOMTR-MCNC: 136 MG/DL — HIGH (ref 70–99)
GLUCOSE BLDC GLUCOMTR-MCNC: 190 MG/DL — HIGH (ref 70–99)
GLUCOSE BLDC GLUCOMTR-MCNC: 222 MG/DL — HIGH (ref 70–99)
GLUCOSE QUALITATIVE U: NEGATIVE MG/DL
GLUCOSE SERPL-MCNC: 205 MG/DL
GLUCOSE SERPL-MCNC: 231 MG/DL — HIGH (ref 70–99)
HCT VFR BLD CALC: 34.5 %
HCT VFR BLD CALC: 34.7 % — LOW (ref 39–50)
HGB BLD-MCNC: 11.2 G/DL
HGB BLD-MCNC: 11.3 G/DL — LOW (ref 13–17)
HYALINE CASTS: 7 /LPF
IMM GRANULOCYTES # BLD AUTO: 0.18 # — SIGNIFICANT CHANGE UP
IMM GRANULOCYTES NFR BLD AUTO: 1.6 % — HIGH (ref 0–1.5)
INR BLD: 1.35 — HIGH (ref 0.88–1.17)
KETONES URINE: NEGATIVE
LEUKOCYTE ESTERASE URINE: NEGATIVE
LYMPHOCYTES # BLD AUTO: 1.09 K/UL — SIGNIFICANT CHANGE UP (ref 1–3.3)
LYMPHOCYTES # BLD AUTO: 1.18 K/UL
LYMPHOCYTES # BLD AUTO: 9.7 % — LOW (ref 13–44)
LYMPHOCYTES NFR BLD AUTO: 9.5 %
MAN DIFF?: NORMAL
MCHC RBC-ENTMCNC: 26.2 PG — LOW (ref 27–34)
MCHC RBC-ENTMCNC: 27.4 PG
MCHC RBC-ENTMCNC: 32.5 GM/DL
MCHC RBC-ENTMCNC: 32.6 % — SIGNIFICANT CHANGE UP (ref 32–36)
MCV RBC AUTO: 80.5 FL — SIGNIFICANT CHANGE UP (ref 80–100)
MCV RBC AUTO: 84.4 FL
MICROSCOPIC-UA: NORMAL
MONOCYTES # BLD AUTO: 1 K/UL — HIGH (ref 0–0.9)
MONOCYTES # BLD AUTO: 1.71 K/UL
MONOCYTES NFR BLD AUTO: 13.8 %
MONOCYTES NFR BLD AUTO: 8.9 % — SIGNIFICANT CHANGE UP (ref 2–14)
NEUTROPHILS # BLD AUTO: 8.86 K/UL — HIGH (ref 1.8–7.4)
NEUTROPHILS # BLD AUTO: 9.2 K/UL
NEUTROPHILS NFR BLD AUTO: 74.1 %
NEUTROPHILS NFR BLD AUTO: 78.7 % — HIGH (ref 43–77)
NITRITE URINE: NEGATIVE
NRBC # FLD: 0 — SIGNIFICANT CHANGE UP
PH URINE: 5.5
PLATELET # BLD AUTO: 443 K/UL
PLATELET # BLD AUTO: 454 K/UL — HIGH (ref 150–400)
PMV BLD: 9.2 FL — SIGNIFICANT CHANGE UP (ref 7–13)
POTASSIUM SERPL-MCNC: 3.8 MMOL/L — SIGNIFICANT CHANGE UP (ref 3.5–5.3)
POTASSIUM SERPL-SCNC: 3.8 MMOL/L — SIGNIFICANT CHANGE UP (ref 3.5–5.3)
POTASSIUM SERPL-SCNC: 4.2 MMOL/L
PROT SERPL-MCNC: 7.1 G/DL
PROTEIN URINE: 100 MG/DL
PROTHROM AB SERPL-ACNC: 15.6 SEC — HIGH (ref 9.8–13.1)
RBC # BLD: 4.09 M/UL
RBC # BLD: 4.31 M/UL — SIGNIFICANT CHANGE UP (ref 4.2–5.8)
RBC # FLD: 13.6 % — SIGNIFICANT CHANGE UP (ref 10.3–14.5)
RBC # FLD: 15.1 %
RED BLOOD CELLS URINE: >720 /HPF
SODIUM SERPL-SCNC: 140 MMOL/L — SIGNIFICANT CHANGE UP (ref 135–145)
SODIUM SERPL-SCNC: 142 MMOL/L
SPECIFIC GRAVITY URINE: 1.01
SPECIMEN SOURCE: SIGNIFICANT CHANGE UP
SQUAMOUS EPITHELIAL CELLS: 3 /HPF
UROBILINOGEN URINE: NEGATIVE MG/DL
WBC # BLD: 11.26 K/UL — HIGH (ref 3.8–10.5)
WBC # FLD AUTO: 11.26 K/UL — HIGH (ref 3.8–10.5)
WBC # FLD AUTO: 12.42 K/UL
WHITE BLOOD CELLS URINE: 15 /HPF

## 2018-04-27 PROCEDURE — 99223 1ST HOSP IP/OBS HIGH 75: CPT

## 2018-04-27 PROCEDURE — 36247 INS CATH ABD/L-EXT ART 3RD: CPT

## 2018-04-27 PROCEDURE — 76937 US GUIDE VASCULAR ACCESS: CPT | Mod: 26

## 2018-04-27 PROCEDURE — 37244 VASC EMBOLIZE/OCCLUDE BLEED: CPT

## 2018-04-27 RX ORDER — INSULIN GLARGINE 100 [IU]/ML
10 INJECTION, SOLUTION SUBCUTANEOUS AT BEDTIME
Qty: 0 | Refills: 0 | Status: DISCONTINUED | OUTPATIENT
Start: 2018-04-27 | End: 2018-04-28

## 2018-04-27 RX ORDER — DILTIAZEM HCL 120 MG
240 CAPSULE, EXT RELEASE 24 HR ORAL
Qty: 0 | Refills: 0 | Status: DISCONTINUED | OUTPATIENT
Start: 2018-04-27 | End: 2018-04-28

## 2018-04-27 RX ADMIN — Medication 240 MILLIGRAM(S): at 18:18

## 2018-04-27 RX ADMIN — Medication 650 MILLIGRAM(S): at 17:30

## 2018-04-27 RX ADMIN — Medication 650 MILLIGRAM(S): at 16:34

## 2018-04-27 RX ADMIN — Medication 4: at 09:38

## 2018-04-27 RX ADMIN — ATORVASTATIN CALCIUM 20 MILLIGRAM(S): 80 TABLET, FILM COATED ORAL at 22:46

## 2018-04-27 RX ADMIN — Medication 2: at 13:25

## 2018-04-27 RX ADMIN — INSULIN GLARGINE 10 UNIT(S): 100 INJECTION, SOLUTION SUBCUTANEOUS at 22:46

## 2018-04-27 RX ADMIN — SODIUM CHLORIDE 125 MILLILITER(S): 9 INJECTION INTRAMUSCULAR; INTRAVENOUS; SUBCUTANEOUS at 05:07

## 2018-04-27 RX ADMIN — Medication 100 MILLIGRAM(S): at 05:07

## 2018-04-27 RX ADMIN — Medication 240 MILLIGRAM(S): at 05:07

## 2018-04-27 RX ADMIN — Medication 100 MILLIGRAM(S): at 22:46

## 2018-04-27 NOTE — CHART NOTE - NSCHARTNOTEFT_GEN_A_CORE
Patient Age: 62    Patient Gender: M    Procedure (including site / side if known):  R. renal angio poss embo    Diagnosis / Indication: s/p R. partial neph    Interventional Radiology Attending Physician: Rosalio    Ordering Attending Physician: Helga    Pertinent Medical History:    PAST MEDICAL & SURGICAL HISTORY:  Atrial fibrillation  Cardiomyopathy  MARIANA on CPAP  Other specified disorders of kidney and ureter: left kidney mass  Diabetes mellitus: type 2  Hypertension  H/O partial nephrectomy: Right  H/O partial nephrectomy  H/O colonoscopy with polypectomy: 03/2018  History of tonsillectomy        Pertinent Labs:                            10.5   10.31 )-----------( 419      ( 26 Apr 2018 15:22 )             32.9       04-26    138  |  98  |  17  ----------------------------<  204<H>  4.2   |  25  |  1.71<H>    Ca    9.3      26 Apr 2018 11:06    TPro  8.2  /  Alb  3.6  /  TBili  0.4  /  DBili  x   /  AST  38  /  ALT  49<H>  /  AlkPhos  117  04-26      PT/INR - ( 26 Apr 2018 11:06 )   PT: 26.0 SEC;   INR: 2.22          PTT - ( 26 Apr 2018 11:06 )  PTT:32.0 SEC    Patient and Family aware:   [ X ]Y   [  ]N

## 2018-04-27 NOTE — CONSULT NOTE ADULT - PROBLEM SELECTOR RECOMMENDATION 9
[id number] Score 5. Coumadin held in setting of gross hematuria, to go for embolization (was lovenox to coumadin bridge)  rate control - stabilized as outpt back on Cardizem 240 bid, Toprol 100 bid, c/w current regimen, f/u HR/BP

## 2018-04-27 NOTE — PROGRESS NOTE ADULT - SUBJECTIVE AND OBJECTIVE BOX
Overnight events:  None,     Subjective:  Pt states urine still hematuric, no clots, no difficulty voiding, no abdominal pain    Objective:    Vital signs  T(C): , Max: 38.3 (04-26-18 @ 19:29)  HR: 93 (04-27-18 @ 05:04)  BP: 149/81 (04-27-18 @ 05:04)  SpO2: 93% (04-27-18 @ 05:04)  Wt(kg): --    Output   Void: 2550        Gen: NAD  Abd: steris c/d/i, soft, nontender, right flank no ecchymoses or hematoma appreciated, no CVAT      Labs: P

## 2018-04-27 NOTE — PROGRESS NOTE ADULT - ASSESSMENT
63 yo male s/p R lap partial nephrectomy on 4/16/2018 Patient was discharged home on lovenox to be bridged once coumadin therapeutic. Patient states following first dose of lovenox on 4/22, he began to have bright red hematuria without clots. Patient stopped lovenox and states bleeding initially improved. Patient continued coumadin. Patient states he was doing well until4/26 when he began to experience gross hematuria once again, has remained hemodynamically stable, INR 2.2 was given po vitamin K.

## 2018-04-27 NOTE — CONSULT NOTE ADULT - ASSESSMENT
62M HTN, MARIANA on CPAP, cardiomyopathy, afib, DM, HCV (treated 10y ago), s/p right laparoscopic partial nephrectomy on 4/16/2018 for an upper pole RCC, had post op RVR, controlled on meds. Readmitted with hematuria after anticoagulation restarted

## 2018-04-28 ENCOUNTER — TRANSCRIPTION ENCOUNTER (OUTPATIENT)
Age: 63
End: 2018-04-28

## 2018-04-28 VITALS
HEART RATE: 87 BPM | DIASTOLIC BLOOD PRESSURE: 80 MMHG | TEMPERATURE: 98 F | OXYGEN SATURATION: 97 % | RESPIRATION RATE: 17 BRPM | SYSTOLIC BLOOD PRESSURE: 132 MMHG

## 2018-04-28 DIAGNOSIS — I42.9 CARDIOMYOPATHY, UNSPECIFIED: ICD-10-CM

## 2018-04-28 LAB
APTT BLD: 25.7 SEC — LOW (ref 27.5–37.4)
BUN SERPL-MCNC: 12 MG/DL — SIGNIFICANT CHANGE UP (ref 7–23)
CALCIUM SERPL-MCNC: 8.4 MG/DL — SIGNIFICANT CHANGE UP (ref 8.4–10.5)
CHLORIDE SERPL-SCNC: 104 MMOL/L — SIGNIFICANT CHANGE UP (ref 98–107)
CO2 SERPL-SCNC: 23 MMOL/L — SIGNIFICANT CHANGE UP (ref 22–31)
CREAT SERPL-MCNC: 1.45 MG/DL — HIGH (ref 0.5–1.3)
GLUCOSE BLDC GLUCOMTR-MCNC: 155 MG/DL — HIGH (ref 70–99)
GLUCOSE BLDC GLUCOMTR-MCNC: 258 MG/DL — HIGH (ref 70–99)
GLUCOSE SERPL-MCNC: 143 MG/DL — HIGH (ref 70–99)
HCT VFR BLD CALC: 32.7 % — LOW (ref 39–50)
HGB BLD-MCNC: 10.6 G/DL — LOW (ref 13–17)
INR BLD: 1.32 — HIGH (ref 0.88–1.17)
MCHC RBC-ENTMCNC: 26.2 PG — LOW (ref 27–34)
MCHC RBC-ENTMCNC: 32.4 % — SIGNIFICANT CHANGE UP (ref 32–36)
MCV RBC AUTO: 80.7 FL — SIGNIFICANT CHANGE UP (ref 80–100)
NRBC # FLD: 0 — SIGNIFICANT CHANGE UP
PLATELET # BLD AUTO: 380 K/UL — SIGNIFICANT CHANGE UP (ref 150–400)
PMV BLD: 9.3 FL — SIGNIFICANT CHANGE UP (ref 7–13)
POTASSIUM SERPL-MCNC: 3.5 MMOL/L — SIGNIFICANT CHANGE UP (ref 3.5–5.3)
POTASSIUM SERPL-SCNC: 3.5 MMOL/L — SIGNIFICANT CHANGE UP (ref 3.5–5.3)
PROTHROM AB SERPL-ACNC: 14.7 SEC — HIGH (ref 9.8–13.1)
RBC # BLD: 4.05 M/UL — LOW (ref 4.2–5.8)
RBC # FLD: 13.3 % — SIGNIFICANT CHANGE UP (ref 10.3–14.5)
SODIUM SERPL-SCNC: 140 MMOL/L — SIGNIFICANT CHANGE UP (ref 135–145)
WBC # BLD: 11.07 K/UL — HIGH (ref 3.8–10.5)
WBC # FLD AUTO: 11.07 K/UL — HIGH (ref 3.8–10.5)

## 2018-04-28 PROCEDURE — 99233 SBSQ HOSP IP/OBS HIGH 50: CPT | Mod: GC

## 2018-04-28 RX ORDER — METOPROLOL TARTRATE 50 MG
1 TABLET ORAL
Qty: 0 | Refills: 0 | COMMUNITY
Start: 2018-04-28

## 2018-04-28 RX ORDER — DILTIAZEM HCL 120 MG
1 CAPSULE, EXT RELEASE 24 HR ORAL
Qty: 0 | Refills: 0 | COMMUNITY
Start: 2018-04-28

## 2018-04-28 RX ORDER — ACETAMINOPHEN 500 MG
2 TABLET ORAL
Qty: 0 | Refills: 0 | COMMUNITY
Start: 2018-04-28

## 2018-04-28 RX ORDER — ENOXAPARIN SODIUM 100 MG/ML
100 INJECTION SUBCUTANEOUS
Qty: 0 | Refills: 0 | COMMUNITY

## 2018-04-28 RX ORDER — HEPARIN SODIUM 5000 [USP'U]/ML
5000 INJECTION INTRAVENOUS; SUBCUTANEOUS EVERY 8 HOURS
Qty: 0 | Refills: 0 | Status: DISCONTINUED | OUTPATIENT
Start: 2018-04-28 | End: 2018-04-28

## 2018-04-28 RX ORDER — MULTIVIT-MIN/FERROUS GLUCONATE 9 MG/15 ML
1 LIQUID (ML) ORAL
Qty: 0 | Refills: 0 | COMMUNITY

## 2018-04-28 RX ORDER — WARFARIN SODIUM 2.5 MG/1
1 TABLET ORAL
Qty: 0 | Refills: 0 | COMMUNITY

## 2018-04-28 RX ADMIN — Medication 100 MILLIGRAM(S): at 05:35

## 2018-04-28 RX ADMIN — HEPARIN SODIUM 5000 UNIT(S): 5000 INJECTION INTRAVENOUS; SUBCUTANEOUS at 13:31

## 2018-04-28 RX ADMIN — Medication 2: at 08:24

## 2018-04-28 RX ADMIN — SODIUM CHLORIDE 125 MILLILITER(S): 9 INJECTION INTRAMUSCULAR; INTRAVENOUS; SUBCUTANEOUS at 05:35

## 2018-04-28 RX ADMIN — Medication 650 MILLIGRAM(S): at 02:02

## 2018-04-28 RX ADMIN — Medication 6: at 12:15

## 2018-04-28 RX ADMIN — Medication 240 MILLIGRAM(S): at 05:35

## 2018-04-28 RX ADMIN — Medication 100 MILLIGRAM(S): at 13:31

## 2018-04-28 RX ADMIN — HEPARIN SODIUM 5000 UNIT(S): 5000 INJECTION INTRAVENOUS; SUBCUTANEOUS at 06:58

## 2018-04-28 RX ADMIN — Medication 650 MILLIGRAM(S): at 02:49

## 2018-04-28 NOTE — DISCHARGE NOTE ADULT - CARE PLAN
Principal Discharge DX:	Hematuria  Goal:	Resolution  Assessment and plan of treatment:	No heavy lifting or straining.  Avoid constipation  Call Dr. Michelle's office to schedule a follow up appointment.  Call the office if you have fever greater than 101, difficulty urinating, bloody urine, pain not relieved with pain medication, nausea/vomiting.  Secondary Diagnosis:	Atrial fibrillation  Assessment and plan of treatment:	Do not restart your coumadin or lovenox until Monday 4/30   Follow up with Dr. Alvares next week for monitoring of your INR  Secondary Diagnosis:	Diabetes mellitus  Assessment and plan of treatment:	Continue current home medications and follow up with your primary care provider  Secondary Diagnosis:	Hypertension  Assessment and plan of treatment:	Continue current home medications and follow up with your primary care provider

## 2018-04-28 NOTE — DISCHARGE NOTE ADULT - INSTRUCTIONS
No new dietary restrictions call md for any worsening pain ,fever above 100.2 ,bleeding or unable to urinate.

## 2018-04-28 NOTE — PROGRESS NOTE ADULT - ASSESSMENT
62M HTN, MARIANA on CPAP, cardiomyopathy, afib, DM2, HCV (treated 10y ago), s/p right laparoscopic partial nephrectomy on 4/16/2018 for an upper pole RCC, c/b post op RVR, controlled on meds now re-admitted with hematuria after anticoagulation resumed as outpatient, now s/p IR embolization with resolution of gross hematuria. 62M HTN, MARIANA on CPAP, cardiomyopathy, afib, uncontrolled DM2 (A1c =8.9), HCV (treated 10y ago), s/p right laparoscopic partial nephrectomy on 4/16/2018 for an upper pole RCC, c/b post op RVR, controlled on meds now re-admitted with hematuria after anticoagulation resumed as outpatient, now s/p IR embolization with resolution of gross hematuria.

## 2018-04-28 NOTE — DISCHARGE NOTE ADULT - MEDICATION SUMMARY - MEDICATIONS TO CHANGE
I will SWITCH the dose or number of times a day I take the medications listed below when I get home from the hospital:    Coumadin 5 mg oral tablet  -- 1 tab(s) by mouth once a day in pm restart on Monday, 4/23    Lovenox 100 mg/mL injectable solution  -- 100 milligram(s) subcutaneous every 12 hours, start on Monday, 4/23

## 2018-04-28 NOTE — PROGRESS NOTE ADULT - ASSESSMENT
61 yo male s/p R lap partial nephrectomy on 4/16/2018 Patient was discharged home on lovenox to be bridged once coumadin therapeutic. Patient states following first dose of lovenox on 4/22, he began to have bright red hematuria without clots. Patient stopped lovenox and states bleeding initially improved. Patient continued coumadin. Patient states he was doing well until4/26 when he began to experience gross hematuria once again, has remained hemodynamically stable, INR 2.2 was given po vitamin K. Pt now PPD #1 IR angio/embolization, urine yellow, no pain, HCT stable

## 2018-04-28 NOTE — PROGRESS NOTE ADULT - SUBJECTIVE AND OBJECTIVE BOX
Overnight events:  Pt underwent IR angio/embo yesterday without incident, urine remains yellow    Subjective:  Pt offers no complaints    Objective:    Vital signs  T(C): , Max: 37.2 (04-27-18 @ 09:26)  HR: 91 (04-28-18 @ 05:34)  BP: 146/94 (04-28-18 @ 05:34)  SpO2: 95% (04-28-18 @ 05:34)  Wt(kg): --    Output   Void: 1900 yellow        Gen: NAD  Abd: soft, nontender, no CVAT  Right groin dressing c/d/i, no hematoma    Labs                        10.6   11.07 )-----------( 380      ( 28 Apr 2018 06:32 )             32.7     28 Apr 2018 06:32    140    |  104    |  12     ----------------------------<  143    3.5     |  23     |  1.45     Ca    8.4        28 Apr 2018 06:32    Activated Partial Thromboplastin Time in AM (04.28.18 @ 06:32)    Activated Partial Thromboplastin Time: 25.7: Recommended therapeutic heparin range (full dose) for APTT  is 58-99 seconds.  Recommended therapeutic argatroban range is 1.5 to 3.0 times  the baseline APTT value, not to exceed 100 seconds.  Recommended therapeutic refludan range is 1.5 to 2.5 times  the baseline APTT. SEC    Prothrombin Time and INR, Plasma in AM (04.28.18 @ 06:32)    Prothrombin Time, Plasma: 14.7 SEC    INR: 1.32

## 2018-04-28 NOTE — PROGRESS NOTE ADULT - PROBLEM SELECTOR PROBLEM 2
Atrial fibrillation
Atrial fibrillation
Type 2 diabetes mellitus without complication, without long-term current use of insulin

## 2018-04-28 NOTE — DISCHARGE NOTE ADULT - PLAN OF CARE
Resolution No heavy lifting or straining.  Avoid constipation  Call Dr. Michelle's office to schedule a follow up appointment.  Call the office if you have fever greater than 101, difficulty urinating, bloody urine, pain not relieved with pain medication, nausea/vomiting. Do not restart your coumadin or lovenox until Monday 4/30   Follow up with Dr. Alvares next week for monitoring of your INR Continue current home medications and follow up with your primary care provider

## 2018-04-28 NOTE — PROGRESS NOTE ADULT - PROBLEM SELECTOR PLAN 1
-Currently rate-controlled with HR in the 80's-90's  -will c/w current doses of diltiazem and metoprolol  -With respect to AC, patient with [id number] of 5, case d/w, not that s/p -Currently rate-controlled with HR in the 80's-90's  -will c/w current doses of diltiazem and metoprolol  -With respect to AC, patient with [id number] of 5, case d/w IVAN, now that patient is s/p embolization, could resume anti-coagulation on Monday. Patient reports he is comfortable with Lovenox to Coumadin bridge, has Lovenox at home. No medical contraindications to discharge if patient is capable to independently bridging

## 2018-04-28 NOTE — DISCHARGE NOTE ADULT - CONDITIONS AT DISCHARGE
stable. no bleeding noted. voids well. right groin dressing intact. neuro vascular positive. ambulation tolerated well. abdominal old site Steri-strips intact. abdomen soft.

## 2018-04-28 NOTE — PROGRESS NOTE ADULT - ATTENDING COMMENTS
Urine is clear this am after INR normalized  IR today for renal angiogram, possible selective embolization
No medical contraindication to discharge.

## 2018-04-28 NOTE — PROGRESS NOTE ADULT - SUBJECTIVE AND OBJECTIVE BOX
Patient is a 62y old  Male who presents with a chief complaint of Hematuria (26 Apr 2018 13:47)      SUBJECTIVE / OVERNIGHT EVENTS: No events. Reports IR embolization procedure went smoothly. Now with yellow urine.     MEDICATIONS  (STANDING):  atorvastatin 20 milliGRAM(s) Oral at bedtime  dextrose 5%. 1000 milliLiter(s) (50 mL/Hr) IV Continuous <Continuous>  dextrose 50% Injectable 12.5 Gram(s) IV Push once  dextrose 50% Injectable 25 Gram(s) IV Push once  dextrose 50% Injectable 25 Gram(s) IV Push once  diltiazem    milliGRAM(s) Oral <User Schedule>  docusate sodium 100 milliGRAM(s) Oral three times a day  heparin  Injectable 5000 Unit(s) SubCutaneous every 8 hours  insulin glargine Injectable (LANTUS) 10 Unit(s) SubCutaneous at bedtime  insulin lispro (HumaLOG) corrective regimen sliding scale   SubCutaneous three times a day before meals  insulin lispro (HumaLOG) corrective regimen sliding scale   SubCutaneous at bedtime  metoprolol succinate  milliGRAM(s) Oral two times a day    MEDICATIONS  (PRN):  acetaminophen   Tablet. 650 milliGRAM(s) Oral every 6 hours PRN Mild Pain (1 - 3)  dextrose Gel 1 Dose(s) Oral once PRN Blood Glucose LESS THAN 70 milliGRAM(s)/deciliter  glucagon  Injectable 1 milliGRAM(s) IntraMuscular once PRN Glucose LESS THAN 70 milligrams/deciliter  oxyCODONE    5 mG/acetaminophen 325 mG 1 Tablet(s) Oral every 4 hours PRN Moderate Pain  oxyCODONE    5 mG/acetaminophen 325 mG 2 Tablet(s) Oral every 6 hours PRN Severe Pain      Vital Signs Last 24 Hrs  T(C): 37.2 (04-28-18 @ 08:39)  T(F): 99 (04-28-18 @ 08:39), Max: 99 (04-27-18 @ 13:44)  HR: 95 (04-28-18 @ 08:39) (78 - 96)  BP: 135/81 (04-28-18 @ 08:39)  BP(mean): --  RR: 20 (04-28-18 @ 08:39) (15 - 23)  SpO2: 99% (04-28-18 @ 08:39) (93% - 99%)  Wt(kg): --    04-27 @ 07:01 - 04-28 @ 07:00  --------------------------------------------------------  IN: 700 mL / OUT: 2725 mL / NET: -2025 mL    04-28 @ 07:01 - 04-28 @ 11:44  --------------------------------------------------------  IN: 0 mL / OUT: 650 mL / NET: -650 mL      CAPILLARY BLOOD GLUCOSE  190 (27 Apr 2018 13:24)      POCT Blood Glucose.: 155 mg/dL (28 Apr 2018 08:09)  POCT Blood Glucose.: 136 mg/dL (27 Apr 2018 22:41)  POCT Blood Glucose.: 124 mg/dL (27 Apr 2018 17:51)  POCT Blood Glucose.: 190 mg/dL (27 Apr 2018 12:47)    I&O's Summary    27 Apr 2018 07:01  -  28 Apr 2018 07:00  --------------------------------------------------------  IN: 700 mL / OUT: 2725 mL / NET: -2025 mL    28 Apr 2018 07:01  -  28 Apr 2018 11:44  --------------------------------------------------------  IN: 0 mL / OUT: 650 mL / NET: -650 mL        PHYSICAL EXAM:  GENERAL: NAD, well-developed  HEAD:  Atraumatic, Normocephalic  EYES: EOMI, PERRLA, conjunctiva and sclera clear  NECK: Supple, No JVD  CHEST/LUNG: Clear to auscultation bilaterally; No wheeze  HEART: Regular rate and rhythm; No murmurs, rubs, or gallops  ABDOMEN: Soft, Nontender, Nondistended; Bowel sounds present  EXTREMITIES:  2+ Peripheral Pulses, No clubbing, cyanosis, or edema  PSYCH: AAOx3  NEUROLOGY: non-focal  SKIN: No rashes or lesions    LABS:                        10.6   11.07 )-----------( 380      ( 28 Apr 2018 06:32 )             32.7     04-28    140  |  104  |  12  ----------------------------<  143<H>  3.5   |  23  |  1.45<H>    Ca    8.4      28 Apr 2018 06:32      PT/INR - ( 28 Apr 2018 06:32 )   PT: 14.7 SEC;   INR: 1.32          PTT - ( 28 Apr 2018 06:32 )  PTT:25.7 SEC          RADIOLOGY & ADDITIONAL TESTS:    Imaging Personally Reviewed:    Consultant(s) Notes Reviewed:      Care Discussed with Consultants/Other Providers:    Assessment and Plan: Patient is a 62y old  Male who presents with a chief complaint of Hematuria (26 Apr 2018 13:47)      SUBJECTIVE / OVERNIGHT EVENTS: No events. Reports IR embolization procedure went smoothly. Urine is yellow. Reports he had BM.    MEDICATIONS  (STANDING):  atorvastatin 20 milliGRAM(s) Oral at bedtime  dextrose 5%. 1000 milliLiter(s) (50 mL/Hr) IV Continuous <Continuous>  dextrose 50% Injectable 12.5 Gram(s) IV Push once  dextrose 50% Injectable 25 Gram(s) IV Push once  dextrose 50% Injectable 25 Gram(s) IV Push once  diltiazem    milliGRAM(s) Oral <User Schedule>  docusate sodium 100 milliGRAM(s) Oral three times a day  heparin  Injectable 5000 Unit(s) SubCutaneous every 8 hours  insulin glargine Injectable (LANTUS) 10 Unit(s) SubCutaneous at bedtime  insulin lispro (HumaLOG) corrective regimen sliding scale   SubCutaneous three times a day before meals  insulin lispro (HumaLOG) corrective regimen sliding scale   SubCutaneous at bedtime  metoprolol succinate  milliGRAM(s) Oral two times a day    MEDICATIONS  (PRN):  acetaminophen   Tablet. 650 milliGRAM(s) Oral every 6 hours PRN Mild Pain (1 - 3)  dextrose Gel 1 Dose(s) Oral once PRN Blood Glucose LESS THAN 70 milliGRAM(s)/deciliter  glucagon  Injectable 1 milliGRAM(s) IntraMuscular once PRN Glucose LESS THAN 70 milligrams/deciliter  oxyCODONE    5 mG/acetaminophen 325 mG 1 Tablet(s) Oral every 4 hours PRN Moderate Pain  oxyCODONE    5 mG/acetaminophen 325 mG 2 Tablet(s) Oral every 6 hours PRN Severe Pain      Vital Signs Last 24 Hrs  T(C): 37.2 (04-28-18 @ 08:39)  T(F): 99 (04-28-18 @ 08:39), Max: 99 (04-27-18 @ 13:44)  HR: 95 (04-28-18 @ 08:39) (78 - 96)  BP: 135/81 (04-28-18 @ 08:39)  BP(mean): --  RR: 20 (04-28-18 @ 08:39) (15 - 23)  SpO2: 99% (04-28-18 @ 08:39) (93% - 99%)  Wt(kg): --    04-27 @ 07:01 - 04-28 @ 07:00  --------------------------------------------------------  IN: 700 mL / OUT: 2725 mL / NET: -2025 mL    04-28 @ 07:01 - 04-28 @ 11:44  --------------------------------------------------------  IN: 0 mL / OUT: 650 mL / NET: -650 mL      CAPILLARY BLOOD GLUCOSE  190 (27 Apr 2018 13:24)      POCT Blood Glucose.: 155 mg/dL (28 Apr 2018 08:09)  POCT Blood Glucose.: 136 mg/dL (27 Apr 2018 22:41)  POCT Blood Glucose.: 124 mg/dL (27 Apr 2018 17:51)  POCT Blood Glucose.: 190 mg/dL (27 Apr 2018 12:47)    I&O's Summary    27 Apr 2018 07:01  -  28 Apr 2018 07:00  --------------------------------------------------------  IN: 700 mL / OUT: 2725 mL / NET: -2025 mL    28 Apr 2018 07:01  -  28 Apr 2018 11:44  --------------------------------------------------------  IN: 0 mL / OUT: 650 mL / NET: -650 mL        PHYSICAL EXAM:  GENERAL: NAD, well-developed  HEAD:  Atraumatic, Normocephalic  EYES: EOMI, PERRLA, conjunctiva and sclera clear  NECK: Supple, No JVD  CHEST/LUNG: Clear to auscultation bilaterally; No wheeze  HEART: Regular rate and rhythm; No murmurs, rubs, or gallops  ABDOMEN: Soft, Nontender, Nondistended; Bowel sounds present  EXTREMITIES:  2+ Peripheral Pulses, No clubbing, cyanosis, or edema  PSYCH: AAOx3  NEUROLOGY: non-focal  SKIN: No rashes or lesions    LABS:                        10.6   11.07 )-----------( 380      ( 28 Apr 2018 06:32 )             32.7     04-28    140  |  104  |  12  ----------------------------<  143<H>  3.5   |  23  |  1.45<H>    Ca    8.4      28 Apr 2018 06:32      PT/INR - ( 28 Apr 2018 06:32 )   PT: 14.7 SEC;   INR: 1.32          PTT - ( 28 Apr 2018 06:32 )  PTT:25.7 SEC          RADIOLOGY & ADDITIONAL TESTS:    Imaging Personally Reviewed:    Consultant(s) Notes Reviewed:      Care Discussed with Consultants/Other Providers:    Assessment and Plan: Patient is a 62y old  Male who presents with a chief complaint of Hematuria (26 Apr 2018 13:47)      SUBJECTIVE / OVERNIGHT EVENTS: No events. Reports IR embolization procedure went smoothly. Urine is yellow. Reports he had BM.    MEDICATIONS  (STANDING):  atorvastatin 20 milliGRAM(s) Oral at bedtime  dextrose 5%. 1000 milliLiter(s) (50 mL/Hr) IV Continuous <Continuous>  dextrose 50% Injectable 12.5 Gram(s) IV Push once  dextrose 50% Injectable 25 Gram(s) IV Push once  dextrose 50% Injectable 25 Gram(s) IV Push once  diltiazem    milliGRAM(s) Oral <User Schedule>  docusate sodium 100 milliGRAM(s) Oral three times a day  heparin  Injectable 5000 Unit(s) SubCutaneous every 8 hours  insulin glargine Injectable (LANTUS) 10 Unit(s) SubCutaneous at bedtime  insulin lispro (HumaLOG) corrective regimen sliding scale   SubCutaneous three times a day before meals  insulin lispro (HumaLOG) corrective regimen sliding scale   SubCutaneous at bedtime  metoprolol succinate  milliGRAM(s) Oral two times a day    MEDICATIONS  (PRN):  acetaminophen   Tablet. 650 milliGRAM(s) Oral every 6 hours PRN Mild Pain (1 - 3)  dextrose Gel 1 Dose(s) Oral once PRN Blood Glucose LESS THAN 70 milliGRAM(s)/deciliter  glucagon  Injectable 1 milliGRAM(s) IntraMuscular once PRN Glucose LESS THAN 70 milligrams/deciliter  oxyCODONE    5 mG/acetaminophen 325 mG 1 Tablet(s) Oral every 4 hours PRN Moderate Pain  oxyCODONE    5 mG/acetaminophen 325 mG 2 Tablet(s) Oral every 6 hours PRN Severe Pain      Vital Signs Last 24 Hrs  T(C): 37.2 (04-28-18 @ 08:39)  T(F): 99 (04-28-18 @ 08:39), Max: 99 (04-27-18 @ 13:44)  HR: 95 (04-28-18 @ 08:39) (78 - 96)  BP: 135/81 (04-28-18 @ 08:39)  BP(mean): --  RR: 20 (04-28-18 @ 08:39) (15 - 23)  SpO2: 99% (04-28-18 @ 08:39) (93% - 99%)  Wt(kg): --    04-27 @ 07:01 - 04-28 @ 07:00  --------------------------------------------------------  IN: 700 mL / OUT: 2725 mL / NET: -2025 mL    04-28 @ 07:01 - 04-28 @ 11:44  --------------------------------------------------------  IN: 0 mL / OUT: 650 mL / NET: -650 mL      CAPILLARY BLOOD GLUCOSE  190 (27 Apr 2018 13:24)      POCT Blood Glucose.: 155 mg/dL (28 Apr 2018 08:09)  POCT Blood Glucose.: 136 mg/dL (27 Apr 2018 22:41)  POCT Blood Glucose.: 124 mg/dL (27 Apr 2018 17:51)  POCT Blood Glucose.: 190 mg/dL (27 Apr 2018 12:47)    I&O's Summary    27 Apr 2018 07:01  -  28 Apr 2018 07:00  --------------------------------------------------------  IN: 700 mL / OUT: 2725 mL / NET: -2025 mL    28 Apr 2018 07:01  -  28 Apr 2018 11:44  --------------------------------------------------------  IN: 0 mL / OUT: 650 mL / NET: -650 mL    PHYSICAL EXAM  GENERAL: NAD, well-groomed, well-developed  EYES: conjunctiva and sclera clear  NECK: Supple, No JVD  RESPIRATORY: Clear to auscultation bilaterally; No wheezing  CARDIOVASCULAR: S1. S2, No murmurs,   GASTROINTESTINAL: Soft, Nontender, Nondistended; Bowel sounds present; incisions c/d/i  EXTREMITIES:  2+ Peripheral Pulses, No clubbing, cyanosis, or edema  NERVOUS SYSTEM:  Alert & Oriented X3; Moving all 4 extremities; No gross sensory deficits  SKIN: No rashes or lesions  PSYCH: calm, cooperative    LABS:                        10.6   11.07 )-----------( 380      ( 28 Apr 2018 06:32 )             32.7     04-28    140  |  104  |  12  ----------------------------<  143<H>  3.5   |  23  |  1.45<H>    Ca    8.4      28 Apr 2018 06:32    Creatinine, Serum: 1.55 mg/dL (04.27.18 @ 09:00)        PT/INR - ( 28 Apr 2018 06:32 )   PT: 14.7 SEC;   INR: 1.32          PTT - ( 28 Apr 2018 06:32 )  PTT:25.7 SEC      RADIOLOGY & ADDITIONAL TESTS:    Imaging Personally Reviewed:  < from: IR Procedure (04.27.18 @ 20:09) >    IMPRESSION:   Successful selective left renal angiogram and embolization of a fourth   order renal artery branch.    < end of copied text >      Consultant(s) Notes Reviewed:      Care Discussed with Consultants/Other Providers:    Assessment and Plan: Patient is a 62y old  Male who presents with a chief complaint of Hematuria (26 Apr 2018 13:47)      SUBJECTIVE / OVERNIGHT EVENTS: No events. Reports IR embolization procedure went smoothly. Urine is yellow. Reports he had BM.    MEDICATIONS  (STANDING):  atorvastatin 20 milliGRAM(s) Oral at bedtime  dextrose 5%. 1000 milliLiter(s) (50 mL/Hr) IV Continuous <Continuous>  dextrose 50% Injectable 12.5 Gram(s) IV Push once  dextrose 50% Injectable 25 Gram(s) IV Push once  dextrose 50% Injectable 25 Gram(s) IV Push once  diltiazem    milliGRAM(s) Oral <User Schedule>  docusate sodium 100 milliGRAM(s) Oral three times a day  heparin  Injectable 5000 Unit(s) SubCutaneous every 8 hours  insulin glargine Injectable (LANTUS) 10 Unit(s) SubCutaneous at bedtime  insulin lispro (HumaLOG) corrective regimen sliding scale   SubCutaneous three times a day before meals  insulin lispro (HumaLOG) corrective regimen sliding scale   SubCutaneous at bedtime  metoprolol succinate  milliGRAM(s) Oral two times a day    MEDICATIONS  (PRN):  acetaminophen   Tablet. 650 milliGRAM(s) Oral every 6 hours PRN Mild Pain (1 - 3)  dextrose Gel 1 Dose(s) Oral once PRN Blood Glucose LESS THAN 70 milliGRAM(s)/deciliter  glucagon  Injectable 1 milliGRAM(s) IntraMuscular once PRN Glucose LESS THAN 70 milligrams/deciliter  oxyCODONE    5 mG/acetaminophen 325 mG 1 Tablet(s) Oral every 4 hours PRN Moderate Pain  oxyCODONE    5 mG/acetaminophen 325 mG 2 Tablet(s) Oral every 6 hours PRN Severe Pain      Vital Signs Last 24 Hrs  T(C): 37.2 (04-28-18 @ 08:39)  T(F): 99 (04-28-18 @ 08:39), Max: 99 (04-27-18 @ 13:44)  HR: 95 (04-28-18 @ 08:39) (78 - 96)  BP: 135/81 (04-28-18 @ 08:39)  BP(mean): --  RR: 20 (04-28-18 @ 08:39) (15 - 23)  SpO2: 99% (04-28-18 @ 08:39) (93% - 99%)  Wt(kg): --    04-27 @ 07:01 - 04-28 @ 07:00  --------------------------------------------------------  IN: 700 mL / OUT: 2725 mL / NET: -2025 mL    04-28 @ 07:01 - 04-28 @ 11:44  --------------------------------------------------------  IN: 0 mL / OUT: 650 mL / NET: -650 mL      CAPILLARY BLOOD GLUCOSE  190 (27 Apr 2018 13:24)      POCT Blood Glucose.: 155 mg/dL (28 Apr 2018 08:09)  POCT Blood Glucose.: 136 mg/dL (27 Apr 2018 22:41)  POCT Blood Glucose.: 124 mg/dL (27 Apr 2018 17:51)  POCT Blood Glucose.: 190 mg/dL (27 Apr 2018 12:47)    I&O's Summary    27 Apr 2018 07:01  -  28 Apr 2018 07:00  --------------------------------------------------------  IN: 700 mL / OUT: 2725 mL / NET: -2025 mL    28 Apr 2018 07:01  -  28 Apr 2018 11:44  --------------------------------------------------------  IN: 0 mL / OUT: 650 mL / NET: -650 mL    PHYSICAL EXAM  GENERAL: NAD, well-groomed, well-developed  EYES: conjunctiva and sclera clear  NECK: Supple, No JVD  RESPIRATORY: Clear to auscultation bilaterally; No wheezing  CARDIOVASCULAR: S1. S2, No murmurs,   GASTROINTESTINAL: Soft, Nontender, Nondistended; Bowel sounds present; incisions c/d/i  EXTREMITIES:  2+ Peripheral Pulses, No clubbing, cyanosis, or edema  NERVOUS SYSTEM:  Alert & Oriented X3; Moving all 4 extremities; No gross sensory deficits  SKIN: No rashes or lesions  PSYCH: calm, cooperative    LABS:                        10.6   11.07 )-----------( 380      ( 28 Apr 2018 06:32 )             32.7     04-28    140  |  104  |  12  ----------------------------<  143<H>  3.5   |  23  |  1.45<H>    Ca    8.4      28 Apr 2018 06:32    Creatinine, Serum: 1.55 mg/dL (04.27.18 @ 09:00)    Hemoglobin A1C, Whole Blood: 8.9        PT/INR - ( 28 Apr 2018 06:32 )   PT: 14.7 SEC;   INR: 1.32          PTT - ( 28 Apr 2018 06:32 )  PTT:25.7 SEC      RADIOLOGY & ADDITIONAL TESTS:    Imaging Personally Reviewed:  < from: IR Procedure (04.27.18 @ 20:09) >    IMPRESSION:   Successful selective left renal angiogram and embolization of a fourth   order renal artery branch.    < end of copied text >      Consultant(s) Notes Reviewed:      Care Discussed with Consultants/Other Providers: IVAN (REYES Eugene) re: plan of care    Assessment and Plan:

## 2018-04-28 NOTE — DISCHARGE NOTE ADULT - HOSPITAL COURSE
61 yo M s/p right lap partial nephrectomy on 4/16 presented to the ED on 4/26 with hematuria after restarting lovenox/coumadin.  Hemodynamically stable, voiding without difficulty, INR 2.2.  Pt given vitamin K and then underwent IR angio/embolization on 4/27/18.  Post procedure urine remained yellow and HCT stable.  Pt d/c on 4/28 to restart anticoagulation on 4/30 and f/u with Dr. Michelle and Dr. Alvares.

## 2018-04-28 NOTE — DISCHARGE NOTE ADULT - CARE PROVIDERS DIRECT ADDRESSES
[email or web address]

## 2018-04-28 NOTE — DISCHARGE NOTE ADULT - MEDICATION SUMMARY - MEDICATIONS TO TAKE
I will START or STAY ON the medications listed below when I get home from the hospital:    magnesium oxide 50 mg 2x/week  -- Indication: For Home med    zinc 50 mg orally daily  -- Indication: For Home med    acetaminophen 325 mg oral tablet  -- 2 tab(s) by mouth every 6 hours, As needed, Mild Pain (1 - 3)  -- Indication: For Pain    dilTIAZem 240 mg/24 hours oral capsule, extended release  -- 1 cap(s) by mouth at 6am and 6pm  -- Indication: For Home med    Coumadin 5 mg oral tablet  -- 1 tab(s) by mouth once a day in pm restart on Monday, 4/30  -- Indication: For Home med restart on Monday 4/30    Lovenox 100 mg/mL injectable solution  -- 100 milligram(s) subcutaneous every 12 hours, start on Monday, 4/30  -- Indication: For Home med restart on Monday 4/30    glipiZIDE 10 mg oral tablet, extended release  -- 1 tab(s) by mouth 2 times a day  -- Indication: For Home med    Onglyza 5 mg oral tablet  -- 1 tab(s) by mouth once a day  -- Indication: For Home med    Crestor 5 mg oral tablet  -- 1 tab(s) by mouth once a day (at bedtime)  -- Indication: For Home med    metoprolol succinate 100 mg oral tablet, extended release  -- 1 tab(s) by mouth 2 times a day  -- Indication: For Home med    Colace 100 mg oral capsule  -- 1 cap(s) by mouth 3 times a day  -- Indication: For Constipation    Senna 8.6 mg oral tablet  -- 2 tab(s) by mouth once a day (at bedtime)  -- Indication: For Constipation

## 2018-04-28 NOTE — PROGRESS NOTE ADULT - PROBLEM SELECTOR PLAN 2
-Diabetes currently well-controlled in hospital but with A1c of 8.9 on 4/16  - Recommend outpatient f/u with primary provider -Diabetes currently well-controlled in hospital but with A1c of 8.9 on 4/16  - Recommend outpatient f/u with primary provider  - Can dc on home medications for now

## 2018-04-28 NOTE — DISCHARGE NOTE ADULT - PATIENT PORTAL LINK FT
You can access the [facility], offered by [facility], by registering with the following website: [email or web address]

## 2018-04-28 NOTE — DISCHARGE NOTE ADULT - MEDICATION SUMMARY - MEDICATIONS TO STOP TAKING
I will STOP taking the medications listed below when I get home from the hospital:    nadolol 20 mg oral tablet  -- 1 tab(s) by mouth every 12 hours    Cardizem  mg/24 hours oral capsule, extended release  -- 1 cap(s) by mouth once a day   -- It is very important that you take or use this exactly as directed.  Do not skip doses or discontinue unless directed by your doctor.  Some non-prescription drugs may aggravate your condition.  Read all labels carefully.  If a warning appears, check with your doctor before taking.  Swallow whole.  Do not crush.

## 2018-04-30 ENCOUNTER — LABORATORY RESULT (OUTPATIENT)
Age: 63
End: 2018-04-30

## 2018-04-30 ENCOUNTER — APPOINTMENT (OUTPATIENT)
Dept: INTERNAL MEDICINE | Facility: CLINIC | Age: 63
End: 2018-04-30
Payer: COMMERCIAL

## 2018-04-30 VITALS
WEIGHT: 230 LBS | SYSTOLIC BLOOD PRESSURE: 130 MMHG | BODY MASS INDEX: 32.93 KG/M2 | HEART RATE: 76 BPM | OXYGEN SATURATION: 98 % | HEIGHT: 70 IN | DIASTOLIC BLOOD PRESSURE: 80 MMHG

## 2018-04-30 LAB — INR PPP: 1.1 RATIO

## 2018-04-30 PROCEDURE — 99214 OFFICE O/P EST MOD 30 MIN: CPT

## 2018-04-30 PROCEDURE — 36415 COLL VENOUS BLD VENIPUNCTURE: CPT

## 2018-04-30 PROCEDURE — 99496 TRANSJ CARE MGMT HIGH F2F 7D: CPT | Mod: 25

## 2018-04-30 PROCEDURE — 85610 PROTHROMBIN TIME: CPT | Mod: QW

## 2018-04-30 RX ORDER — METOPROLOL SUCCINATE 100 MG/1
TABLET, EXTENDED RELEASE ORAL
Refills: 0 | Status: DISCONTINUED | COMMUNITY
End: 2018-04-30

## 2018-04-30 RX ORDER — ENALAPRIL MALEATE 5 MG/1
TABLET ORAL
Refills: 0 | Status: DISCONTINUED | COMMUNITY
End: 2018-04-30

## 2018-05-01 ENCOUNTER — APPOINTMENT (OUTPATIENT)
Dept: INTERNAL MEDICINE | Facility: CLINIC | Age: 63
End: 2018-05-01
Payer: COMMERCIAL

## 2018-05-01 VITALS
HEART RATE: 70 BPM | SYSTOLIC BLOOD PRESSURE: 138 MMHG | DIASTOLIC BLOOD PRESSURE: 80 MMHG | BODY MASS INDEX: 32.93 KG/M2 | WEIGHT: 230 LBS | HEIGHT: 70 IN

## 2018-05-01 LAB — INR PPP: 1.2 RATIO

## 2018-05-01 PROCEDURE — 85610 PROTHROMBIN TIME: CPT | Mod: QW

## 2018-05-01 PROCEDURE — 99213 OFFICE O/P EST LOW 20 MIN: CPT | Mod: 25

## 2018-05-02 ENCOUNTER — APPOINTMENT (OUTPATIENT)
Dept: INTERNAL MEDICINE | Facility: CLINIC | Age: 63
End: 2018-05-02
Payer: COMMERCIAL

## 2018-05-02 VITALS
HEART RATE: 82 BPM | WEIGHT: 230 LBS | BODY MASS INDEX: 32.93 KG/M2 | SYSTOLIC BLOOD PRESSURE: 140 MMHG | HEIGHT: 70 IN | DIASTOLIC BLOOD PRESSURE: 80 MMHG

## 2018-05-02 LAB — INR PPP: 1.4 RATIO

## 2018-05-02 PROCEDURE — 85610 PROTHROMBIN TIME: CPT | Mod: QW

## 2018-05-02 PROCEDURE — 99213 OFFICE O/P EST LOW 20 MIN: CPT | Mod: 25

## 2018-05-03 ENCOUNTER — FORM ENCOUNTER (OUTPATIENT)
Age: 63
End: 2018-05-03

## 2018-05-03 ENCOUNTER — RESULT CHARGE (OUTPATIENT)
Age: 63
End: 2018-05-03

## 2018-05-03 ENCOUNTER — APPOINTMENT (OUTPATIENT)
Dept: INTERNAL MEDICINE | Facility: CLINIC | Age: 63
End: 2018-05-03
Payer: COMMERCIAL

## 2018-05-03 VITALS
SYSTOLIC BLOOD PRESSURE: 130 MMHG | DIASTOLIC BLOOD PRESSURE: 82 MMHG | HEART RATE: 75 BPM | HEIGHT: 70 IN | BODY MASS INDEX: 32.93 KG/M2 | WEIGHT: 230 LBS

## 2018-05-03 LAB
ALBUMIN SERPL ELPH-MCNC: 3.5 G/DL
ALP BLD-CCNC: 108 U/L
ALT SERPL-CCNC: 30 U/L
ANION GAP SERPL CALC-SCNC: 14 MMOL/L
AST SERPL-CCNC: 22 U/L
BASOPHILS # BLD AUTO: 0.04 K/UL
BASOPHILS NFR BLD AUTO: 0.2 %
BILIRUB SERPL-MCNC: 0.3 MG/DL
BUN SERPL-MCNC: 17 MG/DL
CALCIUM SERPL-MCNC: 9.1 MG/DL
CHLORIDE SERPL-SCNC: 102 MMOL/L
CO2 SERPL-SCNC: 23 MMOL/L
CREAT SERPL-MCNC: 1.57 MG/DL
EOSINOPHIL # BLD AUTO: 0.13 K/UL
EOSINOPHIL NFR BLD AUTO: 0.7 %
GLUCOSE SERPL-MCNC: 271 MG/DL
HCT VFR BLD CALC: 34.8 %
HGB BLD-MCNC: 10.7 G/DL
IMM GRANULOCYTES NFR BLD AUTO: 0.6 %
INR PPP: 1.6 RATIO
LYMPHOCYTES # BLD AUTO: 1.39 K/UL
LYMPHOCYTES NFR BLD AUTO: 7.9 %
MAN DIFF?: NORMAL
MCHC RBC-ENTMCNC: 26.4 PG
MCHC RBC-ENTMCNC: 30.7 GM/DL
MCV RBC AUTO: 85.7 FL
MONOCYTES # BLD AUTO: 1.33 K/UL
MONOCYTES NFR BLD AUTO: 7.6 %
NEUTROPHILS # BLD AUTO: 14.55 K/UL
NEUTROPHILS NFR BLD AUTO: 83 %
PLATELET # BLD AUTO: 518 K/UL
POTASSIUM SERPL-SCNC: 4.5 MMOL/L
PROT SERPL-MCNC: 7.3 G/DL
RBC # BLD: 4.06 M/UL
RBC # FLD: 14.5 %
SODIUM SERPL-SCNC: 139 MMOL/L
WBC # FLD AUTO: 17.54 K/UL

## 2018-05-03 PROCEDURE — 85610 PROTHROMBIN TIME: CPT | Mod: QW

## 2018-05-03 PROCEDURE — 99213 OFFICE O/P EST LOW 20 MIN: CPT | Mod: 25

## 2018-05-04 ENCOUNTER — APPOINTMENT (OUTPATIENT)
Dept: INTERNAL MEDICINE | Facility: CLINIC | Age: 63
End: 2018-05-04
Payer: COMMERCIAL

## 2018-05-04 ENCOUNTER — LABORATORY RESULT (OUTPATIENT)
Age: 63
End: 2018-05-04

## 2018-05-04 ENCOUNTER — RESULT REVIEW (OUTPATIENT)
Age: 63
End: 2018-05-04

## 2018-05-04 ENCOUNTER — OUTPATIENT (OUTPATIENT)
Dept: OUTPATIENT SERVICES | Facility: HOSPITAL | Age: 63
LOS: 1 days | End: 2018-05-04
Payer: COMMERCIAL

## 2018-05-04 ENCOUNTER — APPOINTMENT (OUTPATIENT)
Dept: RADIOLOGY | Facility: CLINIC | Age: 63
End: 2018-05-04

## 2018-05-04 VITALS
HEART RATE: 108 BPM | BODY MASS INDEX: 32.28 KG/M2 | WEIGHT: 225 LBS | SYSTOLIC BLOOD PRESSURE: 126 MMHG | DIASTOLIC BLOOD PRESSURE: 80 MMHG

## 2018-05-04 DIAGNOSIS — Z98.890 OTHER SPECIFIED POSTPROCEDURAL STATES: Chronic | ICD-10-CM

## 2018-05-04 DIAGNOSIS — Z90.5 ACQUIRED ABSENCE OF KIDNEY: Chronic | ICD-10-CM

## 2018-05-04 DIAGNOSIS — J90 PLEURAL EFFUSION, NOT ELSEWHERE CLASSIFIED: ICD-10-CM

## 2018-05-04 DIAGNOSIS — R06.02 SHORTNESS OF BREATH: ICD-10-CM

## 2018-05-04 LAB — INR PPP: 2.1 RATIO

## 2018-05-04 PROCEDURE — 85610 PROTHROMBIN TIME: CPT | Mod: QW

## 2018-05-04 PROCEDURE — 36415 COLL VENOUS BLD VENIPUNCTURE: CPT

## 2018-05-04 PROCEDURE — 93000 ELECTROCARDIOGRAM COMPLETE: CPT

## 2018-05-04 PROCEDURE — 71046 X-RAY EXAM CHEST 2 VIEWS: CPT | Mod: 26

## 2018-05-04 PROCEDURE — 71046 X-RAY EXAM CHEST 2 VIEWS: CPT

## 2018-05-04 PROCEDURE — 99214 OFFICE O/P EST MOD 30 MIN: CPT | Mod: 25

## 2018-05-07 ENCOUNTER — RESULT REVIEW (OUTPATIENT)
Age: 63
End: 2018-05-07

## 2018-05-07 LAB
ALBUMIN SERPL ELPH-MCNC: 3.6 G/DL
ALP BLD-CCNC: 107 U/L
ALT SERPL-CCNC: 30 U/L
ANION GAP SERPL CALC-SCNC: 18 MMOL/L
AST SERPL-CCNC: 27 U/L
BASOPHILS # BLD AUTO: 0.02 K/UL
BASOPHILS NFR BLD AUTO: 0.2 %
BILIRUB SERPL-MCNC: 0.4 MG/DL
BUN SERPL-MCNC: 18 MG/DL
CALCIUM SERPL-MCNC: 9.7 MG/DL
CHLORIDE SERPL-SCNC: 101 MMOL/L
CO2 SERPL-SCNC: 22 MMOL/L
CREAT SERPL-MCNC: 1.64 MG/DL
DEPRECATED D DIMER PPP IA-ACNC: 2059 NG/ML DDU
EOSINOPHIL # BLD AUTO: 0.12 K/UL
EOSINOPHIL NFR BLD AUTO: 1.3 %
GLUCOSE SERPL-MCNC: 272 MG/DL
HCT VFR BLD CALC: 35 %
HGB BLD-MCNC: 10.9 G/DL
IMM GRANULOCYTES NFR BLD AUTO: 0.7 %
LYMPHOCYTES # BLD AUTO: 1.69 K/UL
LYMPHOCYTES NFR BLD AUTO: 17.8 %
MAN DIFF?: NORMAL
MCHC RBC-ENTMCNC: 25.8 PG
MCHC RBC-ENTMCNC: 31.1 GM/DL
MCV RBC AUTO: 82.9 FL
MONOCYTES # BLD AUTO: 0.78 K/UL
MONOCYTES NFR BLD AUTO: 8.2 %
NEUTROPHILS # BLD AUTO: 6.79 K/UL
NEUTROPHILS NFR BLD AUTO: 71.8 %
NT-PROBNP SERPL-MCNC: 1356 PG/ML
PLATELET # BLD AUTO: 533 K/UL
POTASSIUM SERPL-SCNC: 4.6 MMOL/L
PROT SERPL-MCNC: 7.6 G/DL
RBC # BLD: 4.22 M/UL
RBC # FLD: 14.6 %
SODIUM SERPL-SCNC: 141 MMOL/L
WBC # FLD AUTO: 9.47 K/UL

## 2018-05-09 ENCOUNTER — APPOINTMENT (OUTPATIENT)
Dept: INTERNAL MEDICINE | Facility: CLINIC | Age: 63
End: 2018-05-09
Payer: COMMERCIAL

## 2018-05-09 ENCOUNTER — LABORATORY RESULT (OUTPATIENT)
Age: 63
End: 2018-05-09

## 2018-05-09 VITALS
WEIGHT: 225 LBS | SYSTOLIC BLOOD PRESSURE: 130 MMHG | HEART RATE: 88 BPM | BODY MASS INDEX: 32.21 KG/M2 | HEIGHT: 70 IN | DIASTOLIC BLOOD PRESSURE: 75 MMHG

## 2018-05-09 LAB — INR PPP: 2 RATIO

## 2018-05-09 PROCEDURE — 99213 OFFICE O/P EST LOW 20 MIN: CPT | Mod: 25

## 2018-05-09 PROCEDURE — 85610 PROTHROMBIN TIME: CPT | Mod: QW

## 2018-05-09 PROCEDURE — 36415 COLL VENOUS BLD VENIPUNCTURE: CPT

## 2018-05-10 ENCOUNTER — RESULT REVIEW (OUTPATIENT)
Age: 63
End: 2018-05-10

## 2018-05-10 LAB
ALBUMIN SERPL ELPH-MCNC: 3.8 G/DL
ALP BLD-CCNC: 110 U/L
ALT SERPL-CCNC: 28 U/L
ANION GAP SERPL CALC-SCNC: 18 MMOL/L
AST SERPL-CCNC: 24 U/L
BASOPHILS # BLD AUTO: 0.02 K/UL
BASOPHILS NFR BLD AUTO: 0.2 %
BILIRUB SERPL-MCNC: 0.2 MG/DL
BUN SERPL-MCNC: 20 MG/DL
CALCIUM SERPL-MCNC: 9.8 MG/DL
CHLORIDE SERPL-SCNC: 100 MMOL/L
CO2 SERPL-SCNC: 21 MMOL/L
CREAT SERPL-MCNC: 1.71 MG/DL
EOSINOPHIL # BLD AUTO: 0.12 K/UL
EOSINOPHIL NFR BLD AUTO: 1.4 %
GLUCOSE SERPL-MCNC: 285 MG/DL
HCT VFR BLD CALC: 35.2 %
HGB BLD-MCNC: 11.3 G/DL
IMM GRANULOCYTES NFR BLD AUTO: 0.5 %
LYMPHOCYTES # BLD AUTO: 1.6 K/UL
LYMPHOCYTES NFR BLD AUTO: 18.2 %
MAGNESIUM SERPL-MCNC: 1.8 MG/DL
MAN DIFF?: NORMAL
MCHC RBC-ENTMCNC: 26.2 PG
MCHC RBC-ENTMCNC: 32.1 GM/DL
MCV RBC AUTO: 81.5 FL
MONOCYTES # BLD AUTO: 0.82 K/UL
MONOCYTES NFR BLD AUTO: 9.4 %
NEUTROPHILS # BLD AUTO: 6.17 K/UL
NEUTROPHILS NFR BLD AUTO: 70.3 %
PLATELET # BLD AUTO: 417 K/UL
POTASSIUM SERPL-SCNC: 4.2 MMOL/L
PROT SERPL-MCNC: 7.5 G/DL
RBC # BLD: 4.32 M/UL
RBC # FLD: 15.1 %
SODIUM SERPL-SCNC: 139 MMOL/L
WBC # FLD AUTO: 8.77 K/UL

## 2018-05-11 ENCOUNTER — APPOINTMENT (OUTPATIENT)
Dept: INTERNAL MEDICINE | Facility: CLINIC | Age: 63
End: 2018-05-11
Payer: COMMERCIAL

## 2018-05-11 ENCOUNTER — APPOINTMENT (OUTPATIENT)
Dept: UROLOGY | Facility: CLINIC | Age: 63
End: 2018-05-11
Payer: COMMERCIAL

## 2018-05-11 VITALS
RESPIRATION RATE: 17 BRPM | BODY MASS INDEX: 31.07 KG/M2 | HEIGHT: 70 IN | OXYGEN SATURATION: 98 % | SYSTOLIC BLOOD PRESSURE: 130 MMHG | WEIGHT: 217 LBS | DIASTOLIC BLOOD PRESSURE: 76 MMHG | HEART RATE: 85 BPM

## 2018-05-11 PROCEDURE — 99213 OFFICE O/P EST LOW 20 MIN: CPT | Mod: 25

## 2018-05-11 PROCEDURE — 36415 COLL VENOUS BLD VENIPUNCTURE: CPT

## 2018-05-11 PROCEDURE — 99024 POSTOP FOLLOW-UP VISIT: CPT

## 2018-05-14 ENCOUNTER — TRANSCRIPTION ENCOUNTER (OUTPATIENT)
Age: 63
End: 2018-05-14

## 2018-05-14 ENCOUNTER — APPOINTMENT (OUTPATIENT)
Dept: INTERNAL MEDICINE | Facility: CLINIC | Age: 63
End: 2018-05-14
Payer: COMMERCIAL

## 2018-05-14 ENCOUNTER — RESULT REVIEW (OUTPATIENT)
Age: 63
End: 2018-05-14

## 2018-05-14 LAB
ANION GAP SERPL CALC-SCNC: 16 MMOL/L
BUN SERPL-MCNC: 22 MG/DL
CALCIUM SERPL-MCNC: 9.6 MG/DL
CHLORIDE SERPL-SCNC: 100 MMOL/L
CO2 SERPL-SCNC: 25 MMOL/L
CREAT SERPL-MCNC: 1.89 MG/DL
GLUCOSE SERPL-MCNC: 242 MG/DL
NT-PROBNP SERPL-MCNC: 1035 PG/ML
POTASSIUM SERPL-SCNC: 4.7 MMOL/L
SODIUM SERPL-SCNC: 141 MMOL/L

## 2018-05-18 ENCOUNTER — RX RENEWAL (OUTPATIENT)
Age: 63
End: 2018-05-18

## 2018-05-24 ENCOUNTER — APPOINTMENT (OUTPATIENT)
Dept: PULMONOLOGY | Facility: CLINIC | Age: 63
End: 2018-05-24
Payer: COMMERCIAL

## 2018-05-24 VITALS
HEART RATE: 91 BPM | DIASTOLIC BLOOD PRESSURE: 78 MMHG | OXYGEN SATURATION: 98 % | SYSTOLIC BLOOD PRESSURE: 120 MMHG | WEIGHT: 229 LBS | HEIGHT: 70 IN | BODY MASS INDEX: 32.78 KG/M2

## 2018-05-24 PROCEDURE — 99214 OFFICE O/P EST MOD 30 MIN: CPT

## 2018-06-01 ENCOUNTER — LABORATORY RESULT (OUTPATIENT)
Age: 63
End: 2018-06-01

## 2018-06-01 ENCOUNTER — APPOINTMENT (OUTPATIENT)
Dept: INTERNAL MEDICINE | Facility: CLINIC | Age: 63
End: 2018-06-01
Payer: COMMERCIAL

## 2018-06-01 VITALS
WEIGHT: 226 LBS | HEART RATE: 78 BPM | SYSTOLIC BLOOD PRESSURE: 120 MMHG | BODY MASS INDEX: 32.35 KG/M2 | RESPIRATION RATE: 16 BRPM | HEIGHT: 70 IN | DIASTOLIC BLOOD PRESSURE: 80 MMHG

## 2018-06-01 DIAGNOSIS — R79.89 OTHER SPECIFIED ABNORMAL FINDINGS OF BLOOD CHEMISTRY: ICD-10-CM

## 2018-06-01 DIAGNOSIS — Z87.898 PERSONAL HISTORY OF OTHER SPECIFIED CONDITIONS: ICD-10-CM

## 2018-06-01 LAB — INR PPP: 1.8 RATIO

## 2018-06-01 PROCEDURE — 85610 PROTHROMBIN TIME: CPT | Mod: QW

## 2018-06-01 PROCEDURE — 99396 PREV VISIT EST AGE 40-64: CPT | Mod: 25

## 2018-06-01 PROCEDURE — 36415 COLL VENOUS BLD VENIPUNCTURE: CPT

## 2018-06-03 LAB
ALBUMIN SERPL ELPH-MCNC: 4.4 G/DL
ALP BLD-CCNC: 93 U/L
ALT SERPL-CCNC: 25 U/L
ANION GAP SERPL CALC-SCNC: 18 MMOL/L
AST SERPL-CCNC: 24 U/L
BASOPHILS # BLD AUTO: 0.03 K/UL
BASOPHILS NFR BLD AUTO: 0.4 %
BILIRUB SERPL-MCNC: 0.3 MG/DL
BUN SERPL-MCNC: 20 MG/DL
CALCIUM SERPL-MCNC: 9.9 MG/DL
CHLORIDE SERPL-SCNC: 104 MMOL/L
CHOLEST SERPL-MCNC: 154 MG/DL
CHOLEST/HDLC SERPL: 4.2 RATIO
CO2 SERPL-SCNC: 19 MMOL/L
CREAT SERPL-MCNC: 1.63 MG/DL
EOSINOPHIL # BLD AUTO: 0.11 K/UL
EOSINOPHIL NFR BLD AUTO: 1.4 %
GLUCOSE SERPL-MCNC: 220 MG/DL
HBA1C MFR BLD HPLC: 8.5 %
HCT VFR BLD CALC: 40.4 %
HDLC SERPL-MCNC: 37 MG/DL
HGB BLD-MCNC: 13.1 G/DL
IMM GRANULOCYTES NFR BLD AUTO: 0.5 %
LDLC SERPL CALC-MCNC: 68 MG/DL
LYMPHOCYTES # BLD AUTO: 1.83 K/UL
LYMPHOCYTES NFR BLD AUTO: 23.1 %
MAGNESIUM SERPL-MCNC: 1.9 MG/DL
MAN DIFF?: NORMAL
MCHC RBC-ENTMCNC: 26.6 PG
MCHC RBC-ENTMCNC: 32.4 GM/DL
MCV RBC AUTO: 81.9 FL
MONOCYTES # BLD AUTO: 0.67 K/UL
MONOCYTES NFR BLD AUTO: 8.4 %
NEUTROPHILS # BLD AUTO: 5.25 K/UL
NEUTROPHILS NFR BLD AUTO: 66.2 %
PLATELET # BLD AUTO: 299 K/UL
POTASSIUM SERPL-SCNC: 4.4 MMOL/L
PROT SERPL-MCNC: 8.1 G/DL
RBC # BLD: 4.93 M/UL
RBC # FLD: 16 %
SODIUM SERPL-SCNC: 141 MMOL/L
TRIGL SERPL-MCNC: 246 MG/DL
WBC # FLD AUTO: 7.93 K/UL

## 2018-06-04 ENCOUNTER — RESULT REVIEW (OUTPATIENT)
Age: 63
End: 2018-06-04

## 2018-06-15 ENCOUNTER — APPOINTMENT (OUTPATIENT)
Dept: INTERNAL MEDICINE | Facility: CLINIC | Age: 63
End: 2018-06-15
Payer: COMMERCIAL

## 2018-06-15 VITALS
SYSTOLIC BLOOD PRESSURE: 125 MMHG | HEIGHT: 70 IN | HEART RATE: 92 BPM | WEIGHT: 226 LBS | OXYGEN SATURATION: 98 % | BODY MASS INDEX: 32.35 KG/M2 | DIASTOLIC BLOOD PRESSURE: 80 MMHG

## 2018-06-15 LAB — INR PPP: 2.1 RATIO

## 2018-06-15 PROCEDURE — 85610 PROTHROMBIN TIME: CPT | Mod: QW

## 2018-06-15 PROCEDURE — 99213 OFFICE O/P EST LOW 20 MIN: CPT | Mod: 25

## 2018-06-19 ENCOUNTER — FORM ENCOUNTER (OUTPATIENT)
Age: 63
End: 2018-06-19

## 2018-06-20 ENCOUNTER — OUTPATIENT (OUTPATIENT)
Dept: OUTPATIENT SERVICES | Facility: HOSPITAL | Age: 63
LOS: 1 days | End: 2018-06-20
Payer: COMMERCIAL

## 2018-06-20 ENCOUNTER — APPOINTMENT (OUTPATIENT)
Dept: RADIOLOGY | Facility: CLINIC | Age: 63
End: 2018-06-20

## 2018-06-20 DIAGNOSIS — J90 PLEURAL EFFUSION, NOT ELSEWHERE CLASSIFIED: ICD-10-CM

## 2018-06-20 DIAGNOSIS — Z90.5 ACQUIRED ABSENCE OF KIDNEY: Chronic | ICD-10-CM

## 2018-06-20 DIAGNOSIS — Z98.890 OTHER SPECIFIED POSTPROCEDURAL STATES: Chronic | ICD-10-CM

## 2018-06-20 PROCEDURE — 71046 X-RAY EXAM CHEST 2 VIEWS: CPT | Mod: 26

## 2018-06-20 PROCEDURE — 71046 X-RAY EXAM CHEST 2 VIEWS: CPT

## 2018-07-10 ENCOUNTER — RX RENEWAL (OUTPATIENT)
Age: 63
End: 2018-07-10

## 2018-07-16 ENCOUNTER — APPOINTMENT (OUTPATIENT)
Dept: INTERNAL MEDICINE | Facility: CLINIC | Age: 63
End: 2018-07-16
Payer: COMMERCIAL

## 2018-07-16 VITALS
HEIGHT: 70 IN | OXYGEN SATURATION: 98 % | WEIGHT: 228 LBS | SYSTOLIC BLOOD PRESSURE: 124 MMHG | BODY MASS INDEX: 32.64 KG/M2 | HEART RATE: 78 BPM | DIASTOLIC BLOOD PRESSURE: 80 MMHG

## 2018-07-16 LAB — INR PPP: 1.5 RATIO

## 2018-07-16 PROCEDURE — 85610 PROTHROMBIN TIME: CPT | Mod: QW

## 2018-07-16 PROCEDURE — 99213 OFFICE O/P EST LOW 20 MIN: CPT | Mod: 25

## 2018-07-16 NOTE — ASSESSMENT
[FreeTextEntry1] :  Patient advised to continue present medications with diet and exercise. Patient will follow up with specialists as scheduled. Patient will return to the office in 2-3 weeks/fasting labs in sept\par \par \par \par \par 24-hour urine was done 3/17\par specialists include..\par 1. cardiology-Dr. Eckert\par 2. ophthalmology-Dr.Goldberg-overdue " to do"\par 3. podiatry-NO LONGER GOES\par 4. apnea doctor-Dr. Live\par 5. gastro-"-Dr. Sanchez\par 6.-Dr. Michelle\par colonoscopy 3/18\par abdominal sonogram=6/17\par discussed shingles vaccine\par carotid sonogram was 11/16 with +plaque,no stenosis\par Echo was 12/16\par Chest x-ray was done 5/4/18 showing small left effusion=repeat 2/2018=clear

## 2018-07-16 NOTE — HISTORY OF PRESENT ILLNESS
[FreeTextEntry1] : Patient presents for INR check/follow up on hypertension. Patient is currently on Coumadin for A. fib and on Cartia/Toprol for hypertension.\par -Feels well without complaints\par  \par \par \par

## 2018-07-16 NOTE — PHYSICAL EXAM
[General Appearance - In No Acute Distress] : in no acute distress [] : no respiratory distress [Respiration, Rhythm And Depth] : normal respiratory rhythm and effort [Auscultation Breath Sounds / Voice Sounds] : lungs were clear to auscultation bilaterally [Affect] : the affect was normal [Mood] : the mood was normal [FreeTextEntry1] : + irreg with CVR

## 2018-07-23 ENCOUNTER — RX RENEWAL (OUTPATIENT)
Age: 63
End: 2018-07-23

## 2018-07-23 PROBLEM — Z80.0 FAMILY HISTORY OF COLON CANCER: Status: ACTIVE | Noted: 2017-03-27

## 2018-07-31 ENCOUNTER — APPOINTMENT (OUTPATIENT)
Dept: INTERNAL MEDICINE | Facility: CLINIC | Age: 63
End: 2018-07-31
Payer: COMMERCIAL

## 2018-07-31 VITALS
WEIGHT: 226 LBS | DIASTOLIC BLOOD PRESSURE: 80 MMHG | BODY MASS INDEX: 32.35 KG/M2 | SYSTOLIC BLOOD PRESSURE: 120 MMHG | HEIGHT: 70 IN | HEART RATE: 75 BPM | RESPIRATION RATE: 16 BRPM

## 2018-07-31 PROBLEM — I10 ESSENTIAL (PRIMARY) HYPERTENSION: Chronic | Status: ACTIVE | Noted: 2018-04-06

## 2018-07-31 PROBLEM — G47.33 OBSTRUCTIVE SLEEP APNEA (ADULT) (PEDIATRIC): Chronic | Status: ACTIVE | Noted: 2018-04-06

## 2018-07-31 PROBLEM — I42.9 CARDIOMYOPATHY, UNSPECIFIED: Chronic | Status: ACTIVE | Noted: 2018-04-06

## 2018-07-31 PROBLEM — I48.91 UNSPECIFIED ATRIAL FIBRILLATION: Chronic | Status: ACTIVE | Noted: 2018-04-06

## 2018-07-31 LAB — INR PPP: 2.7 RATIO

## 2018-07-31 PROCEDURE — 85610 PROTHROMBIN TIME: CPT | Mod: QW

## 2018-07-31 PROCEDURE — 99213 OFFICE O/P EST LOW 20 MIN: CPT | Mod: 25

## 2018-07-31 NOTE — ASSESSMENT
[FreeTextEntry1] : Patient with chronic atrial for relation with controlled ventricular response and therapeutic INR if will continue present dose of Coumadin.\par \par Hypertension is controlled.\par \par Followup INR in 2 weeks.\par \par 24-hour urine was done 3/17\par specialists include..\par 1. cardiology-Dr. Eckert\par 2. ophthalmology-Dr.Goldberg-overdue " to do"\par 3. podiatry-NO LONGER GOES\par 4. apnea doctor-Dr. Live\par 5. gastro-"-Dr. Sanchez\par 6.-Dr. Michelle\par colonoscopy 3/18\par abdominal sonogram=6/17\par discussed shingles vaccine\par carotid sonogram was 11/16 with +plaque,no stenosis\par Echo was 12/16\par Chest x-ray was done 5/4/18 showing small left effusion=repeat 2/2018=clear

## 2018-07-31 NOTE — HISTORY OF PRESENT ILLNESS
[FreeTextEntry1] : Patient presents for INR check/follow up on hypertension. Patient is currently on Coumadin for A. fib and on Cartia/Toprol for hypertension.\par -Feels well without complaints\par  \par Slowly improving and feeling better after his nephrectomy.\par \par \par

## 2018-08-14 ENCOUNTER — TRANSCRIPTION ENCOUNTER (OUTPATIENT)
Age: 63
End: 2018-08-14

## 2018-08-14 ENCOUNTER — RX RENEWAL (OUTPATIENT)
Age: 63
End: 2018-08-14

## 2018-08-16 ENCOUNTER — APPOINTMENT (OUTPATIENT)
Dept: INTERNAL MEDICINE | Facility: CLINIC | Age: 63
End: 2018-08-16
Payer: COMMERCIAL

## 2018-08-16 VITALS
HEIGHT: 70 IN | SYSTOLIC BLOOD PRESSURE: 124 MMHG | WEIGHT: 230 LBS | OXYGEN SATURATION: 98 % | HEART RATE: 81 BPM | BODY MASS INDEX: 32.93 KG/M2 | DIASTOLIC BLOOD PRESSURE: 80 MMHG

## 2018-08-16 LAB — INR PPP: 2.9 RATIO

## 2018-08-16 PROCEDURE — 99213 OFFICE O/P EST LOW 20 MIN: CPT | Mod: 25

## 2018-08-16 PROCEDURE — 85610 PROTHROMBIN TIME: CPT | Mod: QW

## 2018-08-16 NOTE — ASSESSMENT
[FreeTextEntry1] :  Patient advised to continue present medications with diet and exercise. Patient will follow up with specialists as scheduled. Patient will return to the office in 1month fasting\par \par \par \par 24-hour urine was done 3/17\par specialists include..\par 1. cardiology-Dr. Eckert\par 2. ophthalmology-Dr.Goldberg-overdue " to do"\par 3. podiatry-NO LONGER GOES\par 4. apnea doctor-Dr. Live\par 5. gastro-"-Dr. Sanchez\par 6.-Dr. Michelle\par colonoscopy 3/18\par abdominal sonogram=6/17\par discussed shingles vaccine\par carotid sonogram was 11/16 with +plaque,no stenosis\par Echo was 12/16\par Chest x-ray was done 5/4/18 showing small left effusion=repeat 2/2018=clear

## 2018-09-11 ENCOUNTER — RX RENEWAL (OUTPATIENT)
Age: 63
End: 2018-09-11

## 2018-09-11 ENCOUNTER — TRANSCRIPTION ENCOUNTER (OUTPATIENT)
Age: 63
End: 2018-09-11

## 2018-09-14 LAB
BASOPHILS # BLD AUTO: 0.14 K/UL
BASOPHILS NFR BLD AUTO: 0.9 %
EOSINOPHIL # BLD AUTO: 0.14 K/UL
EOSINOPHIL NFR BLD AUTO: 0.9 %
HCT VFR BLD CALC: 37 %
HGB BLD-MCNC: 11 G/DL
LYMPHOCYTES # BLD AUTO: 2.43 K/UL
LYMPHOCYTES NFR BLD AUTO: 15.8 %
MAN DIFF?: NORMAL
MCHC RBC-ENTMCNC: 27.1 PG
MCHC RBC-ENTMCNC: 29.7 GM/DL
MCV RBC AUTO: 91.1 FL
MONOCYTES # BLD AUTO: 0.28 K/UL
MONOCYTES NFR BLD AUTO: 1.8 %
NEUTROPHILS # BLD AUTO: 12.13 K/UL
NEUTROPHILS NFR BLD AUTO: 78.9 %
PLATELET # BLD AUTO: 454 K/UL
RBC # BLD: 4.06 M/UL
RBC # FLD: 15.6 %
WBC # FLD AUTO: 15.37 K/UL

## 2018-09-18 ENCOUNTER — APPOINTMENT (OUTPATIENT)
Dept: INTERNAL MEDICINE | Facility: CLINIC | Age: 63
End: 2018-09-18
Payer: COMMERCIAL

## 2018-09-18 VITALS
HEART RATE: 86 BPM | OXYGEN SATURATION: 97 % | BODY MASS INDEX: 32.93 KG/M2 | HEIGHT: 70 IN | DIASTOLIC BLOOD PRESSURE: 80 MMHG | WEIGHT: 230 LBS | SYSTOLIC BLOOD PRESSURE: 125 MMHG

## 2018-09-18 LAB — INR PPP: 2.3 RATIO

## 2018-09-18 PROCEDURE — 36415 COLL VENOUS BLD VENIPUNCTURE: CPT

## 2018-09-18 PROCEDURE — 99214 OFFICE O/P EST MOD 30 MIN: CPT | Mod: 25

## 2018-09-18 PROCEDURE — 90686 IIV4 VACC NO PRSV 0.5 ML IM: CPT

## 2018-09-18 PROCEDURE — 85610 PROTHROMBIN TIME: CPT | Mod: QW

## 2018-09-18 PROCEDURE — G0008: CPT

## 2018-09-19 ENCOUNTER — RESULT REVIEW (OUTPATIENT)
Age: 63
End: 2018-09-19

## 2018-09-19 LAB
ALBUMIN SERPL ELPH-MCNC: 4.2 G/DL
ALP BLD-CCNC: 103 U/L
ALT SERPL-CCNC: 27 U/L
ANION GAP SERPL CALC-SCNC: 20 MMOL/L
AST SERPL-CCNC: 26 U/L
BASOPHILS # BLD AUTO: 0.03 K/UL
BASOPHILS NFR BLD AUTO: 0.4 %
BILIRUB SERPL-MCNC: 0.3 MG/DL
BUN SERPL-MCNC: 23 MG/DL
CALCIUM SERPL-MCNC: 9.6 MG/DL
CHLORIDE SERPL-SCNC: 98 MMOL/L
CHOLEST SERPL-MCNC: 149 MG/DL
CHOLEST/HDLC SERPL: 5 RATIO
CO2 SERPL-SCNC: 18 MMOL/L
CREAT SERPL-MCNC: 1.58 MG/DL
DIGOXIN SERPL-MCNC: <0.3 NG/ML
EOSINOPHIL # BLD AUTO: 0.09 K/UL
EOSINOPHIL NFR BLD AUTO: 1.2 %
GLUCOSE SERPL-MCNC: 443 MG/DL
HBA1C MFR BLD HPLC: 13.9 %
HCT VFR BLD CALC: 40.3 %
HDLC SERPL-MCNC: 30 MG/DL
HGB BLD-MCNC: 13.5 G/DL
IMM GRANULOCYTES NFR BLD AUTO: 0.9 %
LDLC SERPL CALC-MCNC: NORMAL
LYMPHOCYTES # BLD AUTO: 1.82 K/UL
LYMPHOCYTES NFR BLD AUTO: 24.3 %
MAGNESIUM SERPL-MCNC: 1.8 MG/DL
MAN DIFF?: NORMAL
MCHC RBC-ENTMCNC: 26.9 PG
MCHC RBC-ENTMCNC: 33.5 GM/DL
MCV RBC AUTO: 80.4 FL
MONOCYTES # BLD AUTO: 0.71 K/UL
MONOCYTES NFR BLD AUTO: 9.5 %
NEUTROPHILS # BLD AUTO: 4.76 K/UL
NEUTROPHILS NFR BLD AUTO: 63.7 %
PLATELET # BLD AUTO: 178 K/UL
POTASSIUM SERPL-SCNC: 4.8 MMOL/L
PROT SERPL-MCNC: 7.5 G/DL
RBC # BLD: 5.01 M/UL
RBC # FLD: 15.9 %
SODIUM SERPL-SCNC: 136 MMOL/L
TRIGL SERPL-MCNC: 423 MG/DL
TSH SERPL-ACNC: 2.49 UIU/ML
WBC # FLD AUTO: 7.48 K/UL

## 2018-09-19 NOTE — HISTORY OF PRESENT ILLNESS
[No Weight Changes] : No weight changes [None] : No weight lost attempts [Needs reinforcement, provided] : Patient needs reinforcement on understanding of disease, goals and obesity follow-up plan; reinforcement was provided [FreeTextEntry1] : Patient presents for INR check/follow up on hypertension. Patient is currently on Coumadin for A. fib and on Cartia/Toprol for hypertension.\par -Feels well without complaints\par -due for fasting labs\par  \par \par \par

## 2018-09-19 NOTE — ADDENDUM
[FreeTextEntry1] : Labs show\par -Type 2 diabetes is out of control-patient referred to endocrinology, appointment made for Dr. Nino 9/26

## 2018-09-20 ENCOUNTER — RX RENEWAL (OUTPATIENT)
Age: 63
End: 2018-09-20

## 2018-09-26 ENCOUNTER — APPOINTMENT (OUTPATIENT)
Dept: ENDOCRINOLOGY | Facility: CLINIC | Age: 63
End: 2018-09-26
Payer: COMMERCIAL

## 2018-09-26 VITALS
HEART RATE: 68 BPM | HEIGHT: 70 IN | DIASTOLIC BLOOD PRESSURE: 80 MMHG | BODY MASS INDEX: 32.93 KG/M2 | WEIGHT: 230 LBS | SYSTOLIC BLOOD PRESSURE: 120 MMHG

## 2018-09-26 LAB — GLUCOSE BLDC GLUCOMTR-MCNC: 461

## 2018-09-26 PROCEDURE — 82962 GLUCOSE BLOOD TEST: CPT

## 2018-09-26 PROCEDURE — 99204 OFFICE O/P NEW MOD 45 MIN: CPT | Mod: 25

## 2018-09-26 RX ORDER — NADOLOL 20 MG/1
20 TABLET ORAL
Qty: 60 | Refills: 0 | Status: DISCONTINUED | COMMUNITY
Start: 2018-04-20 | End: 2018-09-26

## 2018-09-26 RX ORDER — OXYCODONE AND ACETAMINOPHEN 5; 325 MG/1; MG/1
5-325 TABLET ORAL
Qty: 20 | Refills: 0 | Status: DISCONTINUED | COMMUNITY
Start: 2018-04-20 | End: 2018-09-26

## 2018-10-02 ENCOUNTER — OTHER (OUTPATIENT)
Age: 63
End: 2018-10-02

## 2018-10-03 ENCOUNTER — RX RENEWAL (OUTPATIENT)
Age: 63
End: 2018-10-03

## 2018-10-03 ENCOUNTER — APPOINTMENT (OUTPATIENT)
Dept: ENDOCRINOLOGY | Facility: CLINIC | Age: 63
End: 2018-10-03
Payer: COMMERCIAL

## 2018-10-03 PROCEDURE — G0108 DIAB MANAGE TRN  PER INDIV: CPT

## 2018-10-05 ENCOUNTER — RX RENEWAL (OUTPATIENT)
Age: 63
End: 2018-10-05

## 2018-10-17 ENCOUNTER — APPOINTMENT (OUTPATIENT)
Dept: INTERNAL MEDICINE | Facility: CLINIC | Age: 63
End: 2018-10-17
Payer: COMMERCIAL

## 2018-10-17 VITALS
DIASTOLIC BLOOD PRESSURE: 80 MMHG | SYSTOLIC BLOOD PRESSURE: 124 MMHG | HEIGHT: 70 IN | BODY MASS INDEX: 33.5 KG/M2 | WEIGHT: 234 LBS | HEART RATE: 96 BPM | OXYGEN SATURATION: 98 %

## 2018-10-17 LAB — INR PPP: 3.5 RATIO

## 2018-10-17 PROCEDURE — 99213 OFFICE O/P EST LOW 20 MIN: CPT | Mod: 25

## 2018-10-17 PROCEDURE — 85610 PROTHROMBIN TIME: CPT | Mod: QW

## 2018-10-17 NOTE — HISTORY OF PRESENT ILLNESS
[FreeTextEntry1] : Patient presents for INR check/follow up on hypertension. Patient is currently on Coumadin for A. fib and on Cartia/Toprol for hypertension.\par -Feels well without complaints\par -Seeing endocrinology for uncontrolled diabetes\par \par  \par \par \par

## 2018-10-17 NOTE — ASSESSMENT
[FreeTextEntry1] : Coumadin instructions given.Patient advised to continue present medications with diet and exercise. Patient will follow up with specialists as scheduled. Patient will return to the office in3 weeks/fasting in dec\par \par \par \par 24-hour urine was done 3/17\par specialists include..\par 1. cardiology-Dr. Eckert\par 2. ophthalmology-Dr.Goldberg-overdue " to do"\par 3. podiatry-NO LONGER GOES\par 4. apnea doctor-Dr. Live\par 5. gastro-"-Dr. Sanchez\par 6.-Dr. Michelle\par 7.Endocrine-Dr. Nino\par colonoscopy 3/18\par abdominal sonogram=6/17\par discussed shingles vaccine\par carotid sonogram was 11/16 with +plaque,no stenosis\par Echo was 12/16\par Chest x-ray was done 5/4/18 showing small left effusion=repeat 2/2018=clear\par Hep C screen has been done in the past

## 2018-10-25 ENCOUNTER — RX RENEWAL (OUTPATIENT)
Age: 63
End: 2018-10-25

## 2018-10-29 ENCOUNTER — APPOINTMENT (OUTPATIENT)
Dept: OPHTHALMOLOGY | Facility: CLINIC | Age: 63
End: 2018-10-29
Payer: COMMERCIAL

## 2018-10-29 DIAGNOSIS — H43.393 OTHER VITREOUS OPACITIES, BILATERAL: ICD-10-CM

## 2018-10-29 PROCEDURE — 92134 CPTRZ OPH DX IMG PST SGM RTA: CPT

## 2018-10-29 PROCEDURE — 92014 COMPRE OPH EXAM EST PT 1/>: CPT

## 2018-10-29 PROCEDURE — 92015 DETERMINE REFRACTIVE STATE: CPT

## 2018-11-05 ENCOUNTER — APPOINTMENT (OUTPATIENT)
Dept: PULMONOLOGY | Facility: CLINIC | Age: 63
End: 2018-11-05
Payer: COMMERCIAL

## 2018-11-05 ENCOUNTER — OTHER (OUTPATIENT)
Age: 63
End: 2018-11-05

## 2018-11-05 VITALS
OXYGEN SATURATION: 97 % | WEIGHT: 233 LBS | BODY MASS INDEX: 33.43 KG/M2 | DIASTOLIC BLOOD PRESSURE: 80 MMHG | SYSTOLIC BLOOD PRESSURE: 120 MMHG | HEART RATE: 67 BPM

## 2018-11-05 PROCEDURE — 99214 OFFICE O/P EST MOD 30 MIN: CPT

## 2018-11-06 ENCOUNTER — APPOINTMENT (OUTPATIENT)
Dept: INTERNAL MEDICINE | Facility: CLINIC | Age: 63
End: 2018-11-06
Payer: COMMERCIAL

## 2018-11-06 VITALS
HEART RATE: 88 BPM | OXYGEN SATURATION: 98 % | WEIGHT: 234 LBS | DIASTOLIC BLOOD PRESSURE: 80 MMHG | BODY MASS INDEX: 33.5 KG/M2 | SYSTOLIC BLOOD PRESSURE: 130 MMHG | HEIGHT: 70 IN

## 2018-11-06 PROCEDURE — 99213 OFFICE O/P EST LOW 20 MIN: CPT | Mod: 25

## 2018-11-06 PROCEDURE — 36415 COLL VENOUS BLD VENIPUNCTURE: CPT

## 2018-11-06 NOTE — ASSESSMENT
[FreeTextEntry1] : INR sent out.Patient advised to continue present medications with diet and exercise. Patient will follow up with specialists as scheduled. Patient will return to the office pending send out #/fasting in dec\par \par \par \par 24-hour urine was done 3/17\par specialists include..\par 1. cardiology-Dr. Eckert\par 2. ophthalmology-Dr.Goldberg-10/2018\par 3. podiatry-NO LONGER GOES\par 4. apnea doctor-Dr. Live\par 5. gastro-"-Dr. Sanchez\par 6.-Dr. Michelle\par 7.Endocrine-Dr. Nino\par colonoscopy 3/18\par abdominal sonogram=6/17\par discussed shingles vaccine\par carotid sonogram was 11/16 with +plaque,no stenosis\par Echo was 12/16\par Chest x-ray was done 5/4/18 showing small left effusion=repeat 2/2018=clear\par Hep C screen has been done in the past

## 2018-11-07 ENCOUNTER — RESULT REVIEW (OUTPATIENT)
Age: 63
End: 2018-11-07

## 2018-11-07 LAB
INR PPP: 2.63 RATIO
PT BLD: 30.9 SEC

## 2018-11-19 ENCOUNTER — APPOINTMENT (OUTPATIENT)
Dept: ENDOCRINOLOGY | Facility: CLINIC | Age: 63
End: 2018-11-19
Payer: COMMERCIAL

## 2018-11-19 PROCEDURE — G0108 DIAB MANAGE TRN  PER INDIV: CPT

## 2018-11-20 ENCOUNTER — APPOINTMENT (OUTPATIENT)
Dept: UROLOGY | Facility: CLINIC | Age: 63
End: 2018-11-20
Payer: COMMERCIAL

## 2018-11-20 ENCOUNTER — OUTPATIENT (OUTPATIENT)
Dept: OUTPATIENT SERVICES | Facility: HOSPITAL | Age: 63
LOS: 1 days | End: 2018-11-20
Payer: COMMERCIAL

## 2018-11-20 DIAGNOSIS — Z98.890 OTHER SPECIFIED POSTPROCEDURAL STATES: Chronic | ICD-10-CM

## 2018-11-20 DIAGNOSIS — R35.0 FREQUENCY OF MICTURITION: ICD-10-CM

## 2018-11-20 DIAGNOSIS — Z90.5 ACQUIRED ABSENCE OF KIDNEY: Chronic | ICD-10-CM

## 2018-11-20 DIAGNOSIS — N28.89 OTHER SPECIFIED DISORDERS OF KIDNEY AND URETER: ICD-10-CM

## 2018-11-20 PROCEDURE — 99214 OFFICE O/P EST MOD 30 MIN: CPT | Mod: 25

## 2018-11-20 PROCEDURE — 76775 US EXAM ABDO BACK WALL LIM: CPT | Mod: 26

## 2018-11-20 PROCEDURE — 76775 US EXAM ABDO BACK WALL LIM: CPT

## 2018-11-29 DIAGNOSIS — C64.2 MALIGNANT NEOPLASM OF LEFT KIDNEY, EXCEPT RENAL PELVIS: ICD-10-CM

## 2018-11-29 DIAGNOSIS — N28.89 OTHER SPECIFIED DISORDERS OF KIDNEY AND URETER: ICD-10-CM

## 2018-12-07 ENCOUNTER — APPOINTMENT (OUTPATIENT)
Dept: INTERNAL MEDICINE | Facility: CLINIC | Age: 63
End: 2018-12-07
Payer: COMMERCIAL

## 2018-12-07 VITALS
SYSTOLIC BLOOD PRESSURE: 124 MMHG | DIASTOLIC BLOOD PRESSURE: 80 MMHG | BODY MASS INDEX: 33.21 KG/M2 | WEIGHT: 232 LBS | HEART RATE: 88 BPM | OXYGEN SATURATION: 98 % | HEIGHT: 70 IN

## 2018-12-07 LAB — INR PPP: 2.6 RATIO

## 2018-12-07 PROCEDURE — 99214 OFFICE O/P EST MOD 30 MIN: CPT | Mod: 25

## 2018-12-07 PROCEDURE — 85610 PROTHROMBIN TIME: CPT | Mod: QW

## 2018-12-07 PROCEDURE — 36415 COLL VENOUS BLD VENIPUNCTURE: CPT

## 2018-12-07 NOTE — ASSESSMENT
[FreeTextEntry1] : Labs sent out.Patient advised to continue present medications with diet and exercise. Patient will follow up with specialists as scheduled.Coumadin instruction given. Patient will return to the office in 1 month\par \par \par 24-hour urine was done 3/17\par specialists include..\par 1. cardiology-Dr. Eckert\par 2. ophthalmology-Dr.Goldberg-10/2018\par 3. podiatry-NO LONGER GOES\par 4. apnea doctor-Dr. Live\par 5. gastro-"-Dr. Sanchez\par 6.-Dr. Michelle\par 7.Endocrine-Dr. Nino\par colonoscopy 3/18\par abdominal sonogram=6/17\par discussed shingles vaccine\par carotid sonogram was 11/16 with +plaque,no stenosis\par Echo was 12/16\par Chest x-ray was done 5/4/18 showing small left effusion=repeat 2/2018=clear\par Hep C screen has been done in the past

## 2018-12-07 NOTE — HISTORY OF PRESENT ILLNESS
[FreeTextEntry1] : Patient presents for INR check/follow up on hypertension. Patient is currently on Coumadin for A. fib and on Cartia/Toprol for hypertension.\par -Feels well without complaints\par -Seeing endocrinology for uncontrolled diabetes\par -due for fasting labs\par \par  \par \par \par

## 2018-12-10 ENCOUNTER — RX RENEWAL (OUTPATIENT)
Age: 63
End: 2018-12-10

## 2018-12-10 ENCOUNTER — TRANSCRIPTION ENCOUNTER (OUTPATIENT)
Age: 63
End: 2018-12-10

## 2018-12-10 ENCOUNTER — RESULT REVIEW (OUTPATIENT)
Age: 63
End: 2018-12-10

## 2018-12-10 LAB
ALBUMIN SERPL ELPH-MCNC: 4.3 G/DL
ALP BLD-CCNC: 94 U/L
ALT SERPL-CCNC: 39 U/L
ANION GAP SERPL CALC-SCNC: 12 MMOL/L
AST SERPL-CCNC: 32 U/L
BASOPHILS # BLD AUTO: 0.01 K/UL
BASOPHILS NFR BLD AUTO: 0.1 %
BILIRUB SERPL-MCNC: 0.4 MG/DL
BUN SERPL-MCNC: 24 MG/DL
CALCIUM SERPL-MCNC: 9.8 MG/DL
CHLORIDE SERPL-SCNC: 107 MMOL/L
CHOLEST SERPL-MCNC: 122 MG/DL
CHOLEST/HDLC SERPL: 3.7 RATIO
CO2 SERPL-SCNC: 21 MMOL/L
CREAT SERPL-MCNC: 1.52 MG/DL
DIGOXIN SERPL-MCNC: <0.3 NG/ML
EOSINOPHIL # BLD AUTO: 0.08 K/UL
EOSINOPHIL NFR BLD AUTO: 1.2 %
GLUCOSE SERPL-MCNC: 115 MG/DL
HBA1C MFR BLD HPLC: 7.1 %
HCT VFR BLD CALC: 42.5 %
HDLC SERPL-MCNC: 33 MG/DL
HGB BLD-MCNC: 14 G/DL
IMM GRANULOCYTES NFR BLD AUTO: 0.3 %
LDLC SERPL CALC-MCNC: 62 MG/DL
LYMPHOCYTES # BLD AUTO: 1.52 K/UL
LYMPHOCYTES NFR BLD AUTO: 22.5 %
MAGNESIUM SERPL-MCNC: 2 MG/DL
MAN DIFF?: NORMAL
MCHC RBC-ENTMCNC: 27.1 PG
MCHC RBC-ENTMCNC: 32.9 GM/DL
MCV RBC AUTO: 82.4 FL
MONOCYTES # BLD AUTO: 0.89 K/UL
MONOCYTES NFR BLD AUTO: 13.2 %
NEUTROPHILS # BLD AUTO: 4.24 K/UL
NEUTROPHILS NFR BLD AUTO: 62.7 %
PLATELET # BLD AUTO: 226 K/UL
POTASSIUM SERPL-SCNC: 5.1 MMOL/L
PROT SERPL-MCNC: 7.7 G/DL
RBC # BLD: 5.16 M/UL
RBC # FLD: 14.5 %
SODIUM SERPL-SCNC: 140 MMOL/L
TRIGL SERPL-MCNC: 133 MG/DL
TSH SERPL-ACNC: 2.25 UIU/ML
WBC # FLD AUTO: 6.76 K/UL

## 2019-01-03 ENCOUNTER — APPOINTMENT (OUTPATIENT)
Dept: ENDOCRINOLOGY | Facility: CLINIC | Age: 64
End: 2019-01-03
Payer: COMMERCIAL

## 2019-01-03 VITALS
BODY MASS INDEX: 33.36 KG/M2 | HEART RATE: 83 BPM | HEIGHT: 70 IN | DIASTOLIC BLOOD PRESSURE: 90 MMHG | WEIGHT: 233 LBS | SYSTOLIC BLOOD PRESSURE: 130 MMHG

## 2019-01-03 PROCEDURE — 99214 OFFICE O/P EST MOD 30 MIN: CPT

## 2019-01-03 NOTE — ASSESSMENT
[Carbohydrate Consistent Diet] : carbohydrate consistent diet [Long Term Vascular Complications] : long term vascular complications of diabetes [Importance of Diet and Exercise] : importance of diet and exercise to improve glycemic control, achieve weight loss and improve cardiovascular health [Self Monitoring of Blood Glucose] : self monitoring of blood glucose [Insulin Self-Administration] : insulin self-administration [FreeTextEntry1] : DM2 much better controlled a1c 7.1 \par continue for now glipizide XL 10 mg BID \par lower tresiba to 18 u Hs due to hypog in am. \par discussed diet and exercise\par encouraged more exercise walking 30 min 3 x week\par CDE for diet education\par eye exam Nov 2018: reportedly no DR \par Bgs 3x day \par he wants to come off insulin but not sure if able to. metformin a little rleuatcant since CKD.\par he might want to loose some weight and lower insulin dose and consider glp1 . consider SGLt2 for hyperG postprandial.\par \par HTN:  bp at target on meds. Continue current management. \par \par HLD: TG down to 133 since Dm corrected \par continue crestor 5 mg qd. \par \par obesity:  \par encouraged more exercise walking 30 min 3 x week\par discussed diet and exercise\par \par

## 2019-01-03 NOTE — PHYSICAL EXAM
[Alert] : alert [No Acute Distress] : no acute distress [Well Nourished] : well nourished [Well Developed] : well developed [Normal Sclera/Conjunctiva] : normal sclera/conjunctiva [EOMI] : extra ocular movement intact [No Proptosis] : no proptosis [Normal Oropharynx] : the oropharynx was normal [Thyroid Not Enlarged] : the thyroid was not enlarged [No Thyroid Nodules] : there were no palpable thyroid nodules [No Respiratory Distress] : no respiratory distress [No Accessory Muscle Use] : no accessory muscle use [Clear to Auscultation] : lungs were clear to auscultation bilaterally [Normal Rate] : heart rate was normal  [Normal S1, S2] : normal S1 and S2 [Regular Rhythm] : with a regular rhythm [Pedal Pulses Normal] : the pedal pulses are present [No Edema] : there was no peripheral edema [Normal Bowel Sounds] : normal bowel sounds [Not Tender] : non-tender [Soft] : abdomen soft [Not Distended] : not distended [Post Cervical Nodes] : posterior cervical nodes [Anterior Cervical Nodes] : anterior cervical nodes [Normal] : normal and non tender [Spine Straight] : spine straight [No Stigmata of Cushings Syndrome] : no stigmata of cushings syndrome [Normal Gait] : normal gait [Normal Strength/Tone] : muscle strength and tone were normal [No Rash] : no rash [Normal Reflexes] : deep tendon reflexes were 2+ and symmetric [No Tremors] : no tremors [Oriented x3] : oriented to person, place, and time [Acanthosis Nigricans] : no acanthosis nigricans [de-identified] : necrobiosis lipoidica both calves

## 2019-01-03 NOTE — HISTORY OF PRESENT ILLNESS
[FreeTextEntry1] : DM2 . HTN, HLD. 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 \par He just had partial nephrectomy for renal carcinoma. \par He takes glipizide 10 mg BID + onglyza 5 mg qd. \par Takes tresiba HS \par Much better diet , low in carbs. \par Bgs rarely above 130 except holidays.  \par

## 2019-01-04 ENCOUNTER — MEDICATION RENEWAL (OUTPATIENT)
Age: 64
End: 2019-01-04

## 2019-01-25 ENCOUNTER — RESULT CHARGE (OUTPATIENT)
Age: 64
End: 2019-01-25

## 2019-01-25 ENCOUNTER — APPOINTMENT (OUTPATIENT)
Dept: INTERNAL MEDICINE | Facility: CLINIC | Age: 64
End: 2019-01-25
Payer: COMMERCIAL

## 2019-01-25 VITALS
HEART RATE: 80 BPM | WEIGHT: 230 LBS | SYSTOLIC BLOOD PRESSURE: 130 MMHG | OXYGEN SATURATION: 97 % | DIASTOLIC BLOOD PRESSURE: 80 MMHG | BODY MASS INDEX: 32.93 KG/M2 | HEIGHT: 70 IN

## 2019-01-25 LAB — INR PPP: 1.9 RATIO

## 2019-01-25 PROCEDURE — 85610 PROTHROMBIN TIME: CPT | Mod: QW

## 2019-01-25 PROCEDURE — 99214 OFFICE O/P EST MOD 30 MIN: CPT | Mod: 25

## 2019-01-25 RX ORDER — AMOXICILLIN 875 MG/1
875 TABLET, FILM COATED ORAL
Qty: 14 | Refills: 0 | Status: DISCONTINUED | COMMUNITY
Start: 2018-12-18 | End: 2019-01-25

## 2019-01-28 LAB
APPEARANCE: CLEAR
BACTERIA: NEGATIVE
BILIRUBIN URINE: NEGATIVE
BLOOD URINE: NEGATIVE
COLOR: YELLOW
GLUCOSE QUALITATIVE U: NEGATIVE MG/DL
HYALINE CASTS: 1 /LPF
KETONES URINE: NEGATIVE
LEUKOCYTE ESTERASE URINE: NEGATIVE
MICROSCOPIC-UA: NORMAL
NITRITE URINE: NEGATIVE
PH URINE: 5.5
PROTEIN URINE: 100 MG/DL
RED BLOOD CELLS URINE: 7 /HPF
SPECIFIC GRAVITY URINE: 1.02
SQUAMOUS EPITHELIAL CELLS: 1 /HPF
UROBILINOGEN URINE: NEGATIVE MG/DL
WHITE BLOOD CELLS URINE: 1 /HPF

## 2019-01-28 NOTE — ADDENDUM
[FreeTextEntry1] : UA shows\par -Protein-to do 24-hour\par -Rbc's-to followup with Dr. Michelle\par \par Urine cytology is normal

## 2019-01-28 NOTE — HISTORY OF PRESENT ILLNESS
[FreeTextEntry1] : Patient presents for INR check/follow up on hypertension. Patient is currently on Coumadin for A. fib and on Cartia/Toprol for hypertension.\par -Patient states about 2 weeks ago he had one day where he noticed blood in his urine, was asymptomatic and has not seen any since but figured he would mention with history of RCC\par -Endocrinology decreased Lantus to 18 units daily\par -Patient states he hit his head on a light switch yesterday and sustained laceration, no LOC\par \par \par \par  \par \par \par

## 2019-01-28 NOTE — ASSESSMENT
[FreeTextEntry1] : Urine sent out.Pt told he needs CT brain with having head trauma on Coumadin but he currently declines despite risks, will report any symptoms.Patient advised to continue present medications with diet and exercise. Patient will follow up with specialists as scheduled.Coumadin instruction given. Patient will return to the office in 3 weeks/fasting in march with CPE in june\par \par \par 24-hour urine was done 3/17-container given to recheck\par specialists include..\par 1. cardiology-Dr. Eckert\par 2. ophthalmology-Dr.Goldberg-10/2018\par 3. podiatry-NO LONGER GOES\par 4. apnea doctor-Dr. Live\par 5. gastro-"-Dr. Sanchez\par 6.-Dr. Michelle\par 7.Endocrine-Dr. Nino\par colonoscopy 3/18\par abdominal sonogram=6/17\par discussed shingles vaccine\par carotid sonogram was 11/16 with +plaque,no stenosis (told pt to check with cardio regarding follow up sono)\par Echo was 12/16-to s/w cardio about follow up\par Chest x-ray was done 5/4/18 showing small left effusion=repeat 2/2018=clear\par Hep C screen has been done in the past

## 2019-01-28 NOTE — PHYSICAL EXAM
[General Appearance - In No Acute Distress] : in no acute distress [] : no respiratory distress [Respiration, Rhythm And Depth] : normal respiratory rhythm and effort [Auscultation Breath Sounds / Voice Sounds] : lungs were clear to auscultation bilaterally [Affect] : the affect was normal [Mood] : the mood was normal [Soft] : abdomen soft [Non Tender] : non-tender [No CVA Tenderness] : no CVA  tenderness [No Focal Deficits] : no focal deficits [de-identified] : +small lac left scalp, no active bleeding [FreeTextEntry1] : + irreg with CVR

## 2019-01-30 ENCOUNTER — TRANSCRIPTION ENCOUNTER (OUTPATIENT)
Age: 64
End: 2019-01-30

## 2019-01-30 ENCOUNTER — RESULT REVIEW (OUTPATIENT)
Age: 64
End: 2019-01-30

## 2019-02-01 ENCOUNTER — RESULT REVIEW (OUTPATIENT)
Age: 64
End: 2019-02-01

## 2019-02-01 ENCOUNTER — TRANSCRIPTION ENCOUNTER (OUTPATIENT)
Age: 64
End: 2019-02-01

## 2019-02-01 LAB
BSA DERIVED: 1.73 M2
CREAT 24H UR-MCNC: 2 G/24 H
CREAT 24H UR-MCNC: 2 G/24 H
CREAT ?TM UR-MCNC: 69 MG/DL
CREAT ?TM UR-MCNC: 69 MG/DL
CREAT CL 24H UR+SERPL-VRATE: 81 ML/MIN
CREAT SERPL-MCNC: 1.69 MG/DL
PROT 24H UR-MRATE: 29 MG/DL
PROT ?TM UR-MCNC: 24 HR
PROT ?TM UR-MCNC: 24 HR
PROT UR-MCNC: 826 MG/24 H
SPECIMEN VOL 24H UR: 2850 ML
SPECIMEN VOL 24H UR: 2850 ML

## 2019-02-25 ENCOUNTER — APPOINTMENT (OUTPATIENT)
Dept: INTERNAL MEDICINE | Facility: CLINIC | Age: 64
End: 2019-02-25
Payer: COMMERCIAL

## 2019-02-25 ENCOUNTER — MESSAGE (OUTPATIENT)
Age: 64
End: 2019-02-25

## 2019-02-25 VITALS
HEIGHT: 70 IN | HEART RATE: 89 BPM | SYSTOLIC BLOOD PRESSURE: 134 MMHG | WEIGHT: 232 LBS | DIASTOLIC BLOOD PRESSURE: 80 MMHG | OXYGEN SATURATION: 98 % | BODY MASS INDEX: 33.21 KG/M2

## 2019-02-25 LAB — INR PPP: 2.9 RATIO

## 2019-02-25 PROCEDURE — 85610 PROTHROMBIN TIME: CPT | Mod: QW

## 2019-02-25 PROCEDURE — 99213 OFFICE O/P EST LOW 20 MIN: CPT | Mod: 25

## 2019-02-25 NOTE — HISTORY OF PRESENT ILLNESS
[FreeTextEntry1] : Patient presents for INR check/follow up on hypertension. Patient is currently on Coumadin for A. fib and on Cartia/Toprol for hypertension.\par -no complaints\par -to call  as last UA showed RBCs, cytology was negative\par - to see nephrology for significant proteinuria with 24-hour urine done last month\par \par \par  \par \par \par

## 2019-02-25 NOTE — ASSESSMENT
[FreeTextEntry1] : Patient advised to continue present medications with diet and exercise. Patient will follow up with specialists as scheduled.Coumadin instruction given. Patient will return to the office in 3-4 weeks/fasting in march with CPE in june\par \par \par 24-hour urine was done 1/2019-sent to renal-sched to see Dr. Gong this week\par specialists include..\par 1. cardiology-Dr. Eckert\par 2. ophthalmology-Dr.Goldberg-10/2018\par 3. podiatry-NO LONGER GOES\par 4. apnea doctor-Dr. Live\par 5. gastro-"-Dr. Sanchez\par 6.-Dr. Michelle\par 7.Endocrine-Dr. Nino\par 8.renal-Dr. Gong\par colonoscopy 3/18\par abdominal sonogram=6/17\par discussed shingles vaccine\par carotid sonogram was 11/16 with +plaque,no stenosis (told pt to check with cardio regarding follow up sono)\par Echo was 12/16-to s/w cardio about follow up\par Chest x-ray was done 5/4/18 showing small left effusion=repeat 2/2018=clear\par Hep C screen has been done in the past

## 2019-02-27 ENCOUNTER — APPOINTMENT (OUTPATIENT)
Dept: NEPHROLOGY | Facility: CLINIC | Age: 64
End: 2019-02-27
Payer: COMMERCIAL

## 2019-02-27 VITALS
DIASTOLIC BLOOD PRESSURE: 80 MMHG | SYSTOLIC BLOOD PRESSURE: 140 MMHG | HEIGHT: 70 IN | HEART RATE: 93 BPM | OXYGEN SATURATION: 97 % | BODY MASS INDEX: 32.64 KG/M2 | WEIGHT: 228 LBS

## 2019-02-27 DIAGNOSIS — D12.5 BENIGN NEOPLASM OF SIGMOID COLON: ICD-10-CM

## 2019-02-27 DIAGNOSIS — H25.013 CORTICAL AGE-RELATED CATARACT, BILATERAL: ICD-10-CM

## 2019-02-27 DIAGNOSIS — M51.36 OTHER INTERVERTEBRAL DISC DEGENERATION, LUMBAR REGION: ICD-10-CM

## 2019-02-27 PROCEDURE — 99205 OFFICE O/P NEW HI 60 MIN: CPT | Mod: 25

## 2019-02-27 PROCEDURE — 36415 COLL VENOUS BLD VENIPUNCTURE: CPT

## 2019-02-27 RX ORDER — BLOOD SUGAR DIAGNOSTIC
STRIP MISCELLANEOUS
Qty: 400 | Refills: 0 | Status: DISCONTINUED | COMMUNITY
End: 2019-02-27

## 2019-02-27 RX ORDER — BLOOD-GLUCOSE METER
W/DEVICE KIT MISCELLANEOUS
Qty: 1 | Refills: 0 | Status: DISCONTINUED | COMMUNITY
End: 2019-02-27

## 2019-02-27 RX ORDER — BLOOD SUGAR DIAGNOSTIC
STRIP MISCELLANEOUS
Qty: 200 | Refills: 1 | Status: DISCONTINUED | COMMUNITY
Start: 2018-05-18 | End: 2019-02-27

## 2019-02-27 NOTE — PHYSICAL EXAM
[General Appearance - Alert] : alert [General Appearance - In No Acute Distress] : in no acute distress [General Appearance - Well Nourished] : well nourished [General Appearance - Well Developed] : well developed [General Appearance - Well-Appearing] : healthy appearing [Sclera] : the sclera and conjunctiva were normal [PERRL With Normal Accommodation] : pupils were equal in size, round, and reactive to light [Extraocular Movements] : extraocular movements were intact [Strabismus] : no strabismus was seen [Outer Ear] : the ears and nose were normal in appearance [Hearing Threshold Finger Rub Not Nelson] : hearing was normal [Examination Of The Oral Cavity] : the lips and gums were normal [Both Tympanic Membranes Were Examined] : both tympanic membranes were normal [Nasal Cavity] : the nasal mucosa and septum were normal [Oropharynx] : the oropharynx was normal [Neck Appearance] : the appearance of the neck was normal [Neck Cervical Mass (___cm)] : no neck mass was observed [Jugular Venous Distention Increased] : there was no jugular-venous distention [Thyroid Diffuse Enlargement] : the thyroid was not enlarged [Thyroid Nodule] : there were no palpable thyroid nodules [Exaggerated Use Of Accessory Muscles For Inspiration] : no accessory muscle use [Auscultation Breath Sounds / Voice Sounds] : lungs were clear to auscultation bilaterally [Chest Palpation] : palpation of the chest revealed no abnormalities [Lungs Percussion] : the lungs were normal to percussion [Heart Rate And Rhythm] : heart rate was normal and rhythm regular [Heart Sounds] : normal S1 and S2 [Heart Sounds Gallop] : no gallops [Murmurs] : no murmurs [Heart Sounds Pericardial Friction Rub] : no pericardial rub [FreeTextEntry1] : AF, [Full Pulse] : the pedal pulses are present [Edema] : there was no peripheral edema [Bowel Sounds] : normal bowel sounds [Abdomen Soft] : soft [Abdomen Tenderness] : non-tender [Abdomen Mass (___ Cm)] : no abdominal mass palpated [Cervical Lymph Nodes Enlarged Posterior Bilaterally] : posterior cervical [Cervical Lymph Nodes Enlarged Anterior Bilaterally] : anterior cervical [Supraclavicular Lymph Nodes Enlarged Bilaterally] : supraclavicular [Axillary Lymph Nodes Enlarged Bilaterally] : axillary [Femoral Lymph Nodes Enlarged Bilaterally] : femoral [Inguinal Lymph Nodes Enlarged Bilaterally] : inguinal [No CVA Tenderness] : no ~M costovertebral angle tenderness [No Spinal Tenderness] : no spinal tenderness [Abnormal Walk] : normal gait [Nail Clubbing] : no clubbing  or cyanosis of the fingernails [Musculoskeletal - Swelling] : no joint swelling seen [Motor Tone] : muscle strength and tone were normal [Skin Color & Pigmentation] : normal skin color and pigmentation [Skin Turgor] : normal skin turgor [] : no rash [Deep Tendon Reflexes (DTR)] : deep tendon reflexes were 2+ and symmetric [Sensation] : the sensory exam was normal to light touch and pinprick [No Focal Deficits] : no focal deficits [Oriented To Time, Place, And Person] : oriented to person, place, and time [Impaired Insight] : insight and judgment were intact [Affect] : the affect was normal

## 2019-02-27 NOTE — HISTORY OF PRESENT ILLNESS
[FreeTextEntry1] : REF : Proteinuria,\par \par DM, X 10 years, S/P Partial L - Nephrectomy ( RCC )  F/U CT - P :\par \par Non Ischemic Cardiomyopathy, HTN,

## 2019-02-27 NOTE — ASSESSMENT
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

## 2019-02-28 ENCOUNTER — FORM ENCOUNTER (OUTPATIENT)
Age: 64
End: 2019-02-28

## 2019-02-28 LAB
25(OH)D3 SERPL-MCNC: 37.6 NG/ML
ALBUMIN SERPL ELPH-MCNC: 4.4 G/DL
ANION GAP SERPL CALC-SCNC: 17 MMOL/L
BUN SERPL-MCNC: 27 MG/DL
CALCIUM SERPL-MCNC: 9.7 MG/DL
CALCIUM SERPL-MCNC: 9.7 MG/DL
CHLORIDE SERPL-SCNC: 106 MMOL/L
CO2 SERPL-SCNC: 19 MMOL/L
CREAT SERPL-MCNC: 1.49 MG/DL
CREAT SPEC-SCNC: 107 MG/DL
CREAT/PROT UR: 0.4 RATIO
GLUCOSE SERPL-MCNC: 197 MG/DL
HBA1C MFR BLD HPLC: 6.4 %
PARATHYROID HORMONE INTACT: 38 PG/ML
PHOSPHATE SERPL-MCNC: 3.7 MG/DL
POTASSIUM SERPL-SCNC: 5 MMOL/L
PROT UR-MCNC: 44 MG/DL
SODIUM SERPL-SCNC: 142 MMOL/L

## 2019-03-01 ENCOUNTER — APPOINTMENT (OUTPATIENT)
Dept: CT IMAGING | Facility: CLINIC | Age: 64
End: 2019-03-01

## 2019-03-01 ENCOUNTER — OUTPATIENT (OUTPATIENT)
Dept: OUTPATIENT SERVICES | Facility: HOSPITAL | Age: 64
LOS: 1 days | End: 2019-03-01
Payer: COMMERCIAL

## 2019-03-01 DIAGNOSIS — Z00.8 ENCOUNTER FOR OTHER GENERAL EXAMINATION: ICD-10-CM

## 2019-03-01 DIAGNOSIS — Z98.890 OTHER SPECIFIED POSTPROCEDURAL STATES: Chronic | ICD-10-CM

## 2019-03-01 DIAGNOSIS — C64.2 MALIGNANT NEOPLASM OF LEFT KIDNEY, EXCEPT RENAL PELVIS: ICD-10-CM

## 2019-03-01 DIAGNOSIS — Z90.5 ACQUIRED ABSENCE OF KIDNEY: Chronic | ICD-10-CM

## 2019-03-01 PROCEDURE — 82565 ASSAY OF CREATININE: CPT

## 2019-03-01 PROCEDURE — 74178 CT ABD&PLV WO CNTR FLWD CNTR: CPT | Mod: 26

## 2019-03-01 PROCEDURE — 74178 CT ABD&PLV WO CNTR FLWD CNTR: CPT

## 2019-03-02 ENCOUNTER — TRANSCRIPTION ENCOUNTER (OUTPATIENT)
Age: 64
End: 2019-03-02

## 2019-03-06 ENCOUNTER — OTHER (OUTPATIENT)
Age: 64
End: 2019-03-06

## 2019-03-06 ENCOUNTER — FORM ENCOUNTER (OUTPATIENT)
Age: 64
End: 2019-03-06

## 2019-03-07 ENCOUNTER — APPOINTMENT (OUTPATIENT)
Dept: MRI IMAGING | Facility: CLINIC | Age: 64
End: 2019-03-07
Payer: COMMERCIAL

## 2019-03-07 ENCOUNTER — OUTPATIENT (OUTPATIENT)
Dept: OUTPATIENT SERVICES | Facility: HOSPITAL | Age: 64
LOS: 1 days | End: 2019-03-07
Payer: COMMERCIAL

## 2019-03-07 DIAGNOSIS — Z90.5 ACQUIRED ABSENCE OF KIDNEY: Chronic | ICD-10-CM

## 2019-03-07 DIAGNOSIS — Z98.890 OTHER SPECIFIED POSTPROCEDURAL STATES: Chronic | ICD-10-CM

## 2019-03-07 DIAGNOSIS — Z00.00 ENCOUNTER FOR GENERAL ADULT MEDICAL EXAMINATION WITHOUT ABNORMAL FINDINGS: ICD-10-CM

## 2019-03-07 PROCEDURE — 74183 MRI ABD W/O CNTR FLWD CNTR: CPT | Mod: 26

## 2019-03-07 PROCEDURE — A9585: CPT

## 2019-03-07 PROCEDURE — 74183 MRI ABD W/O CNTR FLWD CNTR: CPT

## 2019-03-08 ENCOUNTER — APPOINTMENT (OUTPATIENT)
Dept: UROLOGY | Facility: CLINIC | Age: 64
End: 2019-03-08
Payer: COMMERCIAL

## 2019-03-08 PROCEDURE — 99213 OFFICE O/P EST LOW 20 MIN: CPT

## 2019-03-08 NOTE — ASSESSMENT
[FreeTextEntry1] : 62 y/o M h/o RCC s/p L lap PNx here for follow up\par - discussed management options of this indeterminate region, pt is quite anxious and fears disease progression, they desire exptirpation\par - will need cysto at the same time given his hematuria, likely to  be from prostatic bleeding given the coumadin\par - will book after daughter's wedding in the fall

## 2019-03-08 NOTE — HISTORY OF PRESENT ILLNESS
[FreeTextEntry1] : 64 y/o M s/p L lap PNx ccRCC FG2 pT1a 2018 here for follow up after having an episode of gross hematuria. pt notes he had 1 episode of painless gross hematuria. Had a MRI for w/u and showed a small 9mm indeterminate lesion of the R kidney. His hematuria has resolved, he follows a nephrologist for non-nephrotic proteinuria. He is anxious about the lesion and want this removed.\par \par Denies F/C, N/V, CP, SOB, abd pain, urinary frequency, urinary urgency, dysuria.

## 2019-03-08 NOTE — PHYSICAL EXAM
[General Appearance - Well Developed] : well developed [General Appearance - Well Nourished] : well nourished [] : no respiratory distress [Exaggerated Use Of Accessory Muscles For Inspiration] : no accessory muscle use [Normal Station and Gait] : the gait and station were normal for the patient's age [No Focal Deficits] : no focal deficits [FreeTextEntry1] : no JVD, irregularly irregular rhythm

## 2019-03-11 ENCOUNTER — RX RENEWAL (OUTPATIENT)
Age: 64
End: 2019-03-11

## 2019-03-13 ENCOUNTER — OUTPATIENT (OUTPATIENT)
Dept: OUTPATIENT SERVICES | Facility: HOSPITAL | Age: 64
LOS: 1 days | End: 2019-03-13
Payer: COMMERCIAL

## 2019-03-13 VITALS
HEIGHT: 68.5 IN | DIASTOLIC BLOOD PRESSURE: 88 MMHG | HEART RATE: 86 BPM | TEMPERATURE: 98 F | RESPIRATION RATE: 16 BRPM | WEIGHT: 229.06 LBS | SYSTOLIC BLOOD PRESSURE: 130 MMHG | OXYGEN SATURATION: 98 %

## 2019-03-13 DIAGNOSIS — Z90.5 ACQUIRED ABSENCE OF KIDNEY: Chronic | ICD-10-CM

## 2019-03-13 DIAGNOSIS — E11.9 TYPE 2 DIABETES MELLITUS WITHOUT COMPLICATIONS: ICD-10-CM

## 2019-03-13 DIAGNOSIS — I48.91 UNSPECIFIED ATRIAL FIBRILLATION: ICD-10-CM

## 2019-03-13 DIAGNOSIS — Z98.890 OTHER SPECIFIED POSTPROCEDURAL STATES: Chronic | ICD-10-CM

## 2019-03-13 DIAGNOSIS — N28.89 OTHER SPECIFIED DISORDERS OF KIDNEY AND URETER: ICD-10-CM

## 2019-03-13 DIAGNOSIS — C64.2 MALIGNANT NEOPLASM OF LEFT KIDNEY, EXCEPT RENAL PELVIS: ICD-10-CM

## 2019-03-13 DIAGNOSIS — I48.91 UNSPECIFIED ATRIAL FIBRILLATION: Chronic | ICD-10-CM

## 2019-03-13 DIAGNOSIS — G47.30 SLEEP APNEA, UNSPECIFIED: ICD-10-CM

## 2019-03-13 LAB
ANION GAP SERPL CALC-SCNC: 12 MMO/L — SIGNIFICANT CHANGE UP (ref 7–14)
APPEARANCE UR: CLEAR — SIGNIFICANT CHANGE UP
BACTERIA # UR AUTO: NEGATIVE — SIGNIFICANT CHANGE UP
BILIRUB UR-MCNC: NEGATIVE — SIGNIFICANT CHANGE UP
BLD GP AB SCN SERPL QL: NEGATIVE — SIGNIFICANT CHANGE UP
BLOOD UR QL VISUAL: NEGATIVE — SIGNIFICANT CHANGE UP
BUN SERPL-MCNC: 29 MG/DL — HIGH (ref 7–23)
CALCIUM SERPL-MCNC: 10 MG/DL — SIGNIFICANT CHANGE UP (ref 8.4–10.5)
CHLORIDE SERPL-SCNC: 106 MMOL/L — SIGNIFICANT CHANGE UP (ref 98–107)
CO2 SERPL-SCNC: 23 MMOL/L — SIGNIFICANT CHANGE UP (ref 22–31)
COLOR SPEC: SIGNIFICANT CHANGE UP
CREAT SERPL-MCNC: 1.51 MG/DL — HIGH (ref 0.5–1.3)
GLUCOSE SERPL-MCNC: 151 MG/DL — HIGH (ref 70–99)
GLUCOSE UR-MCNC: NEGATIVE — SIGNIFICANT CHANGE UP
HBA1C BLD-MCNC: 6.3 % — HIGH (ref 4–5.6)
HCT VFR BLD CALC: 43 % — SIGNIFICANT CHANGE UP (ref 39–50)
HGB BLD-MCNC: 13.6 G/DL — SIGNIFICANT CHANGE UP (ref 13–17)
HYALINE CASTS # UR AUTO: NEGATIVE — SIGNIFICANT CHANGE UP
KETONES UR-MCNC: NEGATIVE — SIGNIFICANT CHANGE UP
LEUKOCYTE ESTERASE UR-ACNC: NEGATIVE — SIGNIFICANT CHANGE UP
MCHC RBC-ENTMCNC: 26.5 PG — LOW (ref 27–34)
MCHC RBC-ENTMCNC: 31.6 % — LOW (ref 32–36)
MCV RBC AUTO: 83.8 FL — SIGNIFICANT CHANGE UP (ref 80–100)
NITRITE UR-MCNC: NEGATIVE — SIGNIFICANT CHANGE UP
NRBC # FLD: 0 K/UL — LOW (ref 25–125)
PH UR: 6 — SIGNIFICANT CHANGE UP (ref 5–8)
PLATELET # BLD AUTO: 209 K/UL — SIGNIFICANT CHANGE UP (ref 150–400)
PMV BLD: 10 FL — SIGNIFICANT CHANGE UP (ref 7–13)
POTASSIUM SERPL-MCNC: 4.3 MMOL/L — SIGNIFICANT CHANGE UP (ref 3.5–5.3)
POTASSIUM SERPL-SCNC: 4.3 MMOL/L — SIGNIFICANT CHANGE UP (ref 3.5–5.3)
PROT UR-MCNC: 20 — SIGNIFICANT CHANGE UP
RBC # BLD: 5.13 M/UL — SIGNIFICANT CHANGE UP (ref 4.2–5.8)
RBC # FLD: 14.6 % — HIGH (ref 10.3–14.5)
RBC CASTS # UR COMP ASSIST: SIGNIFICANT CHANGE UP (ref 0–?)
RH IG SCN BLD-IMP: POSITIVE — SIGNIFICANT CHANGE UP
SODIUM SERPL-SCNC: 141 MMOL/L — SIGNIFICANT CHANGE UP (ref 135–145)
SP GR SPEC: 1.02 — SIGNIFICANT CHANGE UP (ref 1–1.04)
SQUAMOUS # UR AUTO: SIGNIFICANT CHANGE UP
UROBILINOGEN FLD QL: NORMAL — SIGNIFICANT CHANGE UP
WBC # BLD: 6.93 K/UL — SIGNIFICANT CHANGE UP (ref 3.8–10.5)
WBC # FLD AUTO: 6.93 K/UL — SIGNIFICANT CHANGE UP (ref 3.8–10.5)
WBC UR QL: SIGNIFICANT CHANGE UP (ref 0–?)

## 2019-03-13 PROCEDURE — 93010 ELECTROCARDIOGRAM REPORT: CPT

## 2019-03-13 RX ORDER — ENOXAPARIN SODIUM 100 MG/ML
100 INJECTION SUBCUTANEOUS
Qty: 0 | Refills: 0 | COMMUNITY

## 2019-03-13 RX ORDER — SENNA PLUS 8.6 MG/1
2 TABLET ORAL
Qty: 0 | Refills: 0 | COMMUNITY

## 2019-03-13 RX ORDER — SODIUM CHLORIDE 9 MG/ML
1000 INJECTION, SOLUTION INTRAVENOUS
Qty: 0 | Refills: 0 | Status: DISCONTINUED | OUTPATIENT
Start: 2019-04-01 | End: 2019-04-02

## 2019-03-13 NOTE — H&P PST ADULT - NSICDXPROBLEM_GEN_ALL_CORE_FT
PROBLEM DIAGNOSES  Problem: Diabetes  Assessment and Plan: Await endocrine evaluation with endocrinologist due to diabete and recent changes being made at this date to his insulin doses  * Await old ekg for comparison, echo and stress test from cardiologist  * Instructed to stop metformin 24 hours before surgery  * Instructed to not take glipizide the night before surgery or the am of surgery  * To take Lantus 12.8 units the night before surgery    Problem: Sleep apnea  Assessment and Plan: Notified OR booking via fax of sleep apnea  * Await sleep study from pulmonologist    Problem: Kidney mass  Assessment and Plan: This is a  62 y/o male who is scheduled for right lap partial nephrectomy, possible ope, possible radical, cystoscopy on 4-1-19  * Given pre op instructions and scrub cleanser PROBLEM DIAGNOSES  Problem: Atrial fibrillation  Assessment and Plan: Await medical evaluation with primary. PCP to determine when or if to stop coumadin. Patient states that MD will bridge patient with lovenox and to see pcp on 3-19-19    Problem: Diabetes  Assessment and Plan: Await endocrine evaluation with endocrinologist due to diabete and recent changes being made at this date to his insulin doses  * Await old ekg for comparison, echo and stress test from cardiologist  * Instructed to stop metformin 24 hours before surgery  * Instructed to not take glipizide the night before surgery or the am of surgery  * To take Lantus 12.8 units the night before surgery    Problem: Sleep apnea  Assessment and Plan: Notified OR booking via fax of sleep apnea  * Await sleep study from pulmonologist    Problem: Kidney mass  Assessment and Plan: This is a  62 y/o male who is scheduled for right lap partial nephrectomy, possible ope, possible radical, cystoscopy on 4-1-19  * Given pre op instructions and scrub cleanser PROBLEM DIAGNOSES  Problem: Kidney mass  Assessment and Plan: This is a  64 y/o male who is scheduled for right lap partial nephrectomy, possible ope, possible radical, cystoscopy on 4-1-19  * Given pre op instructions and scrub cleanser    Problem: Atrial fibrillation  Assessment and Plan: Await medical evaluation with primary. PCP to determine when or if to stop coumadin. Patient states that MD will bridge patient with lovenox and to see pcp on 3-19-19    Problem: Diabetes  Assessment and Plan: Await endocrine evaluation with endocrinologist due to diabete and recent changes being made at this date to his insulin doses  * Await old ekg for comparison, echo and stress test from cardiologist  * Instructed to not take glipizide the night before surgery or the am of surgery  * To take Lantus 12.8 units the night before surgery    Problem: Sleep apnea  Assessment and Plan: Notified OR booking via fax of sleep apnea  * Await sleep study from pulmonologist PROBLEM DIAGNOSES  Problem: Kidney mass  Assessment and Plan: This is a  62 y/o male who is scheduled for right lap partial nephrectomy, possible ope, possible radical, cystoscopy on 4-1-19  * Given pre op instructions and scrub cleanser    Problem: Atrial fibrillation  Assessment and Plan: Await medical evaluation with primary. PCP to determine when or if to stop coumadin. Patient states that MD will bridge patient with lovenox and to see pcp on 3-19-19  * Notified Poly in surgeon's office of pre op medical, cardiac, and endocrine evaluation    Problem: Diabetes  Assessment and Plan: Await endocrine evaluation with endocrinologist due to diabete and recent changes being made at this date to his insulin doses  * Await old ekg for comparison, echo and stress test from cardiologist  * Instructed to not take glipizide the night before surgery or the am of surgery  * To take Lantus 12.8 units the night before surgery    Problem: Sleep apnea  Assessment and Plan: Notified OR booking via fax of sleep apnea  * Await sleep study from pulmonologist

## 2019-03-13 NOTE — H&P PST ADULT - HISTORY OF PRESENT ILLNESS
This is a  64 y/o male who presents with h/o left partial nephrectomy in 2018 due to cancer. Patient c/o right flank area and visited MD. Finding of right kidney mass noted. Scheduled for right lap partial nephrectomy, possible open radical,  cystoscopy on 4-1-19 This is a  64 y/o male who presents with h/o left partial nephrectomy in 2018 due to cancer. Patient c/o pain right flank area and visited MD. Finding of right kidney mass noted. Scheduled for right lap partial nephrectomy, possible open radical,  cystoscopy on 4-1-19

## 2019-03-13 NOTE — H&P PST ADULT - NSICDXPASTSURGICALHX_GEN_ALL_CORE_FT
PAST SURGICAL HISTORY:  Atrial fibrillation unsuccessful in 2001    H/O colonoscopy with polypectomy 03/2018    H/O partial nephrectomy left in 2018    History of tonsillectomy no

## 2019-03-13 NOTE — H&P PST ADULT - ENDOCRINE COMMENTS
denies thyroid disease; DM type II -- finger sticks in the am range 110-130; finger sticks in the pm range 140. Endocrinologist is changing his doses of insulin -- to see endocrinology before surgery

## 2019-03-13 NOTE — H&P PST ADULT - NSICDXFAMILYHX_GEN_ALL_CORE_FT
FAMILY HISTORY:  Family history of colon cancer    Sibling  Still living? Unknown  Diabetes mellitus, Age at diagnosis: Age Unknown

## 2019-03-13 NOTE — H&P PST ADULT - NSICDXPASTMEDICALHX_GEN_ALL_CORE_FT
PAST MEDICAL HISTORY:  Atrial fibrillation     Cardiomyopathy     Diabetes mellitus type 2, diagnosed in 2005; started insulin October 2018    Hard of hearing right side    Hepatitis C medically treated    Hypercholesterolemia     Hypertension     On Coumadin for atrial fibrillation unsuccessfully cardioverted in the past    MARIANA on CPAP     Other specified disorders of kidney and ureter left kidney mass in 2018 -- malignant

## 2019-03-14 PROBLEM — Z78.9 OTHER SPECIFIED HEALTH STATUS: Chronic | Status: ACTIVE | Noted: 2019-03-13

## 2019-03-14 PROBLEM — N28.89 OTHER SPECIFIED DISORDERS OF KIDNEY AND URETER: Chronic | Status: ACTIVE | Noted: 2018-04-06

## 2019-03-14 PROBLEM — H91.90 UNSPECIFIED HEARING LOSS, UNSPECIFIED EAR: Chronic | Status: ACTIVE | Noted: 2019-03-13

## 2019-03-14 PROBLEM — B19.20 UNSPECIFIED VIRAL HEPATITIS C WITHOUT HEPATIC COMA: Chronic | Status: ACTIVE | Noted: 2019-03-13

## 2019-03-14 PROBLEM — E11.9 TYPE 2 DIABETES MELLITUS WITHOUT COMPLICATIONS: Chronic | Status: ACTIVE | Noted: 2018-04-06

## 2019-03-14 LAB
BACTERIA UR CULT: SIGNIFICANT CHANGE UP
SPECIMEN SOURCE: SIGNIFICANT CHANGE UP

## 2019-03-18 ENCOUNTER — MESSAGE (OUTPATIENT)
Age: 64
End: 2019-03-18

## 2019-03-19 ENCOUNTER — APPOINTMENT (OUTPATIENT)
Dept: INTERNAL MEDICINE | Facility: CLINIC | Age: 64
End: 2019-03-19
Payer: COMMERCIAL

## 2019-03-19 ENCOUNTER — RX RENEWAL (OUTPATIENT)
Age: 64
End: 2019-03-19

## 2019-03-19 VITALS
DIASTOLIC BLOOD PRESSURE: 80 MMHG | RESPIRATION RATE: 16 BRPM | SYSTOLIC BLOOD PRESSURE: 125 MMHG | OXYGEN SATURATION: 98 % | HEIGHT: 70 IN | WEIGHT: 232 LBS | BODY MASS INDEX: 33.21 KG/M2 | HEART RATE: 70 BPM

## 2019-03-19 DIAGNOSIS — I34.0 NONRHEUMATIC MITRAL (VALVE) INSUFFICIENCY: ICD-10-CM

## 2019-03-19 DIAGNOSIS — N17.9 ACUTE KIDNEY FAILURE, UNSPECIFIED: ICD-10-CM

## 2019-03-19 LAB — INR PPP: 3 RATIO

## 2019-03-19 PROCEDURE — 85610 PROTHROMBIN TIME: CPT | Mod: QW

## 2019-03-19 PROCEDURE — 99214 OFFICE O/P EST MOD 30 MIN: CPT | Mod: 25

## 2019-03-19 NOTE — HISTORY OF PRESENT ILLNESS
[FreeTextEntry1] : laparoscopic right partial nephrectomy [FreeTextEntry3] : Dr. Michelle  [FreeTextEntry4] : 63-year-old male with extensive medical history presents for evaluation prior to laparoscopic right partial nephrectomy secondary to right renal mass.\par The patient had a partial nephrectomy of the left kidney secondary to RCC one year ago.\par \par Patient's medical history includes nonischemic cardiomyopathy with normal coronary arteries on previous cardiac catheterization and normalization of ejection fraction.\par \par Also type 2 diabetes, hypertension, hyperlipidemia, obesity and obstructive sleep apnea with use of CPAP.\par Also atrial fibrillation on Coumadin\par Also hepatitis C status post therapy.\par \par The patient is generally feeling well without complaints of chest pain, palpitations, shortness of breath, fever, abdominal pain. [FreeTextEntry2] : April 1, 2019 [FreeTextEntry7] : Patient to see cardiology preoperatively tomorrow

## 2019-03-19 NOTE — PHYSICAL EXAM
[No Acute Distress] : no acute distress [Normal Sclera/Conjunctiva] : normal sclera/conjunctiva [No JVD] : no jugular venous distention [No Respiratory Distress] : no respiratory distress  [Clear to Auscultation] : lungs were clear to auscultation bilaterally [No Accessory Muscle Use] : no accessory muscle use [No Carotid Bruits] : no carotid bruits [Normal Rate] : normal rate  [Regular Rhythm] : with a regular rhythm [Soft] : abdomen soft [No Edema] : there was no peripheral edema [Non Tender] : non-tender [Non-distended] : non-distended [No Masses] : no abdominal mass palpated [Normal Gait] : normal gait [No HSM] : no HSM [No Focal Deficits] : no focal deficits [Speech Grossly Normal] : speech grossly normal [Normal Affect] : the affect was normal [Normal Mood] : the mood was normal

## 2019-03-19 NOTE — ASSESSMENT
[Patient Optimized for Surgery] : Patient optimized for surgery [Modify anticoagulant treatment prior to procedure] : Modify anticoagulant treatment prior to procedure [No Further Testing Recommended] : no further testing recommended [FreeTextEntry4] : 63-year-old male currently medically stable with no contraindication to planned  laparoscopic partial right nephrectomy.\par \par The patient is on Coumadin for atrial fibrillation and want to avoid any window of not being anticoagulated therefore patient will be bridged with Lovenox.\par The patient will be seen again next week after Coumadin is stopped and once INR is less than 2 Lovenox will be started\par \par All other medications are to be continued except patient no glipizide the evening before and the morning of the procedure. Also Lantus dose decreased to 8 units the night before.\par \par Postoperatively the patient should be anticoagulated with low molecular weight heparin as soon as possible and restarted on Coumadin the day of his surgery.\par Patient should be continued on Lovenox until INR is greater than 2 then discontinue Lovenox. [Modify medications prior to procedure] : Modify medications prior to procedure [FreeTextEntry7] : No diabetic medications the morning of procedures [FreeTextEntry5] : Coumadin to be held with Lovenox bridge

## 2019-03-19 NOTE — RESULTS/DATA
[] : results reviewed [de-identified] : WNL [de-identified] : Atrial fibrillation with controlled ventricular rate, LAHB, nonspecific ST-T changes [de-identified] : WNL with creatinine stable at 1.5 [de-identified] : HbA1c decreased to 6.3\par \par INR today=3.0

## 2019-03-20 PROBLEM — E78.00 PURE HYPERCHOLESTEROLEMIA, UNSPECIFIED: Chronic | Status: ACTIVE | Noted: 2019-03-13

## 2019-03-21 ENCOUNTER — MEDICATION RENEWAL (OUTPATIENT)
Age: 64
End: 2019-03-21

## 2019-03-21 ENCOUNTER — TRANSCRIPTION ENCOUNTER (OUTPATIENT)
Age: 64
End: 2019-03-21

## 2019-03-26 ENCOUNTER — APPOINTMENT (OUTPATIENT)
Dept: UROLOGY | Facility: CLINIC | Age: 64
End: 2019-03-26

## 2019-03-27 ENCOUNTER — APPOINTMENT (OUTPATIENT)
Dept: INTERNAL MEDICINE | Facility: CLINIC | Age: 64
End: 2019-03-27
Payer: COMMERCIAL

## 2019-03-27 ENCOUNTER — APPOINTMENT (OUTPATIENT)
Dept: INTERNAL MEDICINE | Facility: CLINIC | Age: 64
End: 2019-03-27

## 2019-03-27 VITALS
WEIGHT: 226 LBS | DIASTOLIC BLOOD PRESSURE: 80 MMHG | HEIGHT: 70 IN | RESPIRATION RATE: 12 BRPM | BODY MASS INDEX: 32.35 KG/M2 | SYSTOLIC BLOOD PRESSURE: 122 MMHG | HEART RATE: 78 BPM

## 2019-03-27 LAB — INR PPP: 1.9 RATIO

## 2019-03-27 PROCEDURE — 99213 OFFICE O/P EST LOW 20 MIN: CPT | Mod: 25

## 2019-03-27 PROCEDURE — 85610 PROTHROMBIN TIME: CPT | Mod: QW

## 2019-03-27 RX ORDER — ACETAMINOPHEN AND CODEINE 300; 30 MG/1; MG/1
300-30 TABLET ORAL
Qty: 12 | Refills: 0 | Status: DISCONTINUED | COMMUNITY
Start: 2019-01-04

## 2019-03-27 NOTE — PHYSICAL EXAM
[No Acute Distress] : no acute distress [No Respiratory Distress] : no respiratory distress  [Clear to Auscultation] : lungs were clear to auscultation bilaterally [Normal Rate] : normal rate  [Regular Rhythm] : with a regular rhythm [No Focal Deficits] : no focal deficits [Normal Affect] : the affect was normal

## 2019-03-27 NOTE — HISTORY OF PRESENT ILLNESS
[FreeTextEntry1] : Followup INR [de-identified] : The patient presents after his isn't on March 19, 2019 when he was seen for preoperative evaluation for upcoming laparoscopic partial right nephrectomy on April 1, 2019\par \par On that visit a plan for transition from Coumadin to Lovenox was done.\par \par The patient now has been off of Coumadin for 2 days and followup for an INR and once less than 2 Lovenox will be started.

## 2019-03-27 NOTE — ASSESSMENT
[FreeTextEntry1] : INR now less than 2 therefore start Lovenox 1 mg/kilogram q.12 h. with schedule given to patient and wife will plan preoperatively which he will only take it once in the morning the day before his surgery.\par \par Postoperatively the patient should be anticoagulated and according to the patient and wife cardiology is switching him to a NOAC Eliquis 5 mg BID

## 2019-03-31 ENCOUNTER — TRANSCRIPTION ENCOUNTER (OUTPATIENT)
Age: 64
End: 2019-03-31

## 2019-04-01 ENCOUNTER — INPATIENT (INPATIENT)
Facility: HOSPITAL | Age: 64
LOS: 1 days | Discharge: HOME | End: 2019-04-03
Attending: UROLOGY | Admitting: UROLOGY
Payer: COMMERCIAL

## 2019-04-01 ENCOUNTER — RESULT REVIEW (OUTPATIENT)
Age: 64
End: 2019-04-01

## 2019-04-01 ENCOUNTER — APPOINTMENT (OUTPATIENT)
Dept: UROLOGY | Facility: HOSPITAL | Age: 64
End: 2019-04-01

## 2019-04-01 VITALS
TEMPERATURE: 99 F | DIASTOLIC BLOOD PRESSURE: 103 MMHG | HEART RATE: 89 BPM | OXYGEN SATURATION: 97 % | WEIGHT: 229.06 LBS | SYSTOLIC BLOOD PRESSURE: 143 MMHG | RESPIRATION RATE: 15 BRPM | HEIGHT: 68.5 IN

## 2019-04-01 DIAGNOSIS — I48.91 UNSPECIFIED ATRIAL FIBRILLATION: Chronic | ICD-10-CM

## 2019-04-01 DIAGNOSIS — I10 ESSENTIAL (PRIMARY) HYPERTENSION: ICD-10-CM

## 2019-04-01 DIAGNOSIS — D62 ACUTE POSTHEMORRHAGIC ANEMIA: ICD-10-CM

## 2019-04-01 DIAGNOSIS — B19.20 UNSPECIFIED VIRAL HEPATITIS C WITHOUT HEPATIC COMA: ICD-10-CM

## 2019-04-01 DIAGNOSIS — Z29.9 ENCOUNTER FOR PROPHYLACTIC MEASURES, UNSPECIFIED: ICD-10-CM

## 2019-04-01 DIAGNOSIS — C64.2 MALIGNANT NEOPLASM OF LEFT KIDNEY, EXCEPT RENAL PELVIS: ICD-10-CM

## 2019-04-01 DIAGNOSIS — Z98.890 OTHER SPECIFIED POSTPROCEDURAL STATES: Chronic | ICD-10-CM

## 2019-04-01 DIAGNOSIS — N28.89 OTHER SPECIFIED DISORDERS OF KIDNEY AND URETER: ICD-10-CM

## 2019-04-01 DIAGNOSIS — Z90.5 ACQUIRED ABSENCE OF KIDNEY: Chronic | ICD-10-CM

## 2019-04-01 DIAGNOSIS — N18.3 CHRONIC KIDNEY DISEASE, STAGE 3 (MODERATE): ICD-10-CM

## 2019-04-01 LAB
GLUCOSE BLDC GLUCOMTR-MCNC: 121 MG/DL — HIGH (ref 70–99)
GLUCOSE BLDC GLUCOMTR-MCNC: 134 MG/DL — HIGH (ref 70–99)
GLUCOSE BLDC GLUCOMTR-MCNC: 141 MG/DL — HIGH (ref 70–99)

## 2019-04-01 PROCEDURE — 88307 TISSUE EXAM BY PATHOLOGIST: CPT | Mod: 26

## 2019-04-01 PROCEDURE — 50543 LAPARO PARTIAL NEPHRECTOMY: CPT | Mod: RT

## 2019-04-01 PROCEDURE — 88312 SPECIAL STAINS GROUP 1: CPT | Mod: 26

## 2019-04-01 PROCEDURE — 88331 PATH CONSLTJ SURG 1 BLK 1SPC: CPT | Mod: 26

## 2019-04-01 PROCEDURE — 76998 US GUIDE INTRAOP: CPT | Mod: 26

## 2019-04-01 PROCEDURE — 88305 TISSUE EXAM BY PATHOLOGIST: CPT | Mod: 26

## 2019-04-01 PROCEDURE — 99223 1ST HOSP IP/OBS HIGH 75: CPT

## 2019-04-01 RX ORDER — DOCUSATE SODIUM 100 MG
1 CAPSULE ORAL
Qty: 0 | Refills: 0 | COMMUNITY

## 2019-04-01 RX ORDER — DOCUSATE SODIUM 100 MG
100 CAPSULE ORAL THREE TIMES A DAY
Qty: 0 | Refills: 0 | Status: DISCONTINUED | OUTPATIENT
Start: 2019-04-01 | End: 2019-04-03

## 2019-04-01 RX ORDER — ATORVASTATIN CALCIUM 80 MG/1
20 TABLET, FILM COATED ORAL AT BEDTIME
Qty: 0 | Refills: 0 | Status: DISCONTINUED | OUTPATIENT
Start: 2019-04-01 | End: 2019-04-03

## 2019-04-01 RX ORDER — ENOXAPARIN SODIUM 100 MG/ML
16 INJECTION SUBCUTANEOUS
Qty: 0 | Refills: 0 | COMMUNITY

## 2019-04-01 RX ORDER — ACETAMINOPHEN 500 MG
1000 TABLET ORAL ONCE
Qty: 0 | Refills: 0 | Status: COMPLETED | OUTPATIENT
Start: 2019-04-01 | End: 2019-04-01

## 2019-04-01 RX ORDER — TRAMADOL HYDROCHLORIDE 50 MG/1
25 TABLET ORAL EVERY 4 HOURS
Qty: 0 | Refills: 0 | Status: DISCONTINUED | OUTPATIENT
Start: 2019-04-01 | End: 2019-04-03

## 2019-04-01 RX ORDER — DEXTROSE 50 % IN WATER 50 %
25 SYRINGE (ML) INTRAVENOUS ONCE
Qty: 0 | Refills: 0 | Status: DISCONTINUED | OUTPATIENT
Start: 2019-04-01 | End: 2019-04-03

## 2019-04-01 RX ORDER — KETOROLAC TROMETHAMINE 30 MG/ML
15 SYRINGE (ML) INJECTION EVERY 6 HOURS
Qty: 0 | Refills: 0 | Status: DISCONTINUED | OUTPATIENT
Start: 2019-04-01 | End: 2019-04-01

## 2019-04-01 RX ORDER — HEPARIN SODIUM 5000 [USP'U]/ML
5000 INJECTION INTRAVENOUS; SUBCUTANEOUS EVERY 8 HOURS
Qty: 0 | Refills: 0 | Status: DISCONTINUED | OUTPATIENT
Start: 2019-04-01 | End: 2019-04-03

## 2019-04-01 RX ORDER — DEXTROSE 50 % IN WATER 50 %
15 SYRINGE (ML) INTRAVENOUS ONCE
Qty: 0 | Refills: 0 | Status: DISCONTINUED | OUTPATIENT
Start: 2019-04-01 | End: 2019-04-03

## 2019-04-01 RX ORDER — SODIUM CHLORIDE 9 MG/ML
1000 INJECTION, SOLUTION INTRAVENOUS
Qty: 0 | Refills: 0 | Status: DISCONTINUED | OUTPATIENT
Start: 2019-04-01 | End: 2019-04-03

## 2019-04-01 RX ORDER — GLUCAGON INJECTION, SOLUTION 0.5 MG/.1ML
1 INJECTION, SOLUTION SUBCUTANEOUS ONCE
Qty: 0 | Refills: 0 | Status: DISCONTINUED | OUTPATIENT
Start: 2019-04-01 | End: 2019-04-03

## 2019-04-01 RX ORDER — INSULIN LISPRO 100/ML
VIAL (ML) SUBCUTANEOUS
Qty: 0 | Refills: 0 | Status: DISCONTINUED | OUTPATIENT
Start: 2019-04-01 | End: 2019-04-03

## 2019-04-01 RX ORDER — INSULIN LISPRO 100/ML
VIAL (ML) SUBCUTANEOUS AT BEDTIME
Qty: 0 | Refills: 0 | Status: DISCONTINUED | OUTPATIENT
Start: 2019-04-01 | End: 2019-04-03

## 2019-04-01 RX ORDER — INSULIN GLARGINE 100 [IU]/ML
8 INJECTION, SOLUTION SUBCUTANEOUS AT BEDTIME
Qty: 0 | Refills: 0 | Status: DISCONTINUED | OUTPATIENT
Start: 2019-04-01 | End: 2019-04-03

## 2019-04-01 RX ORDER — DEXTROSE 50 % IN WATER 50 %
12.5 SYRINGE (ML) INTRAVENOUS ONCE
Qty: 0 | Refills: 0 | Status: DISCONTINUED | OUTPATIENT
Start: 2019-04-01 | End: 2019-04-03

## 2019-04-01 RX ORDER — TRAMADOL HYDROCHLORIDE 50 MG/1
50 TABLET ORAL EVERY 6 HOURS
Qty: 0 | Refills: 0 | Status: DISCONTINUED | OUTPATIENT
Start: 2019-04-01 | End: 2019-04-03

## 2019-04-01 RX ORDER — METOPROLOL TARTRATE 50 MG
50 TABLET ORAL
Qty: 0 | Refills: 0 | Status: DISCONTINUED | OUTPATIENT
Start: 2019-04-02 | End: 2019-04-03

## 2019-04-01 RX ORDER — METOPROLOL TARTRATE 50 MG
100 TABLET ORAL DAILY
Qty: 0 | Refills: 0 | Status: DISCONTINUED | OUTPATIENT
Start: 2019-04-01 | End: 2019-04-01

## 2019-04-01 RX ORDER — DILTIAZEM HCL 120 MG
240 CAPSULE, EXT RELEASE 24 HR ORAL DAILY
Qty: 0 | Refills: 0 | Status: DISCONTINUED | OUTPATIENT
Start: 2019-04-01 | End: 2019-04-03

## 2019-04-01 RX ORDER — ACETAMINOPHEN 500 MG
1000 TABLET ORAL ONCE
Qty: 0 | Refills: 0 | Status: COMPLETED | OUTPATIENT
Start: 2019-04-02 | End: 2019-04-02

## 2019-04-01 RX ORDER — ONDANSETRON 8 MG/1
4 TABLET, FILM COATED ORAL ONCE
Qty: 0 | Refills: 0 | Status: COMPLETED | OUTPATIENT
Start: 2019-04-01 | End: 2019-04-01

## 2019-04-01 RX ORDER — INSULIN GLARGINE 100 [IU]/ML
16 INJECTION, SOLUTION SUBCUTANEOUS AT BEDTIME
Qty: 0 | Refills: 0 | Status: DISCONTINUED | OUTPATIENT
Start: 2019-04-01 | End: 2019-04-01

## 2019-04-01 RX ORDER — ROSUVASTATIN CALCIUM 5 MG/1
1 TABLET ORAL
Qty: 0 | Refills: 0 | COMMUNITY

## 2019-04-01 RX ORDER — HYDROMORPHONE HYDROCHLORIDE 2 MG/ML
0.5 INJECTION INTRAMUSCULAR; INTRAVENOUS; SUBCUTANEOUS
Qty: 0 | Refills: 0 | Status: DISCONTINUED | OUTPATIENT
Start: 2019-04-01 | End: 2019-04-01

## 2019-04-01 RX ORDER — SENNA PLUS 8.6 MG/1
2 TABLET ORAL AT BEDTIME
Qty: 0 | Refills: 0 | Status: DISCONTINUED | OUTPATIENT
Start: 2019-04-01 | End: 2019-04-03

## 2019-04-01 RX ADMIN — ONDANSETRON 4 MILLIGRAM(S): 8 TABLET, FILM COATED ORAL at 10:21

## 2019-04-01 RX ADMIN — Medication 1: at 12:45

## 2019-04-01 RX ADMIN — Medication 100 MILLIGRAM(S): at 21:19

## 2019-04-01 RX ADMIN — Medication 15 MILLIGRAM(S): at 17:01

## 2019-04-01 RX ADMIN — INSULIN GLARGINE 8 UNIT(S): 100 INJECTION, SOLUTION SUBCUTANEOUS at 21:18

## 2019-04-01 RX ADMIN — TRAMADOL HYDROCHLORIDE 50 MILLIGRAM(S): 50 TABLET ORAL at 23:33

## 2019-04-01 RX ADMIN — Medication 15 MILLIGRAM(S): at 13:15

## 2019-04-01 RX ADMIN — HEPARIN SODIUM 5000 UNIT(S): 5000 INJECTION INTRAVENOUS; SUBCUTANEOUS at 21:19

## 2019-04-01 RX ADMIN — HEPARIN SODIUM 5000 UNIT(S): 5000 INJECTION INTRAVENOUS; SUBCUTANEOUS at 14:38

## 2019-04-01 RX ADMIN — Medication 15 MILLIGRAM(S): at 12:30

## 2019-04-01 RX ADMIN — Medication 400 MILLIGRAM(S): at 16:06

## 2019-04-01 RX ADMIN — TRAMADOL HYDROCHLORIDE 50 MILLIGRAM(S): 50 TABLET ORAL at 23:03

## 2019-04-01 RX ADMIN — Medication 400 MILLIGRAM(S): at 21:25

## 2019-04-01 RX ADMIN — SODIUM CHLORIDE 125 MILLILITER(S): 9 INJECTION, SOLUTION INTRAVENOUS at 16:06

## 2019-04-01 RX ADMIN — SODIUM CHLORIDE 125 MILLILITER(S): 9 INJECTION, SOLUTION INTRAVENOUS at 10:21

## 2019-04-01 RX ADMIN — SODIUM CHLORIDE 75 MILLILITER(S): 9 INJECTION, SOLUTION INTRAVENOUS at 21:17

## 2019-04-01 RX ADMIN — ATORVASTATIN CALCIUM 20 MILLIGRAM(S): 80 TABLET, FILM COATED ORAL at 21:19

## 2019-04-01 NOTE — CONSULT NOTE ADULT - PROBLEM SELECTOR RECOMMENDATION 4
-on lantus 16 units hs at home  -cut down to 1/2 dose = 8 units hs for now, ISS, monitor FS  -A1c 6.3%

## 2019-04-01 NOTE — CONSULT NOTE ADULT - PROBLEM SELECTOR RECOMMENDATION 9
-s/p right lap partial nephrectomy 4/1  -postop management per primary team, pain control, IVF, incentive spirometry, dvt ppx heparin sc for dvt ppx

## 2019-04-01 NOTE — CONSULT NOTE ADULT - PROBLEM SELECTOR RECOMMENDATION 6
monitor renal fxn and U/O, last creat ~1.5 mg/dl  consider d/c Toradol if creat bumps postop   avoid nephrotoxins (nsaids, contrast), adjust meds per renal fxn

## 2019-04-01 NOTE — PATIENT PROFILE ADULT - HOW PATIENT ADDRESSED, PROFILE
Pt s/p left thoracentesis.  Pt c/o 3/10 to left chest, improving since procedure.  Pt states shortness of breath also improving since procedure.  Resting comfortably with daughter at bedside.   Inderjit

## 2019-04-01 NOTE — CONSULT NOTE ADULT - PROBLEM SELECTOR RECOMMENDATION 7
c/w home meds, avoid hypotension, IVF, monitor BP c/w home meds, avoid hypotension, IVF, monitor BP  uncontrolled now sbp 160, pain control, change beta blocker to lopressor 50 mg bid, if remains high SBP>160 despite adequate pain control, can give hydralazine 5 mg iv q8h prn

## 2019-04-01 NOTE — CONSULT NOTE ADULT - PROBLEM SELECTOR RECOMMENDATION 3
-coumadin held preop, pt's cardiologist wants to switch to NOAC (eliquis or xarelto) postop when cleared by urology  -c/w toprol  and cardizem for rate control with holding parameters  -prior echo in Dec 2016 showed normal LVEF 60-65%,  -preop stress test achieved 7 METS, no myocardial ischemia

## 2019-04-01 NOTE — CONSULT NOTE ADULT - SUBJECTIVE AND OBJECTIVE BOX
Florina Chiu MD  Pager [phone]    HPI:  This is a  62 y/o male with h/o HTN, MARIANA on CPAP, CM, Afib on coumadin, DM2, HCV (treated 10years ago), prior left partial nephrectomy in 2018 due to RCC, c/o right flank pain, found to have right renal mass, admitted for right lap partial nephrectomy. Patient seen postop in pacu, c/o nausea, pain relatively controlled, denies chest pain/sob.   Pt states his coumadin was held preop one week ago and his cardiologist wants to transition to eliquis postop when cleared by urology.    PAST MEDICAL & SURGICAL HISTORY:  Hard of hearing: right side  Hepatitis C: medically treated  On Coumadin for atrial fibrillation: unsuccessfully cardioverted in the past  Hypercholesterolemia  Atrial fibrillation  Cardiomyopathy  MARIANA on CPAP  Other specified disorders of kidney and ureter: left kidney mass in 2018 -- malignant  Diabetes mellitus: type 2, diagnosed in 2005; started insulin October 2018  Hypertension  Atrial fibrillation: unsuccessful in 2001  H/O partial nephrectomy: left in 2018  H/O colonoscopy with polypectomy: 03/2018  History of tonsillectomy      Review of Systems:   CONSTITUTIONAL: No fever, weight loss, or fatigue  EYES: No eye pain, visual disturbances, or discharge  ENMT:  No difficulty hearing, tinnitus, vertigo; No sinus or throat pain  NECK: No pain or stiffness  BREASTS: No pain, masses, or nipple discharge  RESPIRATORY: No cough, wheezing, chills or hemoptysis; No shortness of breath  CARDIOVASCULAR: No chest pain, palpitations, dizziness, or leg swelling  GASTROINTESTINAL: right flank pain. + nausea, no vomiting, or hematemesis; No diarrhea or constipation. No melena or hematochezia.  GENITOURINARY: No dysuria, frequency, hematuria, or incontinence  NEUROLOGICAL: No headaches, memory loss, loss of strength, numbness, or tremors  SKIN: No itching, burning, rashes, or lesions   LYMPH NODES: No enlarged glands  ENDOCRINE: No heat or cold intolerance; No hair loss  MUSCULOSKELETAL: No joint pain or swelling; No muscle, back, or extremity pain  PSYCHIATRIC: No depression, anxiety, mood swings, or difficulty sleeping  HEME/LYMPH: No easy bruising, or bleeding gums  ALLERY AND IMMUNOLOGIC: No hives or eczema    Allergies    No Known Allergies    Intolerances    Social History:   , vu, remote smoking quit in 1979, drinks beer 1-2 drinks 2-3x/wk    FAMILY HISTORY:  Family history of colon cancer  Diabetes mellitus (Sibling)      Home Medications:  acetaminophen 325 mg oral tablet: 2 tab(s) orally every 6 hours, As needed, Mild Pain (1 - 3) (01 Apr 2019 08:31)  Colace 100 mg oral capsule: 1 cap(s) orally 3 times a day (01 Apr 2019 08:31)  Coumadin 5 mg oral tablet: 1 tab(s) orally once a day in pm restart on Monday, 4/30 (01 Apr 2019 08:31)  Crestor 5 mg oral tablet: 1 tab(s) orally once a day (at bedtime) (01 Apr 2019 08:31)  dilTIAZem 240 mg/24 hours oral capsule, extended release: 1 cap(s) orally 2 times a day (01 Apr 2019 08:31)  glipiZIDE 10 mg oral tablet, extended release: 1 tab(s) orally 2 times a day (01 Apr 2019 08:31)  Lantus Solostar Pen 100 units/mL subcutaneous solution: 16 unit(s) subcutaneous once a day (at bedtime) (01 Apr 2019 08:31)  magnesium oxide 50 mg 2x/week:  (01 Apr 2019 08:31)  metoprolol succinate 100 mg oral tablet, extended release: 1 tab(s) orally 2 times a day (01 Apr 2019 08:31)  zinc 50 mg orally daily:  (01 Apr 2019 08:31)      MEDICATIONS  (STANDING):  acetaminophen  IVPB .. 1000 milliGRAM(s) IV Intermittent once  acetaminophen  IVPB .. 1000 milliGRAM(s) IV Intermittent once  acetaminophen  IVPB .. 1000 milliGRAM(s) IV Intermittent once  atorvastatin 20 milliGRAM(s) Oral at bedtime  dextrose 5%. 1000 milliLiter(s) (50 mL/Hr) IV Continuous <Continuous>  dextrose 50% Injectable 12.5 Gram(s) IV Push once  dextrose 50% Injectable 25 Gram(s) IV Push once  dextrose 50% Injectable 25 Gram(s) IV Push once  diltiazem    milliGRAM(s) Oral daily  docusate sodium 100 milliGRAM(s) Oral three times a day  heparin  Injectable 5000 Unit(s) SubCutaneous every 8 hours  insulin glargine Injectable (LANTUS) 8 Unit(s) SubCutaneous at bedtime  insulin lispro (HumaLOG) corrective regimen sliding scale   SubCutaneous three times a day before meals  insulin lispro (HumaLOG) corrective regimen sliding scale   SubCutaneous at bedtime  ketorolac   Injectable 15 milliGRAM(s) IV Push every 6 hours  lactated ringers. 1000 milliLiter(s) (125 mL/Hr) IV Continuous <Continuous>  metoprolol succinate  milliGRAM(s) Oral daily    MEDICATIONS  (PRN):  dextrose 40% Gel 15 Gram(s) Oral once PRN Blood Glucose LESS THAN 70 milliGRAM(s)/deciliter  glucagon  Injectable 1 milliGRAM(s) IntraMuscular once PRN Glucose LESS THAN 70 milligrams/deciliter  HYDROmorphone  Injectable 0.5 milliGRAM(s) IV Push every 10 minutes PRN Severe Pain (7 - 10)  senna 2 Tablet(s) Oral at bedtime PRN Constipation      Vital Signs Last 24 Hrs  T(C): 36.5 (01 Apr 2019 14:00), Max: 37.1 (01 Apr 2019 08:11)  T(F): 97.7 (01 Apr 2019 14:00), Max: 98.8 (01 Apr 2019 08:11)  HR: 79 (01 Apr 2019 14:30) (77 - 89)  BP: 168/99 (01 Apr 2019 14:30) (142/85 - 168/99)  BP(mean): 114 (01 Apr 2019 14:30) (98 - 126)  RR: 19 (01 Apr 2019 14:30) (9 - 21)  SpO2: 99% (01 Apr 2019 14:30) (95% - 100%)  CAPILLARY BLOOD GLUCOSE      POCT Blood Glucose.: 141 mg/dL (01 Apr 2019 08:21)    I&O's Summary    01 Apr 2019 07:01  -  01 Apr 2019 15:38  --------------------------------------------------------  IN: 500 mL / OUT: 340 mL / NET: 160 mL        PHYSICAL EXAM:  GENERAL: NAD, well-developed  HEAD:  Atraumatic, Normocephalic  EYES: EOMI, PERRLA, conjunctiva and sclera clear  NECK: Supple, No JVD  CHEST/LUNG: Clear to auscultation bilaterally; No wheeze  HEART: irregularly irregular; No murmurs, rubs, or gallops  ABDOMEN: Soft, incisional tenderness, wound c/d/i; Nondistended; Bowel sounds present  : salvador  EXTREMITIES:  2+ Peripheral Pulses, No clubbing, cyanosis, or edema  PSYCH: AAOx3  NEUROLOGY: non-focal  SKIN: No rashes or lesions    LABS:    Microbiology     RADIOLOGY & ADDITIONAL TESTS:    Imaging Personally Reviewed:  < from: CT Abdomen and Pelvis w/ Oral Cont and w/wo IV Cont (03.01.19 @ 09:45) >  IMPRESSION:     Status post partial left nephrectomy without recurrent mass.    Newly noted 1.1 cm indeterminate right renal lesion. Characterization   with contrast enhanced MRI is recommended.    Mild diffuse bladder wall thickening.    Consultant(s) Notes Reviewed:      Care Discussed with Consultants/Other Providers: Florina Chiu MD  Pager [phone]    HPI:  This is a  62 y/o male with h/o HTN, MARIANA on CPAP, CM, Afib on coumadin, DM2, HCV (treated 10years ago), prior left partial nephrectomy in 2018 due to RCC, c/o right flank pain, found to have right renal mass, admitted for right lap partial nephrectomy. Patient seen postop in pacu, c/o nausea, pain relatively controlled, denies chest pain/sob.   Pt states his coumadin was held preop one week ago and his cardiologist wants to transition to eliquis postop when cleared by urology.    PAST MEDICAL & SURGICAL HISTORY:  Hard of hearing: right side  Hepatitis C: medically treated  On Coumadin for atrial fibrillation: unsuccessfully cardioverted in the past  Hypercholesterolemia  Atrial fibrillation  Cardiomyopathy  MARIANA on CPAP  Other specified disorders of kidney and ureter: left kidney mass in 2018 -- malignant  Diabetes mellitus: type 2, diagnosed in 2005; started insulin October 2018  Hypertension  Atrial fibrillation: unsuccessful in 2001  H/O partial nephrectomy: left in 2018  H/O colonoscopy with polypectomy: 03/2018  History of tonsillectomy      Review of Systems:   CONSTITUTIONAL: No fever, weight loss, or fatigue  EYES: No eye pain, visual disturbances, or discharge  ENMT:  No difficulty hearing, tinnitus, vertigo; No sinus or throat pain  NECK: No pain or stiffness  BREASTS: No pain, masses, or nipple discharge  RESPIRATORY: No cough, wheezing, chills or hemoptysis; No shortness of breath  CARDIOVASCULAR: No chest pain, palpitations, dizziness, or leg swelling  GASTROINTESTINAL: right flank pain. + nausea, no vomiting, or hematemesis; No diarrhea or constipation. No melena or hematochezia.  GENITOURINARY: No dysuria, frequency, hematuria, or incontinence  NEUROLOGICAL: No headaches, memory loss, loss of strength, numbness, or tremors  SKIN: No itching, burning, rashes, or lesions   LYMPH NODES: No enlarged glands  ENDOCRINE: No heat or cold intolerance; No hair loss  MUSCULOSKELETAL: No joint pain or swelling; No muscle, back, or extremity pain  PSYCHIATRIC: No depression, anxiety, mood swings, or difficulty sleeping  HEME/LYMPH: No easy bruising, or bleeding gums  ALLERY AND IMMUNOLOGIC: No hives or eczema    Allergies    No Known Allergies    Intolerances    Social History:   , vu, remote smoking quit in 1979, drinks beer 1-2 drinks 2-3x/wk    FAMILY HISTORY:  Family history of colon cancer  Diabetes mellitus (Sibling)      Home Medications:  acetaminophen 325 mg oral tablet: 2 tab(s) orally every 6 hours, As needed, Mild Pain (1 - 3) (01 Apr 2019 08:31)  Colace 100 mg oral capsule: 1 cap(s) orally 3 times a day (01 Apr 2019 08:31)  Coumadin 5 mg oral tablet: 1 tab(s) orally once a day in pm restart on Monday, 4/30 (01 Apr 2019 08:31)  Crestor 5 mg oral tablet: 1 tab(s) orally once a day (at bedtime) (01 Apr 2019 08:31)  dilTIAZem 240 mg/24 hours oral capsule, extended release: 1 cap(s) orally 2 times a day (01 Apr 2019 08:31)  glipiZIDE 10 mg oral tablet, extended release: 1 tab(s) orally 2 times a day (01 Apr 2019 08:31)  Lantus Solostar Pen 100 units/mL subcutaneous solution: 16 unit(s) subcutaneous once a day (at bedtime) (01 Apr 2019 08:31)  magnesium oxide 50 mg 2x/week:  (01 Apr 2019 08:31)  metoprolol succinate 100 mg oral tablet, extended release: 1 tab(s) orally 2 times a day (01 Apr 2019 08:31)  zinc 50 mg orally daily:  (01 Apr 2019 08:31)      MEDICATIONS  (STANDING):  acetaminophen  IVPB .. 1000 milliGRAM(s) IV Intermittent once  acetaminophen  IVPB .. 1000 milliGRAM(s) IV Intermittent once  acetaminophen  IVPB .. 1000 milliGRAM(s) IV Intermittent once  atorvastatin 20 milliGRAM(s) Oral at bedtime  dextrose 5%. 1000 milliLiter(s) (50 mL/Hr) IV Continuous <Continuous>  dextrose 50% Injectable 12.5 Gram(s) IV Push once  dextrose 50% Injectable 25 Gram(s) IV Push once  dextrose 50% Injectable 25 Gram(s) IV Push once  diltiazem    milliGRAM(s) Oral daily  docusate sodium 100 milliGRAM(s) Oral three times a day  heparin  Injectable 5000 Unit(s) SubCutaneous every 8 hours  insulin glargine Injectable (LANTUS) 8 Unit(s) SubCutaneous at bedtime  insulin lispro (HumaLOG) corrective regimen sliding scale   SubCutaneous three times a day before meals  insulin lispro (HumaLOG) corrective regimen sliding scale   SubCutaneous at bedtime  ketorolac   Injectable 15 milliGRAM(s) IV Push every 6 hours  lactated ringers. 1000 milliLiter(s) (125 mL/Hr) IV Continuous <Continuous>  metoprolol succinate  milliGRAM(s) Oral daily    MEDICATIONS  (PRN):  dextrose 40% Gel 15 Gram(s) Oral once PRN Blood Glucose LESS THAN 70 milliGRAM(s)/deciliter  glucagon  Injectable 1 milliGRAM(s) IntraMuscular once PRN Glucose LESS THAN 70 milligrams/deciliter  HYDROmorphone  Injectable 0.5 milliGRAM(s) IV Push every 10 minutes PRN Severe Pain (7 - 10)  senna 2 Tablet(s) Oral at bedtime PRN Constipation      Vital Signs Last 24 Hrs  T(C): 36.5 (01 Apr 2019 14:00), Max: 37.1 (01 Apr 2019 08:11)  T(F): 97.7 (01 Apr 2019 14:00), Max: 98.8 (01 Apr 2019 08:11)  HR: 79 (01 Apr 2019 14:30) (77 - 89)  BP: 168/99 (01 Apr 2019 14:30) (142/85 - 168/99)  BP(mean): 114 (01 Apr 2019 14:30) (98 - 126)  RR: 19 (01 Apr 2019 14:30) (9 - 21)  SpO2: 99% (01 Apr 2019 14:30) (95% - 100%)  CAPILLARY BLOOD GLUCOSE      POCT Blood Glucose.: 141 mg/dL (01 Apr 2019 08:21)    I&O's Summary    01 Apr 2019 07:01  -  01 Apr 2019 15:38  --------------------------------------------------------  IN: 500 mL / OUT: 340 mL / NET: 160 mL        PHYSICAL EXAM:  GENERAL: NAD, well-developed  HEAD:  Atraumatic, Normocephalic  EYES: EOMI, PERRLA, conjunctiva and sclera clear  NECK: Supple, No JVD  CHEST/LUNG: Clear to auscultation bilaterally; No wheeze  HEART: irregularly irregular; No murmurs, rubs, or gallops  ABDOMEN: Soft, incisional tenderness, wound c/d/i; Nondistended; Bowel sounds present  : salvador  EXTREMITIES:  2+ Peripheral Pulses, No clubbing, cyanosis, or edema  PSYCH: AAOx3  NEUROLOGY: non-focal  SKIN: No rashes or lesions    LABS:    Microbiology     RADIOLOGY & ADDITIONAL TESTS:    Imaging Personally Reviewed:  < from: CT Abdomen and Pelvis w/ Oral Cont and w/wo IV Cont (03.01.19 @ 09:45) >  IMPRESSION:     Status post partial left nephrectomy without recurrent mass.    Newly noted 1.1 cm indeterminate right renal lesion. Characterization   with contrast enhanced MRI is recommended.    Mild diffuse bladder wall thickening.    Consultant(s) Notes Reviewed:      Care Discussed with Consultants/Other Providers: urology PA, uncontrolled bp, if remains high despite adequate pain control can give iv hydralazine 5 mg

## 2019-04-01 NOTE — CONSULT NOTE ADULT - ASSESSMENT
64 y/o male with h/o HTN, MARIANA on CPAP, CM, Afib on coumadin, DM2, HCV (treated 10years ago), prior left partial nephrectomy in 2018 due to RCC, right renal mass, now s/p right lap partial nephrectomy on 4/1/19

## 2019-04-01 NOTE — ASU PATIENT PROFILE, ADULT - ABILITY TO HEAR (WITH HEARING AID OR HEARING APPLIANCE IF NORMALLY USED):
Right side/Mildly to Moderately Impaired: difficulty hearing in some environments or speaker may need to increase volume or speak distinctly

## 2019-04-02 LAB
ANION GAP SERPL CALC-SCNC: 13 MMO/L — SIGNIFICANT CHANGE UP (ref 7–14)
BASOPHILS # BLD AUTO: 0.02 K/UL — SIGNIFICANT CHANGE UP (ref 0–0.2)
BASOPHILS NFR BLD AUTO: 0.2 % — SIGNIFICANT CHANGE UP (ref 0–2)
BUN SERPL-MCNC: 17 MG/DL — SIGNIFICANT CHANGE UP (ref 7–23)
CALCIUM SERPL-MCNC: 9 MG/DL — SIGNIFICANT CHANGE UP (ref 8.4–10.5)
CHLORIDE SERPL-SCNC: 100 MMOL/L — SIGNIFICANT CHANGE UP (ref 98–107)
CO2 SERPL-SCNC: 24 MMOL/L — SIGNIFICANT CHANGE UP (ref 22–31)
CREAT SERPL-MCNC: 1.48 MG/DL — HIGH (ref 0.5–1.3)
EOSINOPHIL # BLD AUTO: 0.01 K/UL — SIGNIFICANT CHANGE UP (ref 0–0.5)
EOSINOPHIL NFR BLD AUTO: 0.1 % — SIGNIFICANT CHANGE UP (ref 0–6)
GLUCOSE BLDC GLUCOMTR-MCNC: 130 MG/DL — HIGH (ref 70–99)
GLUCOSE BLDC GLUCOMTR-MCNC: 130 MG/DL — HIGH (ref 70–99)
GLUCOSE BLDC GLUCOMTR-MCNC: 134 MG/DL — HIGH (ref 70–99)
GLUCOSE BLDC GLUCOMTR-MCNC: 159 MG/DL — HIGH (ref 70–99)
GLUCOSE SERPL-MCNC: 119 MG/DL — HIGH (ref 70–99)
HCT VFR BLD CALC: 38.2 % — LOW (ref 39–50)
HGB BLD-MCNC: 12.6 G/DL — LOW (ref 13–17)
IMM GRANULOCYTES NFR BLD AUTO: 0.5 % — SIGNIFICANT CHANGE UP (ref 0–1.5)
LYMPHOCYTES # BLD AUTO: 0.89 K/UL — LOW (ref 1–3.3)
LYMPHOCYTES # BLD AUTO: 7.2 % — LOW (ref 13–44)
MCHC RBC-ENTMCNC: 26.6 PG — LOW (ref 27–34)
MCHC RBC-ENTMCNC: 33 % — SIGNIFICANT CHANGE UP (ref 32–36)
MCV RBC AUTO: 80.6 FL — SIGNIFICANT CHANGE UP (ref 80–100)
MONOCYTES # BLD AUTO: 1.15 K/UL — HIGH (ref 0–0.9)
MONOCYTES NFR BLD AUTO: 9.3 % — SIGNIFICANT CHANGE UP (ref 2–14)
NEUTROPHILS # BLD AUTO: 10.19 K/UL — HIGH (ref 1.8–7.4)
NEUTROPHILS NFR BLD AUTO: 82.7 % — HIGH (ref 43–77)
NRBC # FLD: 0.02 K/UL — SIGNIFICANT CHANGE UP (ref 0–0)
PLATELET # BLD AUTO: 172 K/UL — SIGNIFICANT CHANGE UP (ref 150–400)
PMV BLD: 10 FL — SIGNIFICANT CHANGE UP (ref 7–13)
POTASSIUM SERPL-MCNC: 4.8 MMOL/L — SIGNIFICANT CHANGE UP (ref 3.5–5.3)
POTASSIUM SERPL-SCNC: 4.8 MMOL/L — SIGNIFICANT CHANGE UP (ref 3.5–5.3)
RBC # BLD: 4.74 M/UL — SIGNIFICANT CHANGE UP (ref 4.2–5.8)
RBC # FLD: 14.6 % — HIGH (ref 10.3–14.5)
SODIUM SERPL-SCNC: 137 MMOL/L — SIGNIFICANT CHANGE UP (ref 135–145)
WBC # BLD: 12.32 K/UL — HIGH (ref 3.8–10.5)
WBC # FLD AUTO: 12.32 K/UL — HIGH (ref 3.8–10.5)

## 2019-04-02 PROCEDURE — 93010 ELECTROCARDIOGRAM REPORT: CPT

## 2019-04-02 PROCEDURE — 71045 X-RAY EXAM CHEST 1 VIEW: CPT | Mod: 26

## 2019-04-02 PROCEDURE — 99233 SBSQ HOSP IP/OBS HIGH 50: CPT

## 2019-04-02 RX ORDER — ACETAMINOPHEN 500 MG
1000 TABLET ORAL ONCE
Qty: 0 | Refills: 0 | Status: COMPLETED | OUTPATIENT
Start: 2019-04-02 | End: 2019-04-02

## 2019-04-02 RX ORDER — SODIUM CHLORIDE 9 MG/ML
1000 INJECTION, SOLUTION INTRAVENOUS
Qty: 0 | Refills: 0 | Status: DISCONTINUED | OUTPATIENT
Start: 2019-04-02 | End: 2019-04-03

## 2019-04-02 RX ORDER — DILTIAZEM HCL 120 MG
30 CAPSULE, EXT RELEASE 24 HR ORAL ONCE
Qty: 0 | Refills: 0 | Status: COMPLETED | OUTPATIENT
Start: 2019-04-02 | End: 2019-04-02

## 2019-04-02 RX ORDER — ONDANSETRON 8 MG/1
4 TABLET, FILM COATED ORAL EVERY 8 HOURS
Qty: 0 | Refills: 0 | Status: DISCONTINUED | OUTPATIENT
Start: 2019-04-02 | End: 2019-04-03

## 2019-04-02 RX ADMIN — HEPARIN SODIUM 5000 UNIT(S): 5000 INJECTION INTRAVENOUS; SUBCUTANEOUS at 05:36

## 2019-04-02 RX ADMIN — Medication 1: at 17:06

## 2019-04-02 RX ADMIN — SODIUM CHLORIDE 75 MILLILITER(S): 9 INJECTION, SOLUTION INTRAVENOUS at 05:35

## 2019-04-02 RX ADMIN — INSULIN GLARGINE 8 UNIT(S): 100 INJECTION, SOLUTION SUBCUTANEOUS at 21:58

## 2019-04-02 RX ADMIN — Medication 100 MILLIGRAM(S): at 14:58

## 2019-04-02 RX ADMIN — Medication 240 MILLIGRAM(S): at 05:36

## 2019-04-02 RX ADMIN — Medication 100 MILLIGRAM(S): at 05:36

## 2019-04-02 RX ADMIN — ONDANSETRON 4 MILLIGRAM(S): 8 TABLET, FILM COATED ORAL at 11:48

## 2019-04-02 RX ADMIN — HEPARIN SODIUM 5000 UNIT(S): 5000 INJECTION INTRAVENOUS; SUBCUTANEOUS at 21:11

## 2019-04-02 RX ADMIN — Medication 400 MILLIGRAM(S): at 21:12

## 2019-04-02 RX ADMIN — Medication 100 MILLIGRAM(S): at 21:12

## 2019-04-02 RX ADMIN — Medication 50 MILLIGRAM(S): at 06:30

## 2019-04-02 RX ADMIN — TRAMADOL HYDROCHLORIDE 50 MILLIGRAM(S): 50 TABLET ORAL at 10:30

## 2019-04-02 RX ADMIN — Medication 50 MILLIGRAM(S): at 17:06

## 2019-04-02 RX ADMIN — ATORVASTATIN CALCIUM 20 MILLIGRAM(S): 80 TABLET, FILM COATED ORAL at 21:11

## 2019-04-02 RX ADMIN — Medication 1000 MILLIGRAM(S): at 21:42

## 2019-04-02 RX ADMIN — Medication 30 MILLIGRAM(S): at 22:54

## 2019-04-02 RX ADMIN — SODIUM CHLORIDE 75 MILLILITER(S): 9 INJECTION, SOLUTION INTRAVENOUS at 07:12

## 2019-04-02 RX ADMIN — ONDANSETRON 4 MILLIGRAM(S): 8 TABLET, FILM COATED ORAL at 03:46

## 2019-04-02 RX ADMIN — Medication 400 MILLIGRAM(S): at 04:12

## 2019-04-02 RX ADMIN — TRAMADOL HYDROCHLORIDE 50 MILLIGRAM(S): 50 TABLET ORAL at 09:46

## 2019-04-02 RX ADMIN — HEPARIN SODIUM 5000 UNIT(S): 5000 INJECTION INTRAVENOUS; SUBCUTANEOUS at 14:58

## 2019-04-02 NOTE — PROGRESS NOTE ADULT - PROBLEM SELECTOR PLAN 1
-s/p right lap partial nephrectomy 4/1  -postop management per primary team, pain control, IVF, incentive spirometry, dvt ppx.

## 2019-04-02 NOTE — PROGRESS NOTE ADULT - PROBLEM SELECTOR PLAN 6
monitor renal fxn and U/O, last creat ~1.5 mg/dl  off toradol,   avoid nephrotoxins (nsaids, contrast), adjust meds per renal fxn.

## 2019-04-02 NOTE — PROGRESS NOTE ADULT - PROBLEM SELECTOR PLAN 7
c/w home meds, avoid hypotension, IVF, monitor BP  -fair control, changed toprol to lopressor 50 mg bid, if remains high SBP>160 despite adequate pain control, can give hydralazine 5 mg iv x1.

## 2019-04-02 NOTE — PROGRESS NOTE ADULT - PROBLEM SELECTOR PLAN 1
- CBC reviewed, BMP and INR pending  - IVF to maintenance  - OOB  - CLD  - await flatus  - hold anticoagulants presently, will start eliquis in the future - CBC reviewed, BMP and INR pending  - pain control -> IV tylenol  - IVF to maintenance  - OOB  - CLD  - await flatus  - TOV  - hold anticoagulants presently, will start eliquis in the future

## 2019-04-03 ENCOUNTER — TRANSCRIPTION ENCOUNTER (OUTPATIENT)
Age: 64
End: 2019-04-03

## 2019-04-03 VITALS
RESPIRATION RATE: 16 BRPM | HEART RATE: 106 BPM | SYSTOLIC BLOOD PRESSURE: 132 MMHG | DIASTOLIC BLOOD PRESSURE: 90 MMHG | OXYGEN SATURATION: 100 % | TEMPERATURE: 99 F

## 2019-04-03 LAB
GLUCOSE BLDC GLUCOMTR-MCNC: 146 MG/DL — HIGH (ref 70–99)
HCT VFR BLD CALC: 37.4 % — LOW (ref 39–50)
HGB BLD-MCNC: 12.4 G/DL — LOW (ref 13–17)
MCHC RBC-ENTMCNC: 26.6 PG — LOW (ref 27–34)
MCHC RBC-ENTMCNC: 33.2 % — SIGNIFICANT CHANGE UP (ref 32–36)
MCV RBC AUTO: 80.1 FL — SIGNIFICANT CHANGE UP (ref 80–100)
NRBC # FLD: 0 K/UL — SIGNIFICANT CHANGE UP (ref 0–0)
PLATELET # BLD AUTO: 160 K/UL — SIGNIFICANT CHANGE UP (ref 150–400)
PMV BLD: 10.1 FL — SIGNIFICANT CHANGE UP (ref 7–13)
RBC # BLD: 4.67 M/UL — SIGNIFICANT CHANGE UP (ref 4.2–5.8)
RBC # FLD: 14.6 % — HIGH (ref 10.3–14.5)
WBC # BLD: 11.72 K/UL — HIGH (ref 3.8–10.5)
WBC # FLD AUTO: 11.72 K/UL — HIGH (ref 3.8–10.5)

## 2019-04-03 PROCEDURE — 99233 SBSQ HOSP IP/OBS HIGH 50: CPT

## 2019-04-03 RX ORDER — TRAMADOL HYDROCHLORIDE 50 MG/1
1 TABLET ORAL
Qty: 8 | Refills: 0 | OUTPATIENT
Start: 2019-04-03 | End: 2019-04-04

## 2019-04-03 RX ORDER — WARFARIN SODIUM 2.5 MG/1
1 TABLET ORAL
Qty: 0 | Refills: 0 | COMMUNITY

## 2019-04-03 RX ADMIN — HEPARIN SODIUM 5000 UNIT(S): 5000 INJECTION INTRAVENOUS; SUBCUTANEOUS at 05:05

## 2019-04-03 RX ADMIN — Medication 50 MILLIGRAM(S): at 05:05

## 2019-04-03 RX ADMIN — ONDANSETRON 4 MILLIGRAM(S): 8 TABLET, FILM COATED ORAL at 04:39

## 2019-04-03 RX ADMIN — Medication 240 MILLIGRAM(S): at 05:05

## 2019-04-03 RX ADMIN — TRAMADOL HYDROCHLORIDE 50 MILLIGRAM(S): 50 TABLET ORAL at 04:25

## 2019-04-03 RX ADMIN — Medication 100 MILLIGRAM(S): at 05:05

## 2019-04-03 RX ADMIN — TRAMADOL HYDROCHLORIDE 50 MILLIGRAM(S): 50 TABLET ORAL at 03:55

## 2019-04-03 NOTE — DISCHARGE NOTE PROVIDER - HOSPITAL COURSE
Pt had R. lap partial neph 2 days ago; did well; ambulating; voiding well; last nite had some tachycardia, but EKG showed his usual A-fib; rate better    today; Pt had R. lap partial neph 2 days ago; did well; ambulating; voiding well; last nite had some tachycardia, but EKG showed his usual A-fib; rate better    today; passed gas and berta reg diet; voiding well; will start eliquis next Monday; home today

## 2019-04-03 NOTE — DISCHARGE NOTE NURSING/CASE MANAGEMENT/SOCIAL WORK - NSDCPNINST_GEN_ALL_CORE
Make a follow up appointment with Dr. Michelle. Call MD if you develop a fever, or if there is redness, swelling, drainage or pain not relieved by pain medication. No heavy lifting, bending, or straining to move your bowels. Take over the counter stool softeners as needed to prevent constipation which may be caused by pain medication. Your A1C= 6.3. Continue to follow a consistent carbohydrate diet and take your medications for diaetes. Make a follow up appointment with Dr. Michelle. Call MD if you develop a fever, or if there is redness, swelling, drainage or pain not relieved by pain medication. No heavy lifting, bending, or straining to move your bowels. Take over the counter stool softeners as needed to prevent constipation which may be caused by pain medication. Your A1C= 6.3. Continue to follow a consistent carbohydrate diet and take your medications for diaetes. Refer to Literature regarding Discharge medications as well as side effects, including Eliquis.

## 2019-04-03 NOTE — DISCHARGE NOTE PROVIDER - NSDCACTIVITY_GEN_ALL_CORE
Stairs allowed/Walking - Indoors allowed/Walking - Outdoors allowed/No heavy lifting/straining/Showering allowed

## 2019-04-03 NOTE — DISCHARGE NOTE NURSING/CASE MANAGEMENT/SOCIAL WORK - NSDCPEPTCOWAR_GEN_ALL_CORE
Coumadin/Warfarin - Compliance/Coumadin/Warfarin - Dietary Advice/Coumadin/Warfarin - Potential for adverse drug reactions and interactions/Coumadin/Warfarin - Follow up monitoring

## 2019-04-03 NOTE — PROGRESS NOTE ADULT - SUBJECTIVE AND OBJECTIVE BOX
Florina Chiu MD  Pager [phone]    CHIEF COMPLAINT: Patient is a 63y old  male who presents with a chief complaint of right lap partial nephrectomy (01 Apr 2019 15:37)      SUBJECTIVE / OVERNIGHT EVENTS:  pt feels ok, passed flatus, no chest pain/sob/dizziness, diet advanced to regular     MEDICATIONS  (STANDING):  atorvastatin 20 milliGRAM(s) Oral at bedtime  dextrose 5% + sodium chloride 0.45%. 1000 milliLiter(s) (75 mL/Hr) IV Continuous <Continuous>  dextrose 5%. 1000 milliLiter(s) (50 mL/Hr) IV Continuous <Continuous>  dextrose 50% Injectable 12.5 Gram(s) IV Push once  dextrose 50% Injectable 25 Gram(s) IV Push once  dextrose 50% Injectable 25 Gram(s) IV Push once  diltiazem    milliGRAM(s) Oral daily  docusate sodium 100 milliGRAM(s) Oral three times a day  heparin  Injectable 5000 Unit(s) SubCutaneous every 8 hours  insulin glargine Injectable (LANTUS) 8 Unit(s) SubCutaneous at bedtime  insulin lispro (HumaLOG) corrective regimen sliding scale   SubCutaneous three times a day before meals  insulin lispro (HumaLOG) corrective regimen sliding scale   SubCutaneous at bedtime  metoprolol tartrate 50 milliGRAM(s) Oral two times a day    MEDICATIONS  (PRN):  dextrose 40% Gel 15 Gram(s) Oral once PRN Blood Glucose LESS THAN 70 milliGRAM(s)/deciliter  glucagon  Injectable 1 milliGRAM(s) IntraMuscular once PRN Glucose LESS THAN 70 milligrams/deciliter  ondansetron Injectable 4 milliGRAM(s) IV Push every 8 hours PRN Nausea and/or Vomiting  senna 2 Tablet(s) Oral at bedtime PRN Constipation  traMADol 50 milliGRAM(s) Oral every 6 hours PRN Severe Pain (7 - 10)  traMADol 25 milliGRAM(s) Oral every 4 hours PRN Moderate Pain (4 - 6)      VITALS:  T(F): 98.5 (04-02-19 @ 09:45), Max: 98.8 (04-02-19 @ 05:31)  HR: 105 (04-02-19 @ 09:45) (60 - 105)  BP: 155/94 (04-02-19 @ 09:45) (143/94 - 168/99)  RR: 16 (04-02-19 @ 05:31) (11 - 20)  SpO2: 97% (04-02-19 @ 09:45)      CAPILLARY BLOOD GLUCOSE    Output     I&O's Summary  T(F): 98.5 (04-02-19 @ 09:45), Max: 98.8 (04-02-19 @ 05:31)  HR: 105 (04-02-19 @ 09:45) (60 - 105)  BP: 155/94 (04-02-19 @ 09:45) (143/94 - 168/99)  RR: 16 (04-02-19 @ 05:31) (11 - 20)  SpO2: 97% (04-02-19 @ 09:45)    PHYSICAL EXAM:  GENERAL: NAD, well-developed  HEAD:  Atraumatic, Normocephalic  EYES: EOMI, PERRLA, conjunctiva and sclera clear  NECK: Supple, No JVD  CHEST/LUNG: Clear to auscultation bilaterally; No wheeze  HEART: irregularly irregular; No murmurs, rubs, or gallops  ABDOMEN: Soft, incisional tenderness, wound c/d/i; mildly distended; Bowel sounds present  : salvador  EXTREMITIES:  2+ Peripheral Pulses, No clubbing, cyanosis, or edema  PSYCH: AAOx3  NEUROLOGY: non-focal  SKIN: No rashes or lesions    LABS:              12.6                 137  | 24   | 17           12.32 >-----------< 172     ------------------------< 119                   38.2                 4.8  | 100  | 1.48                                         Ca 9.0   Mg x     Ph x            MICROBIOLOGY:    RADIOLOGY & ADDITIONAL TESTS:    Imaging Personally Reviewed:    [ ] Consultant(s) Notes Reviewed:  [ x] Care Discussed with Consultants/Other Providers: urology prashanth Odonnell when cleared by urology
Florina Chiu MD  Pager [phone]    CHIEF COMPLAINT: Patient is a 63y old  male who presents with a chief complaint of renal mass (03 Apr 2019 09:41)      SUBJECTIVE / OVERNIGHT EVENTS:  pt had fever 100.7F and brief tachycardia  last night, this morning rate controlled, denies chest pain/sob/dizziness, going home today    MEDICATIONS  (STANDING):  atorvastatin 20 milliGRAM(s) Oral at bedtime  dextrose 5%. 1000 milliLiter(s) (50 mL/Hr) IV Continuous <Continuous>  dextrose 50% Injectable 12.5 Gram(s) IV Push once  dextrose 50% Injectable 25 Gram(s) IV Push once  dextrose 50% Injectable 25 Gram(s) IV Push once  diltiazem    milliGRAM(s) Oral daily  docusate sodium 100 milliGRAM(s) Oral three times a day  heparin  Injectable 5000 Unit(s) SubCutaneous every 8 hours  insulin glargine Injectable (LANTUS) 8 Unit(s) SubCutaneous at bedtime  insulin lispro (HumaLOG) corrective regimen sliding scale   SubCutaneous three times a day before meals  insulin lispro (HumaLOG) corrective regimen sliding scale   SubCutaneous at bedtime  metoprolol tartrate 50 milliGRAM(s) Oral two times a day    MEDICATIONS  (PRN):  dextrose 40% Gel 15 Gram(s) Oral once PRN Blood Glucose LESS THAN 70 milliGRAM(s)/deciliter  glucagon  Injectable 1 milliGRAM(s) IntraMuscular once PRN Glucose LESS THAN 70 milligrams/deciliter  ondansetron Injectable 4 milliGRAM(s) IV Push every 8 hours PRN Nausea and/or Vomiting  senna 2 Tablet(s) Oral at bedtime PRN Constipation  traMADol 50 milliGRAM(s) Oral every 6 hours PRN Severe Pain (7 - 10)  traMADol 25 milliGRAM(s) Oral every 4 hours PRN Moderate Pain (4 - 6)      VITALS:  T(F): 98.6 (04-03-19 @ 09:45), Max: 100.7 (04-02-19 @ 20:37)  HR: 100 (04-03-19 @ 09:45) (92 - 112)  BP: 132/90 (04-03-19 @ 09:45) (132/90 - 158/80)  RR: 16 (04-03-19 @ 09:45) (15 - 16)  SpO2: 100% (04-03-19 @ 09:45)      CAPILLARY BLOOD GLUCOSE    Output     I&O's Summary  T(F): 98.6 (04-03-19 @ 09:45), Max: 100.7 (04-02-19 @ 20:37)  HR: 100 (04-03-19 @ 09:45) (92 - 112)  BP: 132/90 (04-03-19 @ 09:45) (132/90 - 158/80)  RR: 16 (04-03-19 @ 09:45) (15 - 16)  SpO2: 100% (04-03-19 @ 09:45)    PHYSICAL EXAM:  GENERAL: NAD, well-developed  HEAD:  Atraumatic, Normocephalic  EYES: EOMI, PERRLA, conjunctiva and sclera clear  NECK: Supple, No JVD  CHEST/LUNG: Clear to auscultation bilaterally; No wheeze  HEART: irregularly irregular; No murmurs, rubs, or gallops  ABDOMEN: Soft, NT/ND, wound c/d/i; mildly distended; Bowel sounds present  EXTREMITIES:  2+ Peripheral Pulses, No clubbing, cyanosis, or edema  PSYCH: AAOx3  NEUROLOGY: non-focal  SKIN: No rashes or lesions  LABS:              12.4                 x    | x    | x            11.72 >-----------< 160     ------------------------< x                     37.4                 x    | x    | x                                            Ca x     Mg x     Ph x              MICROBIOLOGY:    RADIOLOGY & ADDITIONAL TESTS:    Imaging Personally Reviewed:    [ ] Consultant(s) Notes Reviewed:  [X ] Care Discussed with Consultants/Other Providers: Urology rosa isela Odonnell 1 week after surgery
Post op check    This  64 yo M is s/p R. josee partial neph  PMH: A-fib; hep C; cardiomyopathy; HTN; hx L. partial neph    Pt is awake and alert  Has no c/o  Afeb 160/104  89  98%RA; pt took his cardizem and toprol this AM  Pain score 5/10  Abd- soft; appropriately tender             wounds C&D  Luo clear 420
Subjective    Overnight event noted, pain now better controlled, passing flatus, tolerating PO, getting OOB w/o issue.    Objective    Vital signs  T(F): , Max: 100.7 (04-02-19 @ 20:37)  HR: 99 (04-03-19 @ 05:02)  BP: 158/80 (04-03-19 @ 05:02)  SpO2: 94% (04-03-19 @ 05:02)  Wt(kg): --    Output     OUT:    Indwelling Catheter - Urethral: 1970 mL  Total OUT: 1970 mL    Total NET: -1970 mL      OUT:    Voided: 3250 mL  Total OUT: 3250 mL    Total NET: -3250 mL          Gen NAD  Abd S/mild distension/NT   no flank tenderness, no suprapubic tenderness    Labs      04-02 @ 05:45    WBC 12.32 / Hct 38.2  / SCr 1.48     Imaging    CXR: poor study, unable to assess lungs
Subjective    pt states he feels well, mild nausea w/ clears yesterday but now resolved, pain controlled, no flatus, no OOB.    Objective    Vital signs  T(F): , Max: 98.8 (04-01-19 @ 08:11)  HR: 100 (04-02-19 @ 05:31)  BP: 156/90 (04-02-19 @ 05:31)  SpO2: 95% (04-02-19 @ 05:31)  Wt(kg): --    Output     OUT:    Indwelling Catheter - Urethral: 1970 mL  Total OUT: 1970 mL    Total NET: -1970 mL          Gen NAD  Abd S/mild distension/appropriately tender   Luo clear yellow    Labs      04-02 @ 05:45    WBC 12.32 / Hct 38.2  / SCr --

## 2019-04-03 NOTE — DISCHARGE NOTE NURSING/CASE MANAGEMENT/SOCIAL WORK - NSDCDPATPORTLINK_GEN_ALL_CORE
You can access the [facility] Patient Portal, offered by [facility], by registering with the following website: [email or web address]

## 2019-04-03 NOTE — DISCHARGE NOTE NURSING/CASE MANAGEMENT/SOCIAL WORK - NSDCPNDISPN_GEN_ALL_CORE
Safe use, storage and disposal of opioids when prescribed/Side effects of pain management treatment/Education provided on the pain management plan of care

## 2019-04-03 NOTE — PROGRESS NOTE ADULT - PROBLEM SELECTOR PLAN 3
-coumadin held preop, pt's cardiologist wants to switch to NOAC (eliquis or xarelto) postop when cleared by urology  -c/w lopressor  and cardizem for rate control with holding parameters  -prior echo in Dec 2016 showed normal LVEF 60-65%,  -preop stress test achieved 7 METS, no myocardial ischemia.
-off coumadin, start eliquis 5 mg bid 1 week from surgery per urology  -resume toprol and cardizem on discharge  -pt advised to f/u with his cardiologist and check his renal fxn, if creat creeping up may have to reduce dose to 2.5 mg bid  -prior echo in Dec 2016 showed normal LVEF 60-65%,  -preop stress test achieved 7 METS, no myocardial ischemia.

## 2019-04-03 NOTE — PROGRESS NOTE ADULT - ASSESSMENT
64 y/o male with h/o HTN, MARIANA on CPAP, CM, Afib on coumadin, DM2, HCV (treated 10years ago), prior left partial nephrectomy in 2018 due to RCC, right renal mass, now s/p right lap partial nephrectomy on 4/1/19
62y/o M s/p R lap HALLEYx PDO#1 presently stable
62y/o M s/p R lap PNx PDO#2 presently stable
64 y/o male with h/o HTN, MARIANA on CPAP, CM, Afib on coumadin, DM2, HCV (treated 10years ago), prior left partial nephrectomy in 2018 due to RCC, right renal mass, now s/p right lap partial nephrectomy on 4/1/19
s/p R. lap partial neph, now hypertensive    Plan:    Pain control  If B/P remains 160 will give hydralazine  Change Lopressor 50 bid  Clears until flatus   AM labs

## 2019-04-03 NOTE — PROGRESS NOTE ADULT - PROBLEM SELECTOR PLAN 1
- check CBC today to assure stable or downtrending white count  - pain control -> IV tylenol, narcotics as rescue  - D/C IVF  - OOB  - regular diet  - Eliquis to be started 1 week from surgery  - possible D/C today if pt is well - postoperative fever unlikely to be infectious, favor SIRS / ateleectasis at this point  - check CBC today to assure stable or downtrending white count  - pain control -> IV tylenol, narcotics as rescue  - D/C IVF  - OOB  - regular diet  - Eliquis to be started 1 week from surgery  - possible D/C today if pt is well

## 2019-04-03 NOTE — DISCHARGE NOTE NURSING/CASE MANAGEMENT/SOCIAL WORK - NSDPDISTO_GEN_ALL_CORE
Home/Pt. is afebrile and offers no complaints. In no acute distress. Abdominal scope sites open to air: clean, dry and intact. Pt is ambulating, tolerating diet well, and voiding in adequate amounts.

## 2019-04-03 NOTE — PROGRESS NOTE ADULT - PROBLEM SELECTOR PLAN 4
on lantus 16 units hs at home  -cut down to 1/2 dose = 8 units hs for now, ISS, monitor FS  -A1c 6.3%.
on lantus 16 units hs at home  -should be discharged on lantus 8 units hs, f/u PCP  -A1c 6.3%.

## 2019-04-03 NOTE — CHART NOTE - NSCHARTNOTEFT_GEN_A_CORE
Overnight Event    Patient developed temp of 100.7 with HR of 112 and BP of 146/103 around 8:30pm.  Patient was evaluated at that time.    Patient reported he was in pain.  He also admitted to having palpitations, which is chronic for him, reports he is chronically in Afib.     On exam his heart is irregularly irregular  Lungs are clear    -EKG performed showing Afib with RVR HR to 119  -IV tylenol given for temp and pain  -PO Cardizem 30mg given for HR and BP  -CXR ordered.  -Patient remained stable throughout the rest of the night, HR improved, ranging from .  -Discussed with Dr. Castro.

## 2019-04-03 NOTE — PROGRESS NOTE ADULT - PROBLEM SELECTOR PLAN 1
-s/p right lap partial nephrectomy 4/1  -progressing well postop, had fever 100.7F last night could be atelectasis postop, tolerating diet, d/c home today per primary team

## 2019-04-03 NOTE — DISCHARGE NOTE PROVIDER - NSDCCPTREATMENT_GEN_ALL_CORE_FT
PRINCIPAL PROCEDURE  Procedure: Laparoscopic partial nephrectomy  Findings and Treatment: Right side

## 2019-04-03 NOTE — DISCHARGE NOTE NURSING/CASE MANAGEMENT/SOCIAL WORK - NSDCPECAREGIVERED_GEN_ALL_CORE
Lap partial nephrectomy, diabetes, coumadin, percocet/No Lap partial nephrectomy, diabetes, coumadin/No Lap partial nephrectomy, diabetes, Eliquis/No

## 2019-04-03 NOTE — DISCHARGE NOTE PROVIDER - CARE PROVIDER_API CALL
Matt Michelle)  Urology  [address] [place] [address]  Phone: [phone]  Fax: [phone]  Follow Up Time: 2 weeks

## 2019-04-03 NOTE — DISCHARGE NOTE PROVIDER - NSDCCPCAREPLAN_GEN_ALL_CORE_FT
PRINCIPAL DISCHARGE DIAGNOSIS  Diagnosis: Right renal mass  Assessment and Plan of Treatment: Drink plenty of fluids.  No heavy lifting (greater than 10 pounds) or straining for 4 to 6 weeks.  You may shower, just pat white strips dry, they will fall off in a few weeks. Do not drive when taking pain medication. We  recommend you lower Lantus to 8u until you see your PCP.  Fill your Eliquis Rx and you may start taking it next Monday April 8  Call the office if you have fever greater than 101, difficulty urinating, pain not relieved with pain medication, nausea/vomiting.

## 2019-04-05 ENCOUNTER — RESULT REVIEW (OUTPATIENT)
Age: 64
End: 2019-04-05

## 2019-04-10 ENCOUNTER — APPOINTMENT (OUTPATIENT)
Dept: INTERNAL MEDICINE | Facility: CLINIC | Age: 64
End: 2019-04-10
Payer: COMMERCIAL

## 2019-04-10 VITALS
HEIGHT: 70 IN | SYSTOLIC BLOOD PRESSURE: 130 MMHG | WEIGHT: 228 LBS | OXYGEN SATURATION: 98 % | HEART RATE: 78 BPM | DIASTOLIC BLOOD PRESSURE: 85 MMHG | BODY MASS INDEX: 32.64 KG/M2

## 2019-04-10 LAB — SURGICAL PATHOLOGY STUDY: SIGNIFICANT CHANGE UP

## 2019-04-10 PROCEDURE — 36415 COLL VENOUS BLD VENIPUNCTURE: CPT

## 2019-04-10 PROCEDURE — 99495 TRANSJ CARE MGMT MOD F2F 14D: CPT | Mod: 25

## 2019-04-10 RX ORDER — ENOXAPARIN SODIUM 150 MG/ML
120 INJECTION SUBCUTANEOUS
Qty: 20 | Refills: 1 | Status: DISCONTINUED | COMMUNITY
Start: 2018-03-20 | End: 2019-04-10

## 2019-04-11 ENCOUNTER — TRANSCRIPTION ENCOUNTER (OUTPATIENT)
Age: 64
End: 2019-04-11

## 2019-04-11 ENCOUNTER — RESULT REVIEW (OUTPATIENT)
Age: 64
End: 2019-04-11

## 2019-04-11 LAB
ALBUMIN SERPL ELPH-MCNC: 4.1 G/DL
ALP BLD-CCNC: 103 U/L
ALT SERPL-CCNC: 36 U/L
ANION GAP SERPL CALC-SCNC: 15 MMOL/L
AST SERPL-CCNC: 23 U/L
BASOPHILS # BLD AUTO: 0.05 K/UL
BASOPHILS NFR BLD AUTO: 0.5 %
BILIRUB SERPL-MCNC: 0.2 MG/DL
BUN SERPL-MCNC: 28 MG/DL
CALCIUM SERPL-MCNC: 9.7 MG/DL
CHLORIDE SERPL-SCNC: 103 MMOL/L
CO2 SERPL-SCNC: 23 MMOL/L
CREAT SERPL-MCNC: 1.5 MG/DL
EOSINOPHIL # BLD AUTO: 0.18 K/UL
EOSINOPHIL NFR BLD AUTO: 1.7 %
GLUCOSE SERPL-MCNC: 208 MG/DL
HCT VFR BLD CALC: 44.3 %
HGB BLD-MCNC: 13.8 G/DL
IMM GRANULOCYTES NFR BLD AUTO: 0.8 %
LYMPHOCYTES # BLD AUTO: 1.43 K/UL
LYMPHOCYTES NFR BLD AUTO: 13.2 %
MAN DIFF?: NORMAL
MCHC RBC-ENTMCNC: 26.7 PG
MCHC RBC-ENTMCNC: 31.2 GM/DL
MCV RBC AUTO: 85.9 FL
MONOCYTES # BLD AUTO: 0.83 K/UL
MONOCYTES NFR BLD AUTO: 7.6 %
NEUTROPHILS # BLD AUTO: 8.27 K/UL
NEUTROPHILS NFR BLD AUTO: 76.2 %
PLATELET # BLD AUTO: 335 K/UL
POTASSIUM SERPL-SCNC: 4.8 MMOL/L
PROT SERPL-MCNC: 7.7 G/DL
RBC # BLD: 5.16 M/UL
RBC # FLD: 14.3 %
SODIUM SERPL-SCNC: 141 MMOL/L
WBC # FLD AUTO: 10.85 K/UL

## 2019-04-11 NOTE — HISTORY OF PRESENT ILLNESS
[Post-hospitalization from ___ Hospital] : Post-hospitalization from [unfilled] Hospital [FreeTextEntry2] : Patient presents status post admission to [facility]. Patient was admitted on 4/1 for elective right laparoscopic partial nephrectomy secondary to renal mass. Patient had no perioperative or postoperative complications. Pathology is still pending but pt states "it was a cancer". Patient's Coumadin was changed to eliquis per cardiology therefore does not require INR check."feel ok"

## 2019-04-11 NOTE — ASSESSMENT
[FreeTextEntry1] : Labs sent out.Patient advised to continue present medications with diet and exercise. Patient will follow up with specialists as scheduled. Patient will return to the office in 4 weeks w/pneumovax/ CPE in june\par \par \par Dr. Alvares was present in office building while I examined pt\par \par 24-hour urine was done 1/2019-sent to renal- Dr. Gong \par specialists include..\par 1. cardiology-Dr. Eckert\par 2. ophthalmology-Dr.Goldberg-10/2018\par 3. podiatry-NO LONGER GOES\par 4. apnea doctor-Dr. Live\par 5. gastro-"-Dr. Sanchez\par 6.-Dr. Michelle\par 7.Endocrine-Dr. Nino\par 8.renal-Dr. Gong\par colonoscopy 3/18\par abdominal sonogram=6/17\par discussed shingles vaccine/to get pneumovax next\par carotid sonogram was 11/16 with +plaque,no stenosis (told pt to check with cardio regarding follow up sono)\par Echo was 5/2018\par Chest x-ray was done 5/4/18 showing small left effusion=repeat 2/2018=clear\par Hep C screen has been done in the past\par CT lung screening not applicable as patient quit greater than 30 years ago\par CT lung done on 2/2018 shows pulmonary nodules therefore CT ordered as followup

## 2019-04-11 NOTE — PHYSICAL EXAM
[No Acute Distress] : no acute distress [Clear to Auscultation] : lungs were clear to auscultation bilaterally [No Respiratory Distress] : no respiratory distress  [Normal Affect] : the affect was normal [Normal Mood] : the mood was normal [de-identified] : +Irreg with CVR [de-identified] : +steri strips right lower abd with bruising noted

## 2019-04-17 ENCOUNTER — APPOINTMENT (OUTPATIENT)
Dept: ENDOCRINOLOGY | Facility: CLINIC | Age: 64
End: 2019-04-17
Payer: COMMERCIAL

## 2019-04-17 VITALS
WEIGHT: 228 LBS | SYSTOLIC BLOOD PRESSURE: 124 MMHG | HEIGHT: 70 IN | BODY MASS INDEX: 32.64 KG/M2 | DIASTOLIC BLOOD PRESSURE: 78 MMHG | HEART RATE: 71 BPM

## 2019-04-17 PROCEDURE — 99214 OFFICE O/P EST MOD 30 MIN: CPT

## 2019-04-17 NOTE — ASSESSMENT
[Carbohydrate Consistent Diet] : carbohydrate consistent diet [Long Term Vascular Complications] : long term vascular complications of diabetes [Importance of Diet and Exercise] : importance of diet and exercise to improve glycemic control, achieve weight loss and improve cardiovascular health [Self Monitoring of Blood Glucose] : self monitoring of blood glucose [FreeTextEntry1] : DM2 much better controlled a1c 6.4 with nephropathy, HTN , HLD , CHENEY. \par continue for now glipizide XL 10 mg BID \par continue tresiba 18 u HS \par start huamlog 2 u TID w meals since after meals seems to have 200 and above Bgs \par discussed diet and exercise\par encouraged more exercise walking 30 min 3 x week\par eye exam Nov 2018: reportedly no DR \par Bgs 3x day \par GFR 49\par has some microalbuminuria .he needs ACEI or ARB \par he wants to come off insulin but not sure if able to. metformin a little rleuatcant since CKD.\par \par \par HTN:  bp at target on meds. Continue current management. \par \par HLD: TG down to 133 since Dm corrected \par continue crestor 5 mg qd. \par \par obesity:  \par encouraged more exercise walking 30 min 3 x week\par discussed diet and exercise\par \par

## 2019-04-17 NOTE — PHYSICAL EXAM
[Alert] : alert [No Acute Distress] : no acute distress [Well Nourished] : well nourished [Well Developed] : well developed [Normal Sclera/Conjunctiva] : normal sclera/conjunctiva [EOMI] : extra ocular movement intact [No Proptosis] : no proptosis [Normal Oropharynx] : the oropharynx was normal [Thyroid Not Enlarged] : the thyroid was not enlarged [No Thyroid Nodules] : there were no palpable thyroid nodules [No Respiratory Distress] : no respiratory distress [No Accessory Muscle Use] : no accessory muscle use [Clear to Auscultation] : lungs were clear to auscultation bilaterally [Normal Rate] : heart rate was normal  [Normal S1, S2] : normal S1 and S2 [Regular Rhythm] : with a regular rhythm [Pedal Pulses Normal] : the pedal pulses are present [No Edema] : there was no peripheral edema [Normal Bowel Sounds] : normal bowel sounds [Not Tender] : non-tender [Soft] : abdomen soft [Not Distended] : not distended [Post Cervical Nodes] : posterior cervical nodes [Anterior Cervical Nodes] : anterior cervical nodes [Spine Straight] : spine straight [No Stigmata of Cushings Syndrome] : no stigmata of cushings syndrome [Normal] : normal and non tender [Normal Gait] : normal gait [Normal Strength/Tone] : muscle strength and tone were normal [Normal Reflexes] : deep tendon reflexes were 2+ and symmetric [No Rash] : no rash [Oriented x3] : oriented to person, place, and time [No Tremors] : no tremors [Acanthosis Nigricans] : no acanthosis nigricans [de-identified] : necrobiosis lipoidica both calves

## 2019-04-17 NOTE — HISTORY OF PRESENT ILLNESS
[FreeTextEntry1] : DM2 . HTN, HLD. 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 \par He had partial nephrectomy for renal carcinoma. \par He takes glipizide 10 mg BID \par Takes tresiba 16u  HS \par bgs am 115- 140   after dinner 200-230\par no hypoG

## 2019-04-21 ENCOUNTER — FORM ENCOUNTER (OUTPATIENT)
Age: 64
End: 2019-04-21

## 2019-04-22 ENCOUNTER — APPOINTMENT (OUTPATIENT)
Dept: CT IMAGING | Facility: CLINIC | Age: 64
End: 2019-04-22
Payer: COMMERCIAL

## 2019-04-22 ENCOUNTER — OUTPATIENT (OUTPATIENT)
Dept: OUTPATIENT SERVICES | Facility: HOSPITAL | Age: 64
LOS: 1 days | End: 2019-04-22
Payer: COMMERCIAL

## 2019-04-22 DIAGNOSIS — R91.8 OTHER NONSPECIFIC ABNORMAL FINDING OF LUNG FIELD: ICD-10-CM

## 2019-04-22 DIAGNOSIS — Z90.5 ACQUIRED ABSENCE OF KIDNEY: Chronic | ICD-10-CM

## 2019-04-22 DIAGNOSIS — I48.91 UNSPECIFIED ATRIAL FIBRILLATION: Chronic | ICD-10-CM

## 2019-04-22 DIAGNOSIS — Z98.890 OTHER SPECIFIED POSTPROCEDURAL STATES: Chronic | ICD-10-CM

## 2019-04-22 PROCEDURE — 71250 CT THORAX DX C-: CPT

## 2019-04-22 PROCEDURE — 71250 CT THORAX DX C-: CPT | Mod: 26

## 2019-04-23 ENCOUNTER — APPOINTMENT (OUTPATIENT)
Dept: UROLOGY | Facility: CLINIC | Age: 64
End: 2019-04-23
Payer: COMMERCIAL

## 2019-04-23 PROCEDURE — 99024 POSTOP FOLLOW-UP VISIT: CPT

## 2019-04-23 NOTE — PHYSICAL EXAM
[Edema] : no peripheral edema [Exaggerated Use Of Accessory Muscles For Inspiration] : no accessory muscle use [] : no respiratory distress [No Focal Deficits] : no focal deficits [Normal Station and Gait] : the gait and station were normal for the patient's age [FreeTextEntry1] : no JVD

## 2019-04-23 NOTE — ASSESSMENT
[FreeTextEntry1] : 62 y/o M h/o RCC, s/p L lap PNx w/ ccRCC FG2, now s/p R lap PNx w/ pap T2 FG2\par - will need interval imaging in 6 months with US

## 2019-04-23 NOTE — HISTORY OF PRESENT ILLNESS
[FreeTextEntry1] : 64 y/o M s/p L lap PNx ccRCC FG2 pT1a 2018, now s/p R lap PNx pap T2 RCC 2019. Patient states he is eating well, recovery is better than before, no constipation.\par \par Denies F/C, N/V, CP, SOB, abd pain, dysuria, hematuria.

## 2019-04-25 ENCOUNTER — RESULT REVIEW (OUTPATIENT)
Age: 64
End: 2019-04-25

## 2019-04-25 ENCOUNTER — TRANSCRIPTION ENCOUNTER (OUTPATIENT)
Age: 64
End: 2019-04-25

## 2019-05-02 ENCOUNTER — RX RENEWAL (OUTPATIENT)
Age: 64
End: 2019-05-02

## 2019-05-14 ENCOUNTER — APPOINTMENT (OUTPATIENT)
Dept: INTERNAL MEDICINE | Facility: CLINIC | Age: 64
End: 2019-05-14
Payer: COMMERCIAL

## 2019-05-14 VITALS
OXYGEN SATURATION: 98 % | HEART RATE: 61 BPM | DIASTOLIC BLOOD PRESSURE: 85 MMHG | SYSTOLIC BLOOD PRESSURE: 125 MMHG | WEIGHT: 228 LBS | HEIGHT: 70 IN | BODY MASS INDEX: 32.64 KG/M2

## 2019-05-14 PROCEDURE — 99213 OFFICE O/P EST LOW 20 MIN: CPT | Mod: 25

## 2019-05-14 PROCEDURE — 36415 COLL VENOUS BLD VENIPUNCTURE: CPT

## 2019-05-14 NOTE — PHYSICAL EXAM
[General Appearance - In No Acute Distress] : in no acute distress [] : no respiratory distress [Auscultation Breath Sounds / Voice Sounds] : lungs were clear to auscultation bilaterally [Respiration, Rhythm And Depth] : normal respiratory rhythm and effort [Affect] : the affect was normal [Mood] : the mood was normal [FreeTextEntry1] : + irreg with CVR

## 2019-05-14 NOTE — ASSESSMENT
[FreeTextEntry1] : Labs sent out.Patient advised to continue present medications with diet and exercise. Patient will follow up with specialists as scheduled. Patient will return to the office in 4 weeks for CPE as sched\par \par \par 24-hour urine was done 1/2019-sent to renal- Dr. Gong \par specialists include..\par 1. cardiology-Dr. Eckert\par 2. ophthalmology-Dr.Goldberg-10/2018\par 3. podiatry-NO LONGER GOES\par 4. apnea doctor-Dr. Live\par 5. gastro-"-Dr. Sanchez\par 6.-Dr. Michelle\par 7.Endocrine-Dr. Nino\par 8.renal-Dr. Gong\par colonoscopy 3/18\par abdominal sonogram=6/17\par discussed shingles vaccine/Declines pneumovax\par carotid sonogram was 11/16 with +plaque,no stenosis (told pt to check with cardio regarding follow up sono)\par Echo was 5/2018\par Chest x-ray was done 5/4/18 showing small left effusion=repeat 2/2018=clear\par Hep C screen has been done in the past\par CT lung screening not applicable as patient quit greater than 30 years ago\par CT lung done on 4/2019

## 2019-05-15 ENCOUNTER — RESULT REVIEW (OUTPATIENT)
Age: 64
End: 2019-05-15

## 2019-05-15 ENCOUNTER — TRANSCRIPTION ENCOUNTER (OUTPATIENT)
Age: 64
End: 2019-05-15

## 2019-05-15 LAB
ANION GAP SERPL CALC-SCNC: 11 MMOL/L
BASOPHILS # BLD AUTO: 0.03 K/UL
BASOPHILS NFR BLD AUTO: 0.4 %
BUN SERPL-MCNC: 20 MG/DL
CALCIUM SERPL-MCNC: 9.7 MG/DL
CHLORIDE SERPL-SCNC: 107 MMOL/L
CO2 SERPL-SCNC: 24 MMOL/L
CREAT SERPL-MCNC: 1.46 MG/DL
EOSINOPHIL # BLD AUTO: 0.1 K/UL
EOSINOPHIL NFR BLD AUTO: 1.3 %
GLUCOSE SERPL-MCNC: 230 MG/DL
HCT VFR BLD CALC: 40.9 %
HGB BLD-MCNC: 13.2 G/DL
IMM GRANULOCYTES NFR BLD AUTO: 0.5 %
LYMPHOCYTES # BLD AUTO: 1.65 K/UL
LYMPHOCYTES NFR BLD AUTO: 20.8 %
MAN DIFF?: NORMAL
MCHC RBC-ENTMCNC: 27.1 PG
MCHC RBC-ENTMCNC: 32.3 GM/DL
MCV RBC AUTO: 84 FL
MONOCYTES # BLD AUTO: 0.74 K/UL
MONOCYTES NFR BLD AUTO: 9.3 %
NEUTROPHILS # BLD AUTO: 5.39 K/UL
NEUTROPHILS NFR BLD AUTO: 67.7 %
PLATELET # BLD AUTO: 210 K/UL
POTASSIUM SERPL-SCNC: 4.2 MMOL/L
RBC # BLD: 4.87 M/UL
RBC # FLD: 15.2 %
SODIUM SERPL-SCNC: 142 MMOL/L
WBC # FLD AUTO: 7.95 K/UL

## 2019-05-20 ENCOUNTER — APPOINTMENT (OUTPATIENT)
Dept: PULMONOLOGY | Facility: CLINIC | Age: 64
End: 2019-05-20
Payer: COMMERCIAL

## 2019-06-07 ENCOUNTER — APPOINTMENT (OUTPATIENT)
Dept: INTERNAL MEDICINE | Facility: CLINIC | Age: 64
End: 2019-06-07
Payer: COMMERCIAL

## 2019-06-07 VITALS
HEART RATE: 88 BPM | SYSTOLIC BLOOD PRESSURE: 140 MMHG | RESPIRATION RATE: 18 BRPM | BODY MASS INDEX: 32.07 KG/M2 | HEIGHT: 70 IN | WEIGHT: 224 LBS | DIASTOLIC BLOOD PRESSURE: 80 MMHG

## 2019-06-07 PROCEDURE — 99396 PREV VISIT EST AGE 40-64: CPT | Mod: 25

## 2019-06-07 PROCEDURE — 36415 COLL VENOUS BLD VENIPUNCTURE: CPT

## 2019-06-07 RX ORDER — INSULIN GLARGINE 100 [IU]/ML
100 INJECTION, SOLUTION SUBCUTANEOUS
Qty: 10 | Refills: 0 | Status: DISCONTINUED | COMMUNITY
Start: 2018-10-02 | End: 2019-06-07

## 2019-06-07 NOTE — HEALTH RISK ASSESSMENT
[Good] : ~his/her~  mood as  good [0] : 2) Feeling down, depressed, or hopeless: Not at all (0) [None] : None [With Significant Other] : lives with significant other [# of Members in Household ___] :  household currently consist of [unfilled] member(s) [Employed] : employed [# Of Children ___] : has [unfilled] children [Sexually Active] : sexually active [Feels Safe at Home] : Feels safe at home [Fully functional (bathing, dressing, toileting, transferring, walking, feeding)] : Fully functional (bathing, dressing, toileting, transferring, walking, feeding) [Fully functional (using the telephone, shopping, preparing meals, housekeeping, doing laundry, using] : Fully functional and needs no help or supervision to perform IADLs (using the telephone, shopping, preparing meals, housekeeping, doing laundry, using transportation, managing medications and managing finances) [Smoke Detector] : smoke detector [Carbon Monoxide Detector] : carbon monoxide detector [Seat Belt] :  uses seat belt [Sunscreen] : uses sunscreen [Discussed at today's visit] : Advance Directives Discussed at today's visit [Designated Healthcare Proxy] : Designated healthcare proxy [Name: ___] : Health Care Proxy's Name: [unfilled]  [No falls in past year] : Patient reported no falls in the past year [Reviewed no changes] : Reviewed no changes [] : No [de-identified] : active [de-identified] : good [de-identified] : rare [AFY1Hfjlg] : 0 [LowDoseCTScan] : 04/19 [2] : 2 [Change in mental status noted] : No change in mental status noted [Reports changes in hearing] : Reports no changes in hearing [Reports changes in vision] : Reports no changes in vision [Reports changes in dental health] : Reports no changes in dental health [HIVDate] : 07/14 [FreeTextEntry2] : Alisha [HepatitisCComments] : Patient with  hepatitis C [HIVComments] : negative [HepatitisCDate] : 02/00 [de-identified] : tinnitus

## 2019-06-07 NOTE — PHYSICAL EXAM
[General Appearance - Alert] : alert [General Appearance - In No Acute Distress] : in no acute distress [Sclera] : the sclera and conjunctiva were normal [PERRL With Normal Accommodation] : pupils were equal in size, round, and reactive to light [Extraocular Movements] : extraocular movements were intact [Oropharynx] : the oropharynx was normal [Outer Ear] : the ears and nose were normal in appearance [Neck Appearance] : the appearance of the neck was normal [Neck Cervical Mass (___cm)] : no neck mass was observed [Jugular Venous Distention Increased] : there was no jugular-venous distention [Thyroid Diffuse Enlargement] : the thyroid was not enlarged [Thyroid Nodule] : there were no palpable thyroid nodules [Auscultation Breath Sounds / Voice Sounds] : lungs were clear to auscultation bilaterally [Heart Sounds Gallop] : no gallops [Heart Sounds] : normal S1 and S2 [Murmurs] : no murmurs [Heart Sounds Pericardial Friction Rub] : no pericardial rub [Abdominal Aorta] : the abdominal aorta was normal [Arterial Pulses Femoral] : femoral pulses were normal without bruits [Arterial Pulses Carotid] : carotid pulses were normal with no bruits [Full Pulse] : the pedal pulses are present [Edema] : there was no peripheral edema [Veins - Varicosity Changes] : there were no varicosital changes [Bowel Sounds] : normal bowel sounds [Abdomen Tenderness] : non-tender [Abdomen Soft] : soft [Abdomen Mass (___ Cm)] : no abdominal mass palpated [Patient Refused] : rectal exam was refused by the patient [Cervical Lymph Nodes Enlarged Posterior Bilaterally] : posterior cervical [Supraclavicular Lymph Nodes Enlarged Bilaterally] : supraclavicular [Cervical Lymph Nodes Enlarged Anterior Bilaterally] : anterior cervical [Femoral Lymph Nodes Enlarged Bilaterally] : femoral [Axillary Lymph Nodes Enlarged Bilaterally] : axillary [Inguinal Lymph Nodes Enlarged Bilaterally] : inguinal [No Spinal Tenderness] : no spinal tenderness [Nail Clubbing] : no clubbing  or cyanosis of the fingernails [Abnormal Walk] : normal gait [Musculoskeletal - Swelling] : no joint swelling seen [Motor Tone] : muscle strength and tone were normal [Skin Turgor] : normal skin turgor [Skin Color & Pigmentation] : normal skin color and pigmentation [Right Foot Was Examined] : right foot was examined [] : no rash [Left Foot Was Examined] : left foot was examined [Normal Appearance] : normal in appearance [Normal in Appearance] : normal in appearance [Normal] : normal [2+] : 2+ in the dorsalis pedis [Cranial Nerves] : cranial nerves 2-12 were intact [No Focal Deficits] : no focal deficits [Oriented To Time, Place, And Person] : oriented to person, place, and time [Impaired Insight] : insight and judgment were intact [Affect] : the affect was normal [General Appearance - Well Nourished] : well nourished [#1 Diminished] : number 1 was normal [#3 Diminished] : number 3 was normal [#2 Diminished] : number 2 was normal [#4 Diminished] : number 4 was normal [#5 Diminished] : number 5 was normal [#6 Diminished] : number 6 was normal [#7 Diminished] : number 7 was normal [#8 Diminished] : number 8 was normal [#9 Diminished] : number 9 was normal [#10 Diminished] : number 10 was normal [FreeTextEntry1] : Bilateral chronic stasis changes, Abrasions bilateral mid-tibial areas with the right worse in the last with denuded skin and no ulcer and no sign of infection

## 2019-06-07 NOTE — HISTORY OF PRESENT ILLNESS
[No Weight Changes] : No weight changes [Sedentary] : Patient is sedentary [Contemplation] : Contemplation: patient is considering to lose weight within the next 6 months, patient did not attempt to lose weight in the last year. [Needs reinforcement, provided] : Patient needs reinforcement on understanding of disease, goals and obesity follow-up plan; reinforcement was provided [FreeTextEntry1] : 63-year-old male presents for his yearly physical with history of dilated cardiomyopathy as well as atrial fib for which he takes Eliquis. Patient also with history of hypertension for which he is on Cartia, Furosemide and metroprolol\par \par Type 2 diabetes has been treated with Lantus and glipizide and continued encouragement for the patient to improve his diet with exercise and weight loss. Patient is down 11 pounds from his last visit.\par \par Also history of obstructive sleep apnea for which the patient uses CPAP. Definite benefit\par \par Also history of hepatitis C which he is being followed by gastro-.\par \par Patient is generally feeling well without complaints including no chest pain, palpitations, shortness of breath or edema.\par \par The patient's significant issue  is that he was diagnosed with a left renal cancer about 14 months ago and had a partial left nephrectomy early April 2018.\par Postoperatively he has been somewhat winsome with patient had postoperative gross hematuria for which he needed to be readmitted with an embolization.\par Since he's had no further bleeding.\par .\par Interestingly the patient also had a right renal tumor for which he had an laparoscopic right partial nephrectomy 2 months ago with an also being cancer.\par Postoperatively no complications\par Dr. Michelle is following.\par \par Patient notes that his mother has been diagnosed with colon cancer and passed away recently.\par Also 2 brothers with colon polyps.\par The patient had his colonoscopy March 2018\par Despite multiple recommendations the patient still has not gotten a colonoscopy

## 2019-06-07 NOTE — ASSESSMENT
[FreeTextEntry1] : 63-year-old male without any evidence of cardiac or vascular decompensation but continued need for lifestyle changes with better diet and exercise and further weight loss.\par Long discussion with patient about needed lifestyle changes and better regimen patient with his diet to prevent episodes of hypoglycemia.\par \par The patient is now status post partial left and right partial nephrectomy for renal cell cancer who is currently stable\par \par For the present time patient is to continue present medications pending results of his blood work.\par \par Colonoscopy March 2018\par Ophthalmology one to 2 times per year\par Cardiology followup as scheduled\par CT scan of the lung April 2019\par \par Followup in 3 months

## 2019-06-07 NOTE — COUNSELING
[Weight management counseling provided] : Weight management [Healthy eating counseling provided] : healthy eating [Activity counseling provided] : activity [Decrease Portions] : Decrease food portions [Needs reinforcement, provided] : Patient needs reinforcement on understanding lifestyle changes and  the steps needed to achieve self management goals and reinforcement was provided [None] : None

## 2019-06-09 LAB
ALBUMIN SERPL ELPH-MCNC: 4.2 G/DL
ALP BLD-CCNC: 101 U/L
ALT SERPL-CCNC: 27 U/L
ANION GAP SERPL CALC-SCNC: 15 MMOL/L
APPEARANCE: CLEAR
AST SERPL-CCNC: 27 U/L
BACTERIA: NEGATIVE
BASOPHILS # BLD AUTO: 0.04 K/UL
BASOPHILS NFR BLD AUTO: 0.4 %
BILIRUB SERPL-MCNC: 0.2 MG/DL
BILIRUBIN URINE: NEGATIVE
BLOOD URINE: NEGATIVE
BUN SERPL-MCNC: 23 MG/DL
CALCIUM SERPL-MCNC: 9.1 MG/DL
CHLORIDE SERPL-SCNC: 105 MMOL/L
CHOLEST SERPL-MCNC: 113 MG/DL
CHOLEST/HDLC SERPL: 3.4 RATIO
CO2 SERPL-SCNC: 21 MMOL/L
COLOR: YELLOW
CREAT SERPL-MCNC: 1.46 MG/DL
EOSINOPHIL # BLD AUTO: 0.15 K/UL
EOSINOPHIL NFR BLD AUTO: 1.6 %
ESTIMATED AVERAGE GLUCOSE: 160 MG/DL
GLUCOSE QUALITATIVE U: NEGATIVE
GLUCOSE SERPL-MCNC: 190 MG/DL
HBA1C MFR BLD HPLC: 7.2 %
HCT VFR BLD CALC: 41.8 %
HDLC SERPL-MCNC: 33 MG/DL
HGB BLD-MCNC: 13.5 G/DL
HYALINE CASTS: 0 /LPF
IMM GRANULOCYTES NFR BLD AUTO: 0.4 %
KETONES URINE: NEGATIVE
LDLC SERPL CALC-MCNC: 55 MG/DL
LEUKOCYTE ESTERASE URINE: NEGATIVE
LYMPHOCYTES # BLD AUTO: 2.17 K/UL
LYMPHOCYTES NFR BLD AUTO: 22.8 %
MAGNESIUM SERPL-MCNC: 1.9 MG/DL
MAN DIFF?: NORMAL
MCHC RBC-ENTMCNC: 27.4 PG
MCHC RBC-ENTMCNC: 32.3 GM/DL
MCV RBC AUTO: 84.8 FL
MICROSCOPIC-UA: NORMAL
MONOCYTES # BLD AUTO: 0.82 K/UL
MONOCYTES NFR BLD AUTO: 8.6 %
NEUTROPHILS # BLD AUTO: 6.3 K/UL
NEUTROPHILS NFR BLD AUTO: 66.2 %
NITRITE URINE: NEGATIVE
PH URINE: 5.5
PLATELET # BLD AUTO: 257 K/UL
POTASSIUM SERPL-SCNC: 4.3 MMOL/L
PROT SERPL-MCNC: 7.2 G/DL
PROTEIN URINE: ABNORMAL
PSA SERPL-MCNC: 0.86 NG/ML
RBC # BLD: 4.93 M/UL
RBC # FLD: 15.4 %
RED BLOOD CELLS URINE: 3 /HPF
SODIUM SERPL-SCNC: 141 MMOL/L
SPECIFIC GRAVITY URINE: 1.02
SQUAMOUS EPITHELIAL CELLS: 0 /HPF
TRIGL SERPL-MCNC: 126 MG/DL
UROBILINOGEN URINE: NORMAL
WBC # FLD AUTO: 9.52 K/UL
WHITE BLOOD CELLS URINE: 1 /HPF

## 2019-06-10 ENCOUNTER — RESULT REVIEW (OUTPATIENT)
Age: 64
End: 2019-06-10

## 2019-06-25 ENCOUNTER — APPOINTMENT (OUTPATIENT)
Dept: PULMONOLOGY | Facility: CLINIC | Age: 64
End: 2019-06-25
Payer: COMMERCIAL

## 2019-06-25 VITALS — BODY MASS INDEX: 32.64 KG/M2 | HEIGHT: 70 IN | WEIGHT: 228 LBS

## 2019-06-25 VITALS — DIASTOLIC BLOOD PRESSURE: 80 MMHG | SYSTOLIC BLOOD PRESSURE: 124 MMHG | HEART RATE: 75 BPM | OXYGEN SATURATION: 97 %

## 2019-06-25 PROCEDURE — 99214 OFFICE O/P EST MOD 30 MIN: CPT

## 2019-06-25 NOTE — CONSULT LETTER
[Dear  ___] : Dear  [unfilled], [Please see my note below.] : Please see my note below. [Consult Letter:] : I had the pleasure of evaluating your patient, [unfilled]. [Sincerely,] : Sincerely, [Consult Closing:] : Thank you very much for allowing me to participate in the care of this patient.  If you have any questions, please do not hesitate to contact me. [Caleb Live MD] : Caleb Live MD

## 2019-06-25 NOTE — HISTORY OF PRESENT ILLNESS
[FreeTextEntry1] : 61 yo male with moderate to severe vaishali maintained on nocturnal nasal auto titrating cpap at 7-17 cm H20\par Excellent compliance (>8 hrs/night). AHI on cpap=1. No xs leak\par \par Partial nephrectomy for cancer at [facility] in April\par CT in February of 2018 with stable small L effusion and stable tiny intrapulmonic LNs\par \par Cardiomyopathy managed by Dr Morales\par Doing well with cpap

## 2019-06-25 NOTE — ASSESSMENT
[FreeTextEntry1] : Moderate to severe MARIANA well controlled on nocturnal AutoPap\par Compliant with and benefitting from nocturnal AutoPap\par Stable inconsequential Left pleural effusion and tiny intrapulmonic LNs

## 2019-06-25 NOTE — PHYSICAL EXAM
[Normal Conjunctiva] : the conjunctiva exhibited no abnormalities [Enlarged Base of the Tongue] : enlargement of the base of the tongue [Elongated Uvula] : elongated uvula [II] : II [Neck Appearance] : the appearance of the neck was normal [Jugular Venous Distention Increased] : there was no jugular-venous distention [Neck Cervical Mass (___cm)] : no neck mass was observed [Thyroid Diffuse Enlargement] : the thyroid was not enlarged [Neck Circumference: ___] : neck circumference is [unfilled] [Arterial Pulses Normal] : the arterial pulses were normal [Edema] : no peripheral edema present [Lungs Percussion] : the lungs were normal to percussion [Bowel Sounds] : normal bowel sounds [Abdomen Soft] : soft [Abdomen Tenderness] : non-tender [Abdomen Hernia] : no hernia was discovered [Cyanosis, Localized] : no localized cyanosis [Abnormal Walk] : normal gait [Nail Clubbing] : no clubbing of the fingernails [Skin Turgor] : normal skin turgor [Impaired Insight] : insight and judgment were intact [Mood] : the mood was normal [Oriented To Time, Place, And Person] : oriented to person, place, and time [Affect] : the affect was normal [] : no respiratory distress [General Appearance - In No Acute Distress] : in no acute distress [Respiration, Rhythm And Depth] : normal respiratory rhythm and effort [Auscultation Breath Sounds / Voice Sounds] : lungs were clear to auscultation bilaterally [Heart Sounds] : normal S1 and S2 [FreeTextEntry1] : + irreg with CVR

## 2019-07-01 ENCOUNTER — APPOINTMENT (OUTPATIENT)
Dept: OPHTHALMOLOGY | Facility: CLINIC | Age: 64
End: 2019-07-01
Payer: COMMERCIAL

## 2019-07-01 ENCOUNTER — NON-APPOINTMENT (OUTPATIENT)
Age: 64
End: 2019-07-01

## 2019-07-01 PROCEDURE — 92012 INTRM OPH EXAM EST PATIENT: CPT

## 2019-07-01 PROCEDURE — 92134 CPTRZ OPH DX IMG PST SGM RTA: CPT

## 2019-07-05 ENCOUNTER — RX RENEWAL (OUTPATIENT)
Age: 64
End: 2019-07-05

## 2019-07-05 RX ORDER — INSULIN DEGLUDEC INJECTION 100 U/ML
100 INJECTION, SOLUTION SUBCUTANEOUS DAILY
Refills: 0 | Status: DISCONTINUED | COMMUNITY
Start: 2019-06-07 | End: 2019-07-05

## 2019-07-10 ENCOUNTER — MEDICATION RENEWAL (OUTPATIENT)
Age: 64
End: 2019-07-10

## 2019-07-15 ENCOUNTER — RX RENEWAL (OUTPATIENT)
Age: 64
End: 2019-07-15

## 2019-07-15 ENCOUNTER — TRANSCRIPTION ENCOUNTER (OUTPATIENT)
Age: 64
End: 2019-07-15

## 2019-09-11 RX ORDER — AMIODARONE HYDROCHLORIDE 200 MG/1
200 TABLET ORAL
Refills: 0 | Status: ACTIVE | COMMUNITY
Start: 2019-09-11

## 2019-09-12 ENCOUNTER — APPOINTMENT (OUTPATIENT)
Dept: INTERNAL MEDICINE | Facility: CLINIC | Age: 64
End: 2019-09-12
Payer: COMMERCIAL

## 2019-09-12 VITALS
WEIGHT: 236 LBS | HEIGHT: 70 IN | OXYGEN SATURATION: 98 % | SYSTOLIC BLOOD PRESSURE: 130 MMHG | HEART RATE: 88 BPM | DIASTOLIC BLOOD PRESSURE: 80 MMHG | BODY MASS INDEX: 33.79 KG/M2

## 2019-09-12 PROCEDURE — 90686 IIV4 VACC NO PRSV 0.5 ML IM: CPT

## 2019-09-12 PROCEDURE — G0008: CPT

## 2019-09-12 PROCEDURE — 99213 OFFICE O/P EST LOW 20 MIN: CPT | Mod: 25

## 2019-09-12 PROCEDURE — 36415 COLL VENOUS BLD VENIPUNCTURE: CPT

## 2019-09-12 RX ORDER — SULFAMETHOXAZOLE AND TRIMETHOPRIM 800; 160 MG/1; MG/1
800-160 TABLET ORAL TWICE DAILY
Qty: 14 | Refills: 0 | Status: DISCONTINUED | COMMUNITY
Start: 2019-06-07 | End: 2019-09-12

## 2019-09-13 ENCOUNTER — RESULT REVIEW (OUTPATIENT)
Age: 64
End: 2019-09-13

## 2019-09-13 ENCOUNTER — TRANSCRIPTION ENCOUNTER (OUTPATIENT)
Age: 64
End: 2019-09-13

## 2019-09-13 LAB
ALBUMIN SERPL ELPH-MCNC: 4.4 G/DL
ALP BLD-CCNC: 100 U/L
ALT SERPL-CCNC: 32 U/L
ANION GAP SERPL CALC-SCNC: 12 MMOL/L
AST SERPL-CCNC: 30 U/L
BASOPHILS # BLD AUTO: 0.06 K/UL
BASOPHILS NFR BLD AUTO: 0.7 %
BILIRUB SERPL-MCNC: 0.4 MG/DL
BUN SERPL-MCNC: 22 MG/DL
CALCIUM SERPL-MCNC: 9.5 MG/DL
CHLORIDE SERPL-SCNC: 109 MMOL/L
CHOLEST SERPL-MCNC: 100 MG/DL
CHOLEST/HDLC SERPL: 2.9 RATIO
CO2 SERPL-SCNC: 22 MMOL/L
CREAT SERPL-MCNC: 1.81 MG/DL
DIGOXIN SERPL-MCNC: <0.3 NG/ML
EOSINOPHIL # BLD AUTO: 0.13 K/UL
EOSINOPHIL NFR BLD AUTO: 1.6 %
ESTIMATED AVERAGE GLUCOSE: 171 MG/DL
GLUCOSE SERPL-MCNC: 137 MG/DL
HBA1C MFR BLD HPLC: 7.6 %
HCT VFR BLD CALC: 43.1 %
HDLC SERPL-MCNC: 34 MG/DL
HGB BLD-MCNC: 13.9 G/DL
IMM GRANULOCYTES NFR BLD AUTO: 0.7 %
LDLC SERPL CALC-MCNC: 39 MG/DL
LYMPHOCYTES # BLD AUTO: 1.92 K/UL
LYMPHOCYTES NFR BLD AUTO: 23.1 %
MAGNESIUM SERPL-MCNC: 1.9 MG/DL
MAN DIFF?: NORMAL
MCHC RBC-ENTMCNC: 28.4 PG
MCHC RBC-ENTMCNC: 32.3 GM/DL
MCV RBC AUTO: 88 FL
MONOCYTES # BLD AUTO: 0.82 K/UL
MONOCYTES NFR BLD AUTO: 9.9 %
NEUTROPHILS # BLD AUTO: 5.31 K/UL
NEUTROPHILS NFR BLD AUTO: 64 %
PLATELET # BLD AUTO: 217 K/UL
POTASSIUM SERPL-SCNC: 4.5 MMOL/L
PROT SERPL-MCNC: 7.7 G/DL
RBC # BLD: 4.9 M/UL
RBC # FLD: 15.1 %
SODIUM SERPL-SCNC: 143 MMOL/L
TRIGL SERPL-MCNC: 133 MG/DL
TSH SERPL-ACNC: 4.58 UIU/ML
WBC # FLD AUTO: 8.3 K/UL

## 2019-09-13 NOTE — ADDENDUM
[FreeTextEntry1] : Labs show\par -creatinine of 1.8\par -TSH of 4.5 newly on amiodarone\par Repeat in 3-4 weeks\par

## 2019-09-13 NOTE — ASSESSMENT
[FreeTextEntry1] : Labs sent out.Patient advised to continue present medications with diet and exercise. Patient will follow up with specialists as scheduled.Flu Vaccine given without reaction. Patient will return to the office in 3months\par \par \par 24-hour urine was done 1/2019-sent to renal- Dr. Gong \par specialists include..\par 1. cardiology-Dr. Eckert\par 2. ophthalmology-Dr.Goldberg-10/2018\par 3. podiatry-NO LONGER GOES\par 4. apnea doctor-Dr. Live\par 5. gastro-"-Dr. Sanchez\par 6.-Dr. Michelle\par 7.Endocrine-Dr. Nino\par 8.renal-Dr. Gong\par colonoscopy 3/18\par abdominal sonogram=6/17\par discussed shingles vaccine/Declines pneumovax\par carotid sonogram was 11/16 with +plaque,no stenosis (told pt to check with cardio regarding follow up sono)\par Echo was 7/2019\par Chest x-ray was done 5/4/18 showing small left effusion=repeat 2/2018=clear\par Hep C screen has been done in the past\par CT lung screening not applicable as patient quit greater than 30 years ago\par CT lung done on 4/2019

## 2019-09-13 NOTE — HISTORY OF PRESENT ILLNESS
[FreeTextEntry1] : Patient presents for followup on hypertension/hyperlipidemia/type 2 diabetes. . Patient is currently on Cartia/Toprol for hypertension,On Crestor for hyperlipidemia and is on NovoLog/Lantus/glipizide for type 2 diabetes\par -Feels well without complaints\par -Amiodarone was added due uncontrolled rate with A. fib, patient reports he is feeling better\par \par \par \par  \par \par \par

## 2019-09-13 NOTE — HEALTH RISK ASSESSMENT
[Yes] : Yes [1 or 2 (0 pts)] : 1 or 2 (0 points) [2 - 3 times a week (3 pts)] : 2 - 3  times a week (3 points) [Never (0 pts)] : Never (0 points) [No] : In the past 12 months have you used drugs other than those required for medical reasons? No [Audit-CScore] : 3 points

## 2019-09-26 ENCOUNTER — RX RENEWAL (OUTPATIENT)
Age: 64
End: 2019-09-26

## 2019-09-26 ENCOUNTER — TRANSCRIPTION ENCOUNTER (OUTPATIENT)
Age: 64
End: 2019-09-26

## 2019-09-30 ENCOUNTER — APPOINTMENT (OUTPATIENT)
Dept: ENDOCRINOLOGY | Facility: CLINIC | Age: 64
End: 2019-09-30
Payer: COMMERCIAL

## 2019-09-30 VITALS
HEIGHT: 70 IN | HEART RATE: 80 BPM | DIASTOLIC BLOOD PRESSURE: 80 MMHG | RESPIRATION RATE: 18 BRPM | SYSTOLIC BLOOD PRESSURE: 122 MMHG | BODY MASS INDEX: 34.22 KG/M2 | WEIGHT: 239 LBS | OXYGEN SATURATION: 99 %

## 2019-09-30 PROCEDURE — 99214 OFFICE O/P EST MOD 30 MIN: CPT

## 2019-09-30 RX ORDER — FUROSEMIDE 20 MG/1
20 TABLET ORAL
Qty: 10 | Refills: 0 | Status: DISCONTINUED | COMMUNITY
Start: 2018-05-07 | End: 2019-09-30

## 2019-09-30 NOTE — HISTORY OF PRESENT ILLNESS
[FreeTextEntry1] : DM2 . HTN, HLD. 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 \par He had partial nephrectomy for renal carcinoma. \par He takes glipizide 10 mg BID \par Takes tresiba 16u  HS \par no hypoG

## 2019-09-30 NOTE — ASSESSMENT
[Diabetes Foot Care] : diabetes foot care [Carbohydrate Consistent Diet] : carbohydrate consistent diet [Long Term Vascular Complications] : long term vascular complications of diabetes [Importance of Diet and Exercise] : importance of diet and exercise to improve glycemic control, achieve weight loss and improve cardiovascular health [Self Monitoring of Blood Glucose] : self monitoring of blood glucose [Retinopathy Screening] : Patient was referred to ophthalmology for retinopathy screening [FreeTextEntry1] : DM2 mildly uncontrolled with a1c 7.6  with nephropathy, HTN , HLD , CHENEY.  Now s/p renal carcinoma resection B/L. bgs 115-150 and lunch the same. After dinner 200-240. \par \par DM2 : \par patient will check bgs actid and hs for 5 days and fax me logbook \par all lab results reviewed and discussed with patient. All questions answered\par continue for now glipizide XL 10 mg BID \par continue tresiba 18 u HS \par continue Humalog 2 u for bkfast and increase dinner dose to 4 u. He does not take for lunch but he has hypoG before dinner consistently due to physical work at his job. Advised to have a snack carbs and protein in afternoon.\par discussed diet and exercise\par encouraged more exercise walking 30 min 3 x week\par Discussed complications of diabetes at length including MI/CVA/ESRD/dialysis/blindness/amputations/infections\par Can also try to have bedtime snack at night with protein to try to lower AM FS\par Needs to try to have more protein for meals\par eye exam  August 2019 : reportedly no DR \par Bgs 3x day \par GFR 39 getting worse. Referral to nephrology. \par has some microalbuminuria .he needs ACEI or ARB but i would wait nephrology input \par \par HTN:  bp at target on meds. Continue current management. \par \par HLD: TG down to 133 since Dm corrected \par continue crestor 5 mg qd. \par LDl at target \par will repeat lipids in 3 mos \par \par obesity:  \par encouraged more exercise walking 30 min 3 x week\par discussed diet and exercise\par \par

## 2019-09-30 NOTE — PHYSICAL EXAM
[Alert] : alert [No Acute Distress] : no acute distress [Well Nourished] : well nourished [Well Developed] : well developed [Normal Sclera/Conjunctiva] : normal sclera/conjunctiva [No Proptosis] : no proptosis [EOMI] : extra ocular movement intact [Normal Oropharynx] : the oropharynx was normal [Thyroid Not Enlarged] : the thyroid was not enlarged [No Thyroid Nodules] : there were no palpable thyroid nodules [No Respiratory Distress] : no respiratory distress [No Accessory Muscle Use] : no accessory muscle use [Clear to Auscultation] : lungs were clear to auscultation bilaterally [Normal Rate] : heart rate was normal  [Regular Rhythm] : with a regular rhythm [Normal S1, S2] : normal S1 and S2 [Pedal Pulses Normal] : the pedal pulses are present [No Edema] : there was no peripheral edema [Normal Bowel Sounds] : normal bowel sounds [Not Tender] : non-tender [Soft] : abdomen soft [Not Distended] : not distended [Post Cervical Nodes] : posterior cervical nodes [Normal] : normal and non tender [Anterior Cervical Nodes] : anterior cervical nodes [Spine Straight] : spine straight [No Stigmata of Cushings Syndrome] : no stigmata of cushings syndrome [Normal Strength/Tone] : muscle strength and tone were normal [No Rash] : no rash [Normal Gait] : normal gait [Normal Reflexes] : deep tendon reflexes were 2+ and symmetric [Oriented x3] : oriented to person, place, and time [No Tremors] : no tremors [Acanthosis Nigricans] : no acanthosis nigricans [de-identified] : necrobiosis lipoidica both calves

## 2019-10-07 ENCOUNTER — APPOINTMENT (OUTPATIENT)
Dept: OPHTHALMOLOGY | Facility: CLINIC | Age: 64
End: 2019-10-07
Payer: COMMERCIAL

## 2019-10-07 ENCOUNTER — NON-APPOINTMENT (OUTPATIENT)
Age: 64
End: 2019-10-07

## 2019-10-07 PROCEDURE — 92012 INTRM OPH EXAM EST PATIENT: CPT

## 2019-10-07 PROCEDURE — 92134 CPTRZ OPH DX IMG PST SGM RTA: CPT

## 2019-10-11 LAB
APPEARANCE: CLEAR
BACTERIA: NEGATIVE
BASOPHILS # BLD AUTO: 0.04 K/UL
BASOPHILS NFR BLD AUTO: 0.6 %
BILIRUBIN URINE: NEGATIVE
BLOOD URINE: NEGATIVE
COLOR: YELLOW
EOSINOPHIL # BLD AUTO: 0.1 K/UL
EOSINOPHIL NFR BLD AUTO: 1.4 %
GLUCOSE QUALITATIVE U: NEGATIVE
HCT VFR BLD CALC: 46.4 %
HGB BLD-MCNC: 14.1 G/DL
HYALINE CASTS: 0 /LPF
IMM GRANULOCYTES NFR BLD AUTO: 0.7 %
KETONES URINE: NEGATIVE
LEUKOCYTE ESTERASE URINE: NEGATIVE
LYMPHOCYTES # BLD AUTO: 1.23 K/UL
LYMPHOCYTES NFR BLD AUTO: 17 %
MAN DIFF?: NORMAL
MCHC RBC-ENTMCNC: 26.2 PG
MCHC RBC-ENTMCNC: 30.4 GM/DL
MCV RBC AUTO: 86.2 FL
MICROSCOPIC-UA: NORMAL
MONOCYTES # BLD AUTO: 0.68 K/UL
MONOCYTES NFR BLD AUTO: 9.4 %
NEUTROPHILS # BLD AUTO: 5.12 K/UL
NEUTROPHILS NFR BLD AUTO: 70.9 %
NITRITE URINE: NEGATIVE
PH URINE: 6
PLATELET # BLD AUTO: 217 K/UL
PROTEIN URINE: ABNORMAL
RBC # BLD: 5.38 M/UL
RBC # FLD: 15.6 %
RED BLOOD CELLS URINE: 4 /HPF
SPECIFIC GRAVITY URINE: 1.02
SQUAMOUS EPITHELIAL CELLS: 0 /HPF
UROBILINOGEN URINE: NORMAL
WBC # FLD AUTO: 7.22 K/UL
WHITE BLOOD CELLS URINE: 1 /HPF

## 2019-10-17 ENCOUNTER — APPOINTMENT (OUTPATIENT)
Dept: INTERNAL MEDICINE | Facility: CLINIC | Age: 64
End: 2019-10-17
Payer: COMMERCIAL

## 2019-10-17 VITALS
OXYGEN SATURATION: 98 % | WEIGHT: 236 LBS | DIASTOLIC BLOOD PRESSURE: 80 MMHG | HEART RATE: 66 BPM | BODY MASS INDEX: 33.79 KG/M2 | HEIGHT: 70 IN | SYSTOLIC BLOOD PRESSURE: 140 MMHG

## 2019-10-17 PROCEDURE — 99213 OFFICE O/P EST LOW 20 MIN: CPT | Mod: 25

## 2019-10-17 PROCEDURE — 36415 COLL VENOUS BLD VENIPUNCTURE: CPT

## 2019-10-17 NOTE — ASSESSMENT
[FreeTextEntry1] : Labs sent out.Patient advised to continue present medications with diet and exercise. Patient will follow up with specialists as scheduled. Patient will return to the office as sched for reg check\par \par \par 24-hour urine was done 1/2019-sent to renal- Dr. Gong \par specialists include..\par 1. cardiology-Dr. Eckert\par 2. ophthalmology-Dr.Goldberg-10/2019\par 3. podiatry-NO LONGER GOES\par 4. apnea doctor-Dr. Live\par 5. gastro-"-Dr. Sanchez\par 6.-Dr. Michelle\par 7.Endocrine-Dr. Nino\par 8.renal-Dr. Gong\par colonoscopy 3/18\par abdominal sonogram=6/17\par discussed shingles vaccine/Declines pneumovax\par carotid sonogram was 11/16 with +plaque,no stenosis (told pt to check with cardio regarding follow up sono)\par Echo was 7/2019\par Chest x-ray was done 5/4/18 showing small left effusion=repeat 2/2018=clear\par Hep C screen has been done in the past\par CT lung screening not applicable as patient quit greater than 30 years ago\par CT lung done on 4/2019

## 2019-10-18 ENCOUNTER — RESULT REVIEW (OUTPATIENT)
Age: 64
End: 2019-10-18

## 2019-10-18 ENCOUNTER — TRANSCRIPTION ENCOUNTER (OUTPATIENT)
Age: 64
End: 2019-10-18

## 2019-10-18 LAB
ANION GAP SERPL CALC-SCNC: 12 MMOL/L
BUN SERPL-MCNC: 25 MG/DL
CALCIUM SERPL-MCNC: 9.2 MG/DL
CHLORIDE SERPL-SCNC: 107 MMOL/L
CO2 SERPL-SCNC: 23 MMOL/L
CREAT SERPL-MCNC: 1.67 MG/DL
GLUCOSE SERPL-MCNC: 197 MG/DL
POTASSIUM SERPL-SCNC: 4.4 MMOL/L
SODIUM SERPL-SCNC: 142 MMOL/L
T3 SERPL-MCNC: 95 NG/DL
T4 FREE SERPL-MCNC: 1.1 NG/DL
THYROGLOB AB SERPL-ACNC: <20 IU/ML
THYROPEROXIDASE AB SERPL IA-ACNC: <10 IU/ML
TSH SERPL-ACNC: 3.89 UIU/ML

## 2019-10-21 ENCOUNTER — APPOINTMENT (OUTPATIENT)
Dept: NEPHROLOGY | Facility: CLINIC | Age: 64
End: 2019-10-21
Payer: COMMERCIAL

## 2019-10-21 VITALS
BODY MASS INDEX: 34.65 KG/M2 | HEIGHT: 70 IN | WEIGHT: 242 LBS | DIASTOLIC BLOOD PRESSURE: 98 MMHG | SYSTOLIC BLOOD PRESSURE: 152 MMHG | HEART RATE: 98 BPM

## 2019-10-21 PROCEDURE — 36415 COLL VENOUS BLD VENIPUNCTURE: CPT

## 2019-10-21 PROCEDURE — 99215 OFFICE O/P EST HI 40 MIN: CPT | Mod: 25

## 2019-10-21 RX ORDER — BLOOD SUGAR DIAGNOSTIC
STRIP MISCELLANEOUS
Qty: 4 | Refills: 0 | Status: DISCONTINUED | COMMUNITY
Start: 2019-07-10 | End: 2019-10-21

## 2019-10-21 RX ORDER — DOCUSATE SODIUM 100 MG/1
100 CAPSULE ORAL
Refills: 0 | Status: DISCONTINUED | COMMUNITY
Start: 2018-04-30 | End: 2019-10-21

## 2019-10-21 RX ORDER — PEN NEEDLE, DIABETIC 32GX 5/32"
32G X 4 MM NEEDLE, DISPOSABLE MISCELLANEOUS
Qty: 1 | Refills: 0 | Status: DISCONTINUED | COMMUNITY
Start: 2019-09-16 | End: 2019-10-21

## 2019-10-21 NOTE — PHYSICAL EXAM
[General Appearance - Alert] : alert [General Appearance - In No Acute Distress] : in no acute distress [General Appearance - Well Nourished] : well nourished [General Appearance - Well Developed] : well developed [General Appearance - Well-Appearing] : healthy appearing [PERRL With Normal Accommodation] : pupils were equal in size, round, and reactive to light [Sclera] : the sclera and conjunctiva were normal [Extraocular Movements] : extraocular movements were intact [Strabismus] : no strabismus was seen [Outer Ear] : the ears and nose were normal in appearance [Hearing Threshold Finger Rub Not Ransom] : hearing was normal [Both Tympanic Membranes Were Examined] : both tympanic membranes were normal [Nasal Cavity] : the nasal mucosa and septum were normal [Examination Of The Oral Cavity] : the lips and gums were normal [Oropharynx] : the oropharynx was normal [Neck Appearance] : the appearance of the neck was normal [Neck Cervical Mass (___cm)] : no neck mass was observed [Jugular Venous Distention Increased] : there was no jugular-venous distention [Thyroid Nodule] : there were no palpable thyroid nodules [Thyroid Diffuse Enlargement] : the thyroid was not enlarged [Exaggerated Use Of Accessory Muscles For Inspiration] : no accessory muscle use [Chest Palpation] : palpation of the chest revealed no abnormalities [Lungs Percussion] : the lungs were normal to percussion [Auscultation Breath Sounds / Voice Sounds] : lungs were clear to auscultation bilaterally [Heart Sounds] : normal S1 and S2 [Heart Sounds Gallop] : no gallops [Heart Rate And Rhythm] : heart rate was normal and rhythm regular [Murmurs] : no murmurs [Heart Sounds Pericardial Friction Rub] : no pericardial rub [Edema] : there was no peripheral edema [Full Pulse] : the pedal pulses are present [Abdomen Soft] : soft [Bowel Sounds] : normal bowel sounds [Abdomen Tenderness] : non-tender [Abdomen Mass (___ Cm)] : no abdominal mass palpated [Supraclavicular Lymph Nodes Enlarged Bilaterally] : supraclavicular [Cervical Lymph Nodes Enlarged Anterior Bilaterally] : anterior cervical [Cervical Lymph Nodes Enlarged Posterior Bilaterally] : posterior cervical [Axillary Lymph Nodes Enlarged Bilaterally] : axillary [Femoral Lymph Nodes Enlarged Bilaterally] : femoral [No CVA Tenderness] : no ~M costovertebral angle tenderness [Inguinal Lymph Nodes Enlarged Bilaterally] : inguinal [No Spinal Tenderness] : no spinal tenderness [Abnormal Walk] : normal gait [Nail Clubbing] : no clubbing  or cyanosis of the fingernails [Skin Color & Pigmentation] : normal skin color and pigmentation [Motor Tone] : muscle strength and tone were normal [Musculoskeletal - Swelling] : no joint swelling seen [] : no rash [Deep Tendon Reflexes (DTR)] : deep tendon reflexes were 2+ and symmetric [Skin Turgor] : normal skin turgor [Sensation] : the sensory exam was normal to light touch and pinprick [No Focal Deficits] : no focal deficits [Oriented To Time, Place, And Person] : oriented to person, place, and time [Impaired Insight] : insight and judgment were intact [Affect] : the affect was normal [FreeTextEntry1] : AF,

## 2019-10-21 NOTE — HISTORY OF PRESENT ILLNESS
[FreeTextEntry1] : REF : Proteinuria,\par \par DM, X 10 years, S/P Partial L - Nephrectomy ( RCC )  F/U US - P :\par \par Non Ischemic Cardiomyopathy, HTN, \par \par Holter in Place.  PAF, On AC, \par \par HTN Management - Per Cardiology , Rate Control,

## 2019-10-21 NOTE — ASSESSMENT
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

## 2019-10-23 LAB
25(OH)D3 SERPL-MCNC: 39.7 NG/ML
ALBUMIN SERPL ELPH-MCNC: 4.3 G/DL
ANION GAP SERPL CALC-SCNC: 14 MMOL/L
APPEARANCE: CLEAR
BACTERIA: NEGATIVE
BASOPHILS # BLD AUTO: 0.05 K/UL
BASOPHILS NFR BLD AUTO: 0.6 %
BILIRUBIN URINE: NEGATIVE
BLOOD URINE: NORMAL
BUN SERPL-MCNC: 25 MG/DL
CALCIUM SERPL-MCNC: 9.8 MG/DL
CALCIUM SERPL-MCNC: 9.8 MG/DL
CHLORIDE SERPL-SCNC: 106 MMOL/L
CO2 SERPL-SCNC: 21 MMOL/L
COLOR: NORMAL
CREAT SERPL-MCNC: 1.67 MG/DL
CREAT SPEC-SCNC: 114 MG/DL
CREAT/PROT UR: 0.9 RATIO
EOSINOPHIL # BLD AUTO: 0.14 K/UL
EOSINOPHIL NFR BLD AUTO: 1.7 %
GLUCOSE QUALITATIVE U: ABNORMAL
GLUCOSE SERPL-MCNC: 195 MG/DL
HCT VFR BLD CALC: 44.1 %
HGB BLD-MCNC: 14.3 G/DL
HYALINE CASTS: 1 /LPF
IMM GRANULOCYTES NFR BLD AUTO: 0.7 %
KETONES URINE: NEGATIVE
LEUKOCYTE ESTERASE URINE: NEGATIVE
LYMPHOCYTES # BLD AUTO: 1.55 K/UL
LYMPHOCYTES NFR BLD AUTO: 18.7 %
MAN DIFF?: NORMAL
MCHC RBC-ENTMCNC: 27.9 PG
MCHC RBC-ENTMCNC: 32.4 GM/DL
MCV RBC AUTO: 86.1 FL
MICROSCOPIC-UA: NORMAL
MONOCYTES # BLD AUTO: 0.82 K/UL
MONOCYTES NFR BLD AUTO: 9.9 %
NEUTROPHILS # BLD AUTO: 5.68 K/UL
NEUTROPHILS NFR BLD AUTO: 68.4 %
NITRITE URINE: NEGATIVE
PARATHYROID HORMONE INTACT: 48 PG/ML
PH URINE: 5.5
PHOSPHATE SERPL-MCNC: 3.2 MG/DL
PLATELET # BLD AUTO: 238 K/UL
POTASSIUM SERPL-SCNC: 5.2 MMOL/L
PROT UR-MCNC: 102 MG/DL
PROTEIN URINE: ABNORMAL
RBC # BLD: 5.12 M/UL
RBC # FLD: 14.1 %
RED BLOOD CELLS URINE: 1 /HPF
SODIUM SERPL-SCNC: 141 MMOL/L
SPECIFIC GRAVITY URINE: 1.02
SQUAMOUS EPITHELIAL CELLS: 0 /HPF
UROBILINOGEN URINE: NORMAL
WBC # FLD AUTO: 8.3 K/UL
WHITE BLOOD CELLS URINE: 1 /HPF

## 2019-10-28 ENCOUNTER — TRANSCRIPTION ENCOUNTER (OUTPATIENT)
Age: 64
End: 2019-10-28

## 2019-10-28 ENCOUNTER — RX RENEWAL (OUTPATIENT)
Age: 64
End: 2019-10-28

## 2019-10-30 ENCOUNTER — MEDICATION RENEWAL (OUTPATIENT)
Age: 64
End: 2019-10-30

## 2019-11-01 ENCOUNTER — OUTPATIENT (OUTPATIENT)
Dept: OUTPATIENT SERVICES | Facility: HOSPITAL | Age: 64
LOS: 1 days | End: 2019-11-01
Payer: COMMERCIAL

## 2019-11-01 ENCOUNTER — APPOINTMENT (OUTPATIENT)
Dept: UROLOGY | Facility: CLINIC | Age: 64
End: 2019-11-01
Payer: COMMERCIAL

## 2019-11-01 DIAGNOSIS — Z90.5 ACQUIRED ABSENCE OF KIDNEY: Chronic | ICD-10-CM

## 2019-11-01 DIAGNOSIS — Z90.5 ACQUIRED ABSENCE OF KIDNEY: ICD-10-CM

## 2019-11-01 DIAGNOSIS — Z98.890 OTHER SPECIFIED POSTPROCEDURAL STATES: Chronic | ICD-10-CM

## 2019-11-01 DIAGNOSIS — R35.0 FREQUENCY OF MICTURITION: ICD-10-CM

## 2019-11-01 DIAGNOSIS — I48.91 UNSPECIFIED ATRIAL FIBRILLATION: Chronic | ICD-10-CM

## 2019-11-01 PROCEDURE — 76775 US EXAM ABDO BACK WALL LIM: CPT | Mod: 26

## 2019-11-01 PROCEDURE — 99214 OFFICE O/P EST MOD 30 MIN: CPT | Mod: 25

## 2019-11-01 PROCEDURE — 76775 US EXAM ABDO BACK WALL LIM: CPT

## 2019-11-01 NOTE — ASSESSMENT
[FreeTextEntry1] : Renal ultrasound done with no evidence of recurrence. He is wearing a holter monitor today . He is being seen by nephrology for proteinuria , He is working on decreasing his Hgb A1C

## 2019-11-01 NOTE — PHYSICAL EXAM
[General Appearance - Well Developed] : well developed [General Appearance - Well Nourished] : well nourished [Abdomen Soft] : soft [Skin Color & Pigmentation] : normal skin color and pigmentation [Heart Rate And Rhythm] : Heart rate and rhythm were normal [] : no respiratory distress [Oriented To Time, Place, And Person] : oriented to person, place, and time [Normal Station and Gait] : the gait and station were normal for the patient's age [No Focal Deficits] : no focal deficits

## 2019-11-01 NOTE — HISTORY OF PRESENT ILLNESS
[FreeTextEntry1] : Left partial nephrectomy in April 2018 for RCC FG 2 pT1a. Right Partial nephrectomy  papillary type 2 FG1 in April 2019 \par He is having some cardiac and diabetes issues . He is being worked up for all of this

## 2019-11-07 DIAGNOSIS — Z90.5 ACQUIRED ABSENCE OF KIDNEY: ICD-10-CM

## 2019-11-19 ENCOUNTER — APPOINTMENT (OUTPATIENT)
Dept: UROLOGY | Facility: CLINIC | Age: 64
End: 2019-11-19

## 2019-12-09 ENCOUNTER — APPOINTMENT (OUTPATIENT)
Dept: PULMONOLOGY | Facility: CLINIC | Age: 64
End: 2019-12-09
Payer: COMMERCIAL

## 2019-12-09 VITALS
SYSTOLIC BLOOD PRESSURE: 118 MMHG | HEIGHT: 70 IN | BODY MASS INDEX: 35.07 KG/M2 | HEART RATE: 67 BPM | WEIGHT: 245 LBS | DIASTOLIC BLOOD PRESSURE: 80 MMHG | OXYGEN SATURATION: 96 %

## 2019-12-09 PROCEDURE — 99214 OFFICE O/P EST MOD 30 MIN: CPT

## 2019-12-09 NOTE — HISTORY OF PRESENT ILLNESS
[FreeTextEntry1] : 61 yo male with moderate to severe vaishali maintained on nocturnal nasal auto titrating cpap at 7-17 cm H20\par Excellent compliance (>8 hrs/night). AHI on cpap=1. No xs leak\par \par Partial nephrectomy for cancer at [facility] in April\par CT in February of 2018 with stable small L effusion and stable tiny intrapulmonic LNs\par \par Cardiomyopathy managed by Dr Morales\par Doing well with cpap

## 2019-12-09 NOTE — PHYSICAL EXAM
[Normal Conjunctiva] : the conjunctiva exhibited no abnormalities [II] : II [Elongated Uvula] : elongated uvula [Enlarged Base of the Tongue] : enlargement of the base of the tongue [Neck Appearance] : the appearance of the neck was normal [Neck Cervical Mass (___cm)] : no neck mass was observed [Thyroid Diffuse Enlargement] : the thyroid was not enlarged [Jugular Venous Distention Increased] : there was no jugular-venous distention [Neck Circumference: ___] : neck circumference is [unfilled] [Arterial Pulses Normal] : the arterial pulses were normal [Edema] : no peripheral edema present [Lungs Percussion] : the lungs were normal to percussion [Abdomen Soft] : soft [Bowel Sounds] : normal bowel sounds [Abdomen Tenderness] : non-tender [Abdomen Hernia] : no hernia was discovered [Abnormal Walk] : normal gait [Cyanosis, Localized] : no localized cyanosis [Nail Clubbing] : no clubbing of the fingernails [Skin Turgor] : normal skin turgor [Oriented To Time, Place, And Person] : oriented to person, place, and time [Impaired Insight] : insight and judgment were intact [Affect] : the affect was normal [Mood] : the mood was normal [General Appearance - In No Acute Distress] : in no acute distress [Respiration, Rhythm And Depth] : normal respiratory rhythm and effort [] : no respiratory distress [Heart Sounds] : normal S1 and S2 [Auscultation Breath Sounds / Voice Sounds] : lungs were clear to auscultation bilaterally [FreeTextEntry1] : + irreg with CVR

## 2019-12-19 ENCOUNTER — MEDICATION RENEWAL (OUTPATIENT)
Age: 64
End: 2019-12-19

## 2019-12-20 ENCOUNTER — APPOINTMENT (OUTPATIENT)
Dept: DERMATOLOGY | Facility: CLINIC | Age: 64
End: 2019-12-20
Payer: COMMERCIAL

## 2019-12-20 PROCEDURE — 99203 OFFICE O/P NEW LOW 30 MIN: CPT

## 2019-12-23 ENCOUNTER — APPOINTMENT (OUTPATIENT)
Dept: INTERNAL MEDICINE | Facility: CLINIC | Age: 64
End: 2019-12-23
Payer: COMMERCIAL

## 2019-12-23 VITALS
OXYGEN SATURATION: 98 % | DIASTOLIC BLOOD PRESSURE: 82 MMHG | SYSTOLIC BLOOD PRESSURE: 130 MMHG | HEIGHT: 70 IN | WEIGHT: 247 LBS | HEART RATE: 70 BPM | BODY MASS INDEX: 35.36 KG/M2

## 2019-12-23 DIAGNOSIS — I65.29 OCCLUSION AND STENOSIS OF UNSPECIFIED CAROTID ARTERY: ICD-10-CM

## 2019-12-23 PROCEDURE — 99213 OFFICE O/P EST LOW 20 MIN: CPT | Mod: 25

## 2019-12-23 PROCEDURE — 36415 COLL VENOUS BLD VENIPUNCTURE: CPT

## 2019-12-27 ENCOUNTER — RESULT REVIEW (OUTPATIENT)
Age: 64
End: 2019-12-27

## 2019-12-27 ENCOUNTER — TRANSCRIPTION ENCOUNTER (OUTPATIENT)
Age: 64
End: 2019-12-27

## 2019-12-27 LAB
ALBUMIN SERPL ELPH-MCNC: 4.4 G/DL
ALP BLD-CCNC: 99 U/L
ALT SERPL-CCNC: 45 U/L
ANION GAP SERPL CALC-SCNC: 14 MMOL/L
AST SERPL-CCNC: 32 U/L
BASOPHILS # BLD AUTO: 0.07 K/UL
BASOPHILS NFR BLD AUTO: 0.7 %
BILIRUB SERPL-MCNC: 0.3 MG/DL
BUN SERPL-MCNC: 22 MG/DL
CALCIUM SERPL-MCNC: 9.4 MG/DL
CHLORIDE SERPL-SCNC: 104 MMOL/L
CHOLEST SERPL-MCNC: 148 MG/DL
CHOLEST/HDLC SERPL: 3.4 RATIO
CO2 SERPL-SCNC: 22 MMOL/L
CREAT SERPL-MCNC: 1.86 MG/DL
CREAT SPEC-SCNC: 193 MG/DL
DIGOXIN SERPL-MCNC: <0.3 NG/ML
EOSINOPHIL # BLD AUTO: 0.15 K/UL
EOSINOPHIL NFR BLD AUTO: 1.5 %
ESTIMATED AVERAGE GLUCOSE: 200 MG/DL
GLUCOSE SERPL-MCNC: 139 MG/DL
HBA1C MFR BLD HPLC: 8.6 %
HCT VFR BLD CALC: 45.2 %
HDLC SERPL-MCNC: 44 MG/DL
HGB BLD-MCNC: 14.7 G/DL
IMM GRANULOCYTES NFR BLD AUTO: 0.5 %
LDLC SERPL CALC-MCNC: 78 MG/DL
LYMPHOCYTES # BLD AUTO: 2.09 K/UL
LYMPHOCYTES NFR BLD AUTO: 21.2 %
MAGNESIUM SERPL-MCNC: 2 MG/DL
MAN DIFF?: NORMAL
MCHC RBC-ENTMCNC: 27.7 PG
MCHC RBC-ENTMCNC: 32.5 GM/DL
MCV RBC AUTO: 85.1 FL
MICROALBUMIN 24H UR DL<=1MG/L-MCNC: 63 MG/DL
MICROALBUMIN/CREAT 24H UR-RTO: 327 MG/G
MONOCYTES # BLD AUTO: 0.91 K/UL
MONOCYTES NFR BLD AUTO: 9.2 %
NEUTROPHILS # BLD AUTO: 6.57 K/UL
NEUTROPHILS NFR BLD AUTO: 66.9 %
PLATELET # BLD AUTO: 251 K/UL
POTASSIUM SERPL-SCNC: 4.1 MMOL/L
PROT SERPL-MCNC: 7.7 G/DL
RBC # BLD: 5.31 M/UL
RBC # FLD: 14.9 %
SODIUM SERPL-SCNC: 140 MMOL/L
TRIGL SERPL-MCNC: 129 MG/DL
TSH SERPL-ACNC: 4.26 UIU/ML
WBC # FLD AUTO: 9.84 K/UL

## 2019-12-27 NOTE — ASSESSMENT
[FreeTextEntry1] : Venipuncture done in our office, Labs sent out.Patient advised to continue present medications with diet and exercise. Patient will follow up with specialists as scheduled. Patient will return to the office in 3months\par \par \par 24-hour urine was done 1/2019-sent to renal- Dr. Gong \par MA sent out\par specialists include..\par 1. cardiology-Dr. Eckert\par 2. ophthalmology-Dr.Goldberg-to call for last report\par 3. podiatry-NO LONGER GOES\par 4. apnea doctor-Dr. Live\par 5. gastro--Dr. Sanchez\par 6.-Dr. Michelle\par 7.Endocrine-Dr. Nino\par 8.renal-Dr. Gong\par colonoscopy 3/18\par abdominal sonogram=6/17\par discussed shingles vaccine/Declines pneumovax\par carotid sonogram was 11/16 with +plaque,no stenosis-rx given for followup\par Echo was 7/2019\par Chest x-ray was done 5/4/18 showing small left effusion=repeat 2/2018=clear\par Hep C screen has been done in the past\par CT lung screening not applicable as patient quit greater than 30 years ago\par CT lung done on 4/2019

## 2019-12-27 NOTE — ADDENDUM
[FreeTextEntry1] : Labs show\par -Diabetes not controlled-results sent to endocrinology\par -Creatinine of 1.8 with positive microalbumin= repeat 24-hour\par -TSH of 4.2, on amiodarone= monitor

## 2019-12-27 NOTE — HISTORY OF PRESENT ILLNESS
[FreeTextEntry1] : Patient presents for followup on hypertension/hyperlipidemia/type 2 diabetes. . Patient is currently on Cartia/Toprol/hydralazine for hypertension,On Crestor for hyperlipidemia and is on NovoLog/Lantus/glipizide for type 2 diabetes\par -Cardiology added hydralazine for uncontrolled hypertension\par \par \par \par \par  \par \par \par

## 2020-01-03 ENCOUNTER — LABORATORY RESULT (OUTPATIENT)
Age: 65
End: 2020-01-03

## 2020-01-03 ENCOUNTER — RX RENEWAL (OUTPATIENT)
Age: 65
End: 2020-01-03

## 2020-01-06 ENCOUNTER — TRANSCRIPTION ENCOUNTER (OUTPATIENT)
Age: 65
End: 2020-01-06

## 2020-01-06 ENCOUNTER — RESULT REVIEW (OUTPATIENT)
Age: 65
End: 2020-01-06

## 2020-01-06 LAB
BSA DERIVED: 1.73 M2
CREAT 24H UR-MCNC: 2.3 G/24 H
CREAT 24H UR-MCNC: 2.3 G/24 H
CREAT ?TM UR-MCNC: 84 MG/DL
CREAT ?TM UR-MCNC: 84 MG/DL
CREAT CL 24H UR+SERPL-VRATE: 77 ML/MIN
PROT 24H UR-MRATE: 24 MG/DL
PROT ?TM UR-MCNC: 24 HR
PROT ?TM UR-MCNC: 24 HR
PROT UR-MCNC: 660 MG/24 H
SPECIMEN VOL 24H UR: 2750 ML
SPECIMEN VOL 24H UR: 2750 ML

## 2020-01-11 ENCOUNTER — LABORATORY RESULT (OUTPATIENT)
Age: 65
End: 2020-01-11

## 2020-01-13 ENCOUNTER — APPOINTMENT (OUTPATIENT)
Dept: ENDOCRINOLOGY | Facility: CLINIC | Age: 65
End: 2020-01-13
Payer: COMMERCIAL

## 2020-01-13 VITALS
HEART RATE: 71 BPM | BODY MASS INDEX: 35.79 KG/M2 | WEIGHT: 250 LBS | HEIGHT: 70 IN | SYSTOLIC BLOOD PRESSURE: 130 MMHG | DIASTOLIC BLOOD PRESSURE: 80 MMHG

## 2020-01-13 PROCEDURE — 99214 OFFICE O/P EST MOD 30 MIN: CPT

## 2020-01-13 NOTE — HISTORY OF PRESENT ILLNESS
[FreeTextEntry1] : followup for DM2 . HTN, HLD. 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 \par He had partial nephrectomy for renal carcinoma. \par \par

## 2020-01-13 NOTE — ASSESSMENT
[Importance of Diet and Exercise] : importance of diet and exercise to improve glycemic control, achieve weight loss and improve cardiovascular health [Carbohydrate Consistent Diet] : carbohydrate consistent diet [Self Monitoring of Blood Glucose] : self monitoring of blood glucose [Insulin Self-Administration] : insulin self-administration [Retinopathy Screening] : Patient was referred to ophthalmology for retinopathy screening [FreeTextEntry1] : DM2 mildly uncontrolled with a1c 8.4 getting worse  with nephropathy, HTN , HLD , CHENEY.  Now s/p renal carcinoma resection B/L. \par Bgs am 100-137  lunch = 150-220 dinner = 190-256   bedtime 170-250  \par \par DM2 : pt admits eatinga  lot for holidays and he has hyeprG after meals. He admits he does not take his lunch dose. \par all lab results reviewed and discussed with patient. All questions answered\par continue for now glipizide XL 10 mg BID \par continue tresiba 50 u hS \par continue humalog to 6 u TID w meals , take all doses and will see him in 6 weeks and then increase if need be \par stopped night snacks\par discussed diet and exercise\par encouraged more exercise walking 30 min 3 x week\par Discussed complications of diabetes at length including MI/CVA/ESRD/dialysis/blindness/amputations/infections\par Can also try to have bedtime snack at night with protein to try to lower AM FS\par eye exam  August 2019 : reportedly no DR \par Bgs 4 x day \par GFR 39 but stable. He saw a nephrologist.  Referral to nephrology. \par has some microalbuminuria .he needs ACEI or ARB but i would wait nephrology input \par \par HTN:  bp at target on meds. Continue current management. \par \par HLD:  \par continue crestor 5 mg qd. \par LDl at target \par will repeat lipids in 3 mos \par \par obesity:  \par encouraged more exercise walking 30 min 3 x week\par discussed diet and exercise\par \par

## 2020-01-13 NOTE — PHYSICAL EXAM
[Alert] : alert [No Acute Distress] : no acute distress [Well Nourished] : well nourished [Normal Sclera/Conjunctiva] : normal sclera/conjunctiva [Well Developed] : well developed [No Proptosis] : no proptosis [EOMI] : extra ocular movement intact [Thyroid Not Enlarged] : the thyroid was not enlarged [No Thyroid Nodules] : there were no palpable thyroid nodules [Normal Oropharynx] : the oropharynx was normal [No Respiratory Distress] : no respiratory distress [No Accessory Muscle Use] : no accessory muscle use [Clear to Auscultation] : lungs were clear to auscultation bilaterally [Normal S1, S2] : normal S1 and S2 [Normal Rate] : heart rate was normal  [Regular Rhythm] : with a regular rhythm [Pedal Pulses Normal] : the pedal pulses are present [No Edema] : there was no peripheral edema [Normal Bowel Sounds] : normal bowel sounds [Not Tender] : non-tender [Soft] : abdomen soft [Not Distended] : not distended [Post Cervical Nodes] : posterior cervical nodes [Anterior Cervical Nodes] : anterior cervical nodes [Normal] : normal and non tender [Spine Straight] : spine straight [No Stigmata of Cushings Syndrome] : no stigmata of cushings syndrome [Normal Gait] : normal gait [Normal Strength/Tone] : muscle strength and tone were normal [No Rash] : no rash [No Tremors] : no tremors [Oriented x3] : oriented to person, place, and time [Acanthosis Nigricans] : no acanthosis nigricans [de-identified] : necrobiosis lipoidica both calves

## 2020-01-23 ENCOUNTER — TRANSCRIPTION ENCOUNTER (OUTPATIENT)
Age: 65
End: 2020-01-23

## 2020-01-31 ENCOUNTER — APPOINTMENT (OUTPATIENT)
Dept: OPHTHALMOLOGY | Facility: CLINIC | Age: 65
End: 2020-01-31

## 2020-03-02 ENCOUNTER — APPOINTMENT (OUTPATIENT)
Dept: INTERNAL MEDICINE | Facility: CLINIC | Age: 65
End: 2020-03-02
Payer: COMMERCIAL

## 2020-03-02 ENCOUNTER — TRANSCRIPTION ENCOUNTER (OUTPATIENT)
Age: 65
End: 2020-03-02

## 2020-03-02 VITALS
BODY MASS INDEX: 35.22 KG/M2 | WEIGHT: 246 LBS | SYSTOLIC BLOOD PRESSURE: 125 MMHG | HEART RATE: 70 BPM | HEIGHT: 70 IN | DIASTOLIC BLOOD PRESSURE: 80 MMHG

## 2020-03-02 DIAGNOSIS — L03.312 CELLULITIS OF BACK [ANY PART EXCEPT BUTTOCK]: ICD-10-CM

## 2020-03-02 DIAGNOSIS — Z87.828 PERSONAL HISTORY OF OTHER (HEALED) PHYSICAL INJURY AND TRAUMA: ICD-10-CM

## 2020-03-02 PROCEDURE — 36415 COLL VENOUS BLD VENIPUNCTURE: CPT

## 2020-03-02 PROCEDURE — 99213 OFFICE O/P EST LOW 20 MIN: CPT | Mod: 25

## 2020-03-03 ENCOUNTER — TRANSCRIPTION ENCOUNTER (OUTPATIENT)
Age: 65
End: 2020-03-03

## 2020-03-03 LAB
ALBUMIN SERPL ELPH-MCNC: 4.3 G/DL
ALP BLD-CCNC: 96 U/L
ALT SERPL-CCNC: 49 U/L
ANION GAP SERPL CALC-SCNC: 16 MMOL/L
AST SERPL-CCNC: 36 U/L
BASOPHILS # BLD AUTO: 0.04 K/UL
BASOPHILS NFR BLD AUTO: 0.4 %
BILIRUB SERPL-MCNC: 0.3 MG/DL
BUN SERPL-MCNC: 24 MG/DL
CALCIUM SERPL-MCNC: 9.6 MG/DL
CHLORIDE SERPL-SCNC: 108 MMOL/L
CHOLEST SERPL-MCNC: 185 MG/DL
CHOLEST/HDLC SERPL: 4.7 RATIO
CO2 SERPL-SCNC: 20 MMOL/L
CREAT SERPL-MCNC: 1.76 MG/DL
DIGOXIN SERPL-MCNC: <0.3 NG/ML
EOSINOPHIL # BLD AUTO: 0.15 K/UL
EOSINOPHIL NFR BLD AUTO: 1.7 %
ESTIMATED AVERAGE GLUCOSE: 174 MG/DL
GLUCOSE SERPL-MCNC: 151 MG/DL
HBA1C MFR BLD HPLC: 7.7 %
HCT VFR BLD CALC: 45.9 %
HDLC SERPL-MCNC: 40 MG/DL
HGB BLD-MCNC: 14.3 G/DL
IMM GRANULOCYTES NFR BLD AUTO: 0.7 %
LDLC SERPL CALC-MCNC: 112 MG/DL
LYMPHOCYTES # BLD AUTO: 1.88 K/UL
LYMPHOCYTES NFR BLD AUTO: 21 %
MAGNESIUM SERPL-MCNC: 1.9 MG/DL
MAN DIFF?: NORMAL
MCHC RBC-ENTMCNC: 27.7 PG
MCHC RBC-ENTMCNC: 31.2 GM/DL
MCV RBC AUTO: 88.8 FL
MONOCYTES # BLD AUTO: 0.74 K/UL
MONOCYTES NFR BLD AUTO: 8.3 %
NEUTROPHILS # BLD AUTO: 6.07 K/UL
NEUTROPHILS NFR BLD AUTO: 67.9 %
PLATELET # BLD AUTO: 237 K/UL
POTASSIUM SERPL-SCNC: 4.7 MMOL/L
PROT SERPL-MCNC: 7.3 G/DL
RBC # BLD: 5.17 M/UL
RBC # FLD: 14.6 %
SODIUM SERPL-SCNC: 143 MMOL/L
T4 FREE SERPL-MCNC: 1.2 NG/DL
TRIGL SERPL-MCNC: 166 MG/DL
TSH SERPL-ACNC: 2.67 UIU/ML
WBC # FLD AUTO: 8.94 K/UL

## 2020-03-03 NOTE — ADDENDUM
[FreeTextEntry1] : Labs show\par -BUN/creatinine improved at 24/1.7 = monitor\par -ALT elevated at 49 with known fatty liver, check one month

## 2020-03-03 NOTE — ASSESSMENT
[FreeTextEntry1] : Venipuncture done in our office, Labs sent out.Patient advised to continue present medications with diet and exercise. Patient will follow up with specialists as scheduled. Patient will return to the office in 3-4months for CP\par \par \par 24-hour urine was done 1/2020\par MA=12/2019\par specialists include..\par 1. cardiology-Dr. Eckert\par 2. ophthalmology-Dr.Goldberg-10/2019\par 3. podiatry-NO LONGER GOES\par 4. apnea doctor-Dr. Live\par 5. gastro--Dr. Sanchez\par 6.-Dr. Michelle\par 7.Endocrine-Dr. Nion\par 8.renal-Dr. Gong\par colonoscopy 3/18\par abdominal sonogram=6/2017\par discussed shingles vaccine/Declines pneumovax\par carotid sonogram was 11/16 with +plaque,no stenosis-"has rx, to do"\par Echo was 7/2019\par Chest x-ray was done 5/4/18 showing small left effusion=repeat 2/2018=clear\par Hep C screen has been done in the past\par CT lung screening not applicable as patient quit greater than 30 years ago\par CT lung done on 4/2019

## 2020-03-03 NOTE — HISTORY OF PRESENT ILLNESS
[FreeTextEntry1] : Patient presents for followup on hypertension/hyperlipidemia/type 2 diabetes. . Patient is currently on Cartia/Toprol/hydralazine for hypertension,On Crestor for hyperlipidemia and is on NovoLog/Lantus/glipizide for type 2 diabetes\par \par \par \par \par \par  \par \par \par

## 2020-03-04 NOTE — PACU DISCHARGE NOTE - THE ANESTHESIA ORDERS USED IN THE PACU ORDER SET WILL BE DISCONTINUED UPON TRANSFER OF THIS PATIENT
Pt called out requesting Risperdal, believed it was scheduled, not PRN. Also requested Zyrtec, for allergies related to a therapy dog visit. Reviewed the MAR, Risperdal is PRN and Zyrtec has not been ordered or given during this admission. After explaining, parent stated that 'we' typically had it together with medications. But 'you' didn't this time. I explained I had not been her nurse before, and she clarified it was the unit as a whole. Pt was given her PRN Risperdal per mother's request, mother then insisted on giving home allergy med and then gave it, even though it was not ordered or verified by pharmacy. Statement Selected

## 2020-03-12 ENCOUNTER — TRANSCRIPTION ENCOUNTER (OUTPATIENT)
Age: 65
End: 2020-03-12

## 2020-04-10 ENCOUNTER — TRANSCRIPTION ENCOUNTER (OUTPATIENT)
Age: 65
End: 2020-04-10

## 2020-05-21 ENCOUNTER — APPOINTMENT (OUTPATIENT)
Dept: ENDOCRINOLOGY | Facility: CLINIC | Age: 65
End: 2020-05-21

## 2020-06-15 ENCOUNTER — APPOINTMENT (OUTPATIENT)
Dept: INTERNAL MEDICINE | Facility: CLINIC | Age: 65
End: 2020-06-15
Payer: COMMERCIAL

## 2020-06-15 VITALS
BODY MASS INDEX: 35.5 KG/M2 | RESPIRATION RATE: 18 BRPM | HEIGHT: 70 IN | DIASTOLIC BLOOD PRESSURE: 80 MMHG | SYSTOLIC BLOOD PRESSURE: 130 MMHG | WEIGHT: 248 LBS | HEART RATE: 73 BPM

## 2020-06-15 DIAGNOSIS — Z86.19 PERSONAL HISTORY OF OTHER INFECTIOUS AND PARASITIC DISEASES: ICD-10-CM

## 2020-06-15 DIAGNOSIS — Z12.5 ENCOUNTER FOR SCREENING FOR MALIGNANT NEOPLASM OF PROSTATE: ICD-10-CM

## 2020-06-15 PROCEDURE — G0009: CPT

## 2020-06-15 PROCEDURE — 90732 PPSV23 VACC 2 YRS+ SUBQ/IM: CPT

## 2020-06-15 PROCEDURE — 36415 COLL VENOUS BLD VENIPUNCTURE: CPT

## 2020-06-15 PROCEDURE — G0442 ANNUAL ALCOHOL SCREEN 15 MIN: CPT | Mod: 59

## 2020-06-15 PROCEDURE — 99396 PREV VISIT EST AGE 40-64: CPT | Mod: 25

## 2020-06-15 NOTE — COUNSELING
[Weight management counseling provided] : Weight management [Healthy eating counseling provided] : healthy eating [Activity counseling provided] : activity [None] : None [Decrease Portions] : Decrease food portions [Needs reinforcement, provided] : Patient needs reinforcement on understanding lifestyle changes and  the steps needed to achieve self management goals and reinforcement was provided [Potential consequences of obesity discussed] : Potential consequences of obesity discussed [Benefits of weight loss discussed] : Benefits of weight loss discussed [Structured Weight Management Program suggested:] : Structured weight management program suggested [Encouraged to maintain food diary] : Encouraged to maintain food diary [Encouraged to increase physical activity] : Encouraged to increase physical activity

## 2020-06-17 LAB
ALBUMIN SERPL ELPH-MCNC: 4.2 G/DL
ALP BLD-CCNC: 98 U/L
ALT SERPL-CCNC: 45 U/L
ANION GAP SERPL CALC-SCNC: 17 MMOL/L
APPEARANCE: CLEAR
AST SERPL-CCNC: 45 U/L
BACTERIA: NEGATIVE
BASOPHILS # BLD AUTO: 0.05 K/UL
BASOPHILS NFR BLD AUTO: 0.5 %
BILIRUB SERPL-MCNC: 0.3 MG/DL
BILIRUBIN URINE: NEGATIVE
BLOOD URINE: NEGATIVE
BUN SERPL-MCNC: 24 MG/DL
CALCIUM SERPL-MCNC: 9.4 MG/DL
CHLORIDE SERPL-SCNC: 105 MMOL/L
CHOLEST SERPL-MCNC: 121 MG/DL
CHOLEST/HDLC SERPL: 3.3 RATIO
CO2 SERPL-SCNC: 18 MMOL/L
COLOR: YELLOW
CREAT SERPL-MCNC: 1.87 MG/DL
CREAT SPEC-SCNC: 119 MG/DL
EOSINOPHIL # BLD AUTO: 0.15 K/UL
EOSINOPHIL NFR BLD AUTO: 1.5 %
ESTIMATED AVERAGE GLUCOSE: 183 MG/DL
GLUCOSE QUALITATIVE U: NEGATIVE
GLUCOSE SERPL-MCNC: 143 MG/DL
HBA1C MFR BLD HPLC: 8 %
HCT VFR BLD CALC: 44.6 %
HCV RNA FLD QL NAA+PROBE: NORMAL
HCV RNA SPEC QL PROBE+SIG AMP: NOT DETECTED
HDLC SERPL-MCNC: 37 MG/DL
HGB BLD-MCNC: 13.8 G/DL
HYALINE CASTS: 0 /LPF
IMM GRANULOCYTES NFR BLD AUTO: 0.6 %
KETONES URINE: NEGATIVE
LDLC SERPL CALC-MCNC: 58 MG/DL
LEUKOCYTE ESTERASE URINE: NEGATIVE
LYMPHOCYTES # BLD AUTO: 1.8 K/UL
LYMPHOCYTES NFR BLD AUTO: 17.9 %
MAN DIFF?: NORMAL
MCHC RBC-ENTMCNC: 27.8 PG
MCHC RBC-ENTMCNC: 30.9 GM/DL
MCV RBC AUTO: 89.9 FL
MICROALBUMIN 24H UR DL<=1MG/L-MCNC: 52.5 MG/DL
MICROALBUMIN/CREAT 24H UR-RTO: 441 MG/G
MICROSCOPIC-UA: NORMAL
MONOCYTES # BLD AUTO: 0.89 K/UL
MONOCYTES NFR BLD AUTO: 8.8 %
NEUTROPHILS # BLD AUTO: 7.13 K/UL
NEUTROPHILS NFR BLD AUTO: 70.7 %
NITRITE URINE: NEGATIVE
PH URINE: 6
PLATELET # BLD AUTO: 251 K/UL
POTASSIUM SERPL-SCNC: 4.8 MMOL/L
PROT SERPL-MCNC: 7 G/DL
PROTEIN URINE: ABNORMAL
PSA SERPL-MCNC: 0.93 NG/ML
RBC # BLD: 4.96 M/UL
RBC # FLD: 15.1 %
RED BLOOD CELLS URINE: 5 /HPF
SODIUM SERPL-SCNC: 141 MMOL/L
SPECIFIC GRAVITY URINE: 1.02
SQUAMOUS EPITHELIAL CELLS: 0 /HPF
T4 FREE SERPL-MCNC: 1.2 NG/DL
TRIGL SERPL-MCNC: 134 MG/DL
TSH SERPL-ACNC: 3 UIU/ML
UROBILINOGEN URINE: NORMAL
WBC # FLD AUTO: 10.08 K/UL
WHITE BLOOD CELLS URINE: 3 /HPF

## 2020-06-17 NOTE — ASSESSMENT
[FreeTextEntry1] : 64-year-old male with dilated cardiomyopathy without any evidence of cardiac or vascular decompensation but continued need for lifestyle changes with better diet and exercise and weight loss.\par \par Long discussion with patient about needed lifestyle changes and better regimen patient with his diet to prevent episodes of hypoglycemia.\par \par The patient is now status post partial left and right partial nephrectomy for renal cell cancer who is currently stable\par \par For the present time patient is to continue present medications pending results of his blood work.\par \par Colonoscopy March 2018\par Ophthalmology one to 2 times per year\par Cardiology followup as scheduled\par CT scan of the lung April 2019\par renal ultrasound November 2019\par endocrinology followup\par \par Venipuncture done today in the office\par Followup in 3 months

## 2020-06-17 NOTE — HEALTH RISK ASSESSMENT
[Good] : ~his/her~  mood as  good [No falls in past year] : Patient reported no falls in the past year [0] : 2) Feeling down, depressed, or hopeless: Not at all (0) [2] : 2 [None] : None [With Significant Other] : lives with significant other [# of Members in Household ___] :  household currently consist of [unfilled] member(s) [Employed] : employed [# Of Children ___] : has [unfilled] children [Sexually Active] : sexually active [Feels Safe at Home] : Feels safe at home [Fully functional (bathing, dressing, toileting, transferring, walking, feeding)] : Fully functional (bathing, dressing, toileting, transferring, walking, feeding) [Fully functional (using the telephone, shopping, preparing meals, housekeeping, doing laundry, using] : Fully functional and needs no help or supervision to perform IADLs (using the telephone, shopping, preparing meals, housekeeping, doing laundry, using transportation, managing medications and managing finances) [Smoke Detector] : smoke detector [Carbon Monoxide Detector] : carbon monoxide detector [Seat Belt] :  uses seat belt [Sunscreen] : uses sunscreen [Reviewed no changes] : Reviewed no changes [Discussed at today's visit] : Advance Directives Discussed at today's visit [Designated Healthcare Proxy] : Designated healthcare proxy [Name: ___] : Health Care Proxy's Name: [unfilled]  [Yes] : Yes [2 - 4 times a month (2 pts)] : 2-4 times a month (2 points) [1 or 2 (0 pts)] : 1 or 2 (0 points) [Never (0 pts)] : Never (0 points) [] : No [de-identified] : active [OLK2Gjamy] : 0 [Audit-CScore] : 2 [de-identified] : good [LowDoseCTScan] : 04/19 [Change in mental status noted] : No change in mental status noted [Reports changes in dental health] : Reports no changes in dental health [Reports changes in vision] : Reports no changes in vision [Reports changes in hearing] : Reports no changes in hearing [HIVComments] : negative [HIVDate] : 07/14 [HepatitisCDate] : 02/00 [FreeTextEntry2] : Alisha [de-identified] : tinnitus [HepatitisCComments] : Patient with  hepatitis C [AdvancecareDate] : 06/20

## 2020-06-17 NOTE — ADDENDUM
[FreeTextEntry1] : Blood work other than hemoglobin A1c higher at 80 therefore increase Lantus to 55 units daily and patient to followup with endocrinology.\par Again stressed the need for diet and exercise.\par \par Creatinine about the same at 1.87 with positive urinary protein therefore do 24-hour urine for protein and creatinine clearance.

## 2020-06-17 NOTE — HISTORY OF PRESENT ILLNESS
[FreeTextEntry1] : 64-year-old male presents for his yearly physical with history of dilated cardiomyopathy as well as atrial fib for which he takes Eliquis. Atrial fibrillation rate was increased therefore cardiology added an amiodarone.\par Patient also with history of hypertension for which he is on Cartia, Furosemide and metoprolol\par \par Type 2 diabetes has been treated with Lantus and glipizide. endocrinology added NovoLog before each meal.\par The patient states ever since then gained weight.\par He has gained 20 pounds in the last year and states is watching his diet better but does not exercise regularly.\par \par Also history of obstructive sleep apnea for which the patient uses CPAP. Definite benefit\par \par Also history of hepatitis C which he is being followed by gastro-.\par \par Patient is generally feeling well without complaints including no chest pain, palpitations, shortness of breath or edema.\par \par The patient's other significant history is that he was diagnosed with a left renal cancer and had a partial left nephrectomy early April 2018.\par Postoperatively he has been somewhat winsome with patient had postoperative gross hematuria for which he needed to be readmitted with an embolization.\par Since he's had no further bleeding.\par .\par Interestingly the patient also had a right renal tumor for which he had a laparoscopic right partial nephrectomy April 2019 with it also being cancer.\par Postoperatively no complications\par The patient followed up with  November 2019 with renal ultrasound good\par \par Patient notes that his mother has been diagnosed with colon cancer and passed away \par Also 2 brothers with colon polyps.\par The patient had his colonoscopy March 2018\par

## 2020-06-17 NOTE — PHYSICAL EXAM
[General Appearance - Alert] : alert [General Appearance - In No Acute Distress] : in no acute distress [General Appearance - Well Nourished] : well nourished [Sclera] : the sclera and conjunctiva were normal [PERRL With Normal Accommodation] : pupils were equal in size, round, and reactive to light [Extraocular Movements] : extraocular movements were intact [Outer Ear] : the ears and nose were normal in appearance [Oropharynx] : the oropharynx was normal [Neck Appearance] : the appearance of the neck was normal [Neck Cervical Mass (___cm)] : no neck mass was observed [Jugular Venous Distention Increased] : there was no jugular-venous distention [Thyroid Nodule] : there were no palpable thyroid nodules [Thyroid Diffuse Enlargement] : the thyroid was not enlarged [Auscultation Breath Sounds / Voice Sounds] : lungs were clear to auscultation bilaterally [Heart Sounds Gallop] : no gallops [Heart Sounds] : normal S1 and S2 [Murmurs] : no murmurs [Heart Sounds Pericardial Friction Rub] : no pericardial rub [Abdominal Aorta] : the abdominal aorta was normal [Arterial Pulses Carotid] : carotid pulses were normal with no bruits [Arterial Pulses Femoral] : femoral pulses were normal without bruits [Veins - Varicosity Changes] : there were no varicosital changes [Edema] : there was no peripheral edema [Full Pulse] : the pedal pulses are present [Bowel Sounds] : normal bowel sounds [Abdomen Soft] : soft [Abdomen Tenderness] : non-tender [Abdomen Mass (___ Cm)] : no abdominal mass palpated [Cervical Lymph Nodes Enlarged Posterior Bilaterally] : posterior cervical [Patient Refused] : rectal exam was refused by the patient [Cervical Lymph Nodes Enlarged Anterior Bilaterally] : anterior cervical [Supraclavicular Lymph Nodes Enlarged Bilaterally] : supraclavicular [Femoral Lymph Nodes Enlarged Bilaterally] : femoral [Axillary Lymph Nodes Enlarged Bilaterally] : axillary [Inguinal Lymph Nodes Enlarged Bilaterally] : inguinal [No Spinal Tenderness] : no spinal tenderness [Abnormal Walk] : normal gait [Nail Clubbing] : no clubbing  or cyanosis of the fingernails [Musculoskeletal - Swelling] : no joint swelling seen [Skin Color & Pigmentation] : normal skin color and pigmentation [Motor Tone] : muscle strength and tone were normal [] : no rash [Skin Turgor] : normal skin turgor [Right Foot Was Examined] : right foot was examined [Left Foot Was Examined] : left foot was examined [Normal Appearance] : normal in appearance [Normal] : normal [Normal in Appearance] : normal in appearance [2+] : 2+ in the posterior tibialis [Cranial Nerves] : cranial nerves 2-12 were intact [No Focal Deficits] : no focal deficits [Oriented To Time, Place, And Person] : oriented to person, place, and time [Affect] : the affect was normal [Impaired Insight] : insight and judgment were intact [#1 Diminished] : number 1 was normal [#2 Diminished] : number 2 was normal [#4 Diminished] : number 4 was normal [#3 Diminished] : number 3 was normal [#5 Diminished] : number 5 was normal [#7 Diminished] : number 7 was normal [#8 Diminished] : number 8 was normal [#6 Diminished] : number 6 was normal [#9 Diminished] : number 9 was normal [FreeTextEntry1] : Bilateral chronic stasis changes, Abrasions bilateral mid-tibial areas with the right worse in the last with denuded skin and no ulcer and no sign of infection [#10 Diminished] : number 10 was normal

## 2020-06-17 NOTE — DATA REVIEWED
[FreeTextEntry1] : Echo July 2019 with EF at 40-45% with mild MR/TR, mild-moderate TR\par Holter monitor with atrial fibrillation

## 2020-06-18 ENCOUNTER — APPOINTMENT (OUTPATIENT)
Dept: PULMONOLOGY | Facility: CLINIC | Age: 65
End: 2020-06-18
Payer: COMMERCIAL

## 2020-06-18 VITALS
HEIGHT: 70 IN | OXYGEN SATURATION: 98 % | WEIGHT: 255 LBS | SYSTOLIC BLOOD PRESSURE: 130 MMHG | BODY MASS INDEX: 36.51 KG/M2 | HEART RATE: 84 BPM | DIASTOLIC BLOOD PRESSURE: 80 MMHG

## 2020-06-18 PROCEDURE — 99214 OFFICE O/P EST MOD 30 MIN: CPT

## 2020-06-18 RX ORDER — SILVER SULFADIAZINE 10 G/1000G
1 CREAM TOPICAL
Qty: 400 | Refills: 0 | Status: DISCONTINUED | COMMUNITY
Start: 2019-12-20

## 2020-06-18 NOTE — CONSULT LETTER
[Dear  ___] : Dear  [unfilled], [Consult Letter:] : I had the pleasure of evaluating your patient, [unfilled]. [Please see my note below.] : Please see my note below. [Consult Closing:] : Thank you very much for allowing me to participate in the care of this patient.  If you have any questions, please do not hesitate to contact me. [Sincerely,] : Sincerely, [Caleb Live MD] : Caleb Live MD

## 2020-06-18 NOTE — PHYSICAL EXAM
[Normal Conjunctiva] : the conjunctiva exhibited no abnormalities [Elongated Uvula] : elongated uvula [Neck Appearance] : the appearance of the neck was normal [Enlarged Base of the Tongue] : enlargement of the base of the tongue [II] : II [Jugular Venous Distention Increased] : there was no jugular-venous distention [Neck Cervical Mass (___cm)] : no neck mass was observed [Thyroid Diffuse Enlargement] : the thyroid was not enlarged [Neck Circumference: ___] : neck circumference is [unfilled] [Arterial Pulses Normal] : the arterial pulses were normal [Edema] : no peripheral edema present [Lungs Percussion] : the lungs were normal to percussion [Bowel Sounds] : normal bowel sounds [Abdomen Soft] : soft [Abdomen Tenderness] : non-tender [Abdomen Hernia] : no hernia was discovered [Abnormal Walk] : normal gait [Nail Clubbing] : no clubbing of the fingernails [Cyanosis, Localized] : no localized cyanosis [Skin Turgor] : normal skin turgor [Oriented To Time, Place, And Person] : oriented to person, place, and time [Impaired Insight] : insight and judgment were intact [Affect] : the affect was normal [Mood] : the mood was normal [General Appearance - In No Acute Distress] : in no acute distress [] : no respiratory distress [Respiration, Rhythm And Depth] : normal respiratory rhythm and effort [Heart Sounds] : normal S1 and S2 [Auscultation Breath Sounds / Voice Sounds] : lungs were clear to auscultation bilaterally [FreeTextEntry1] : + irreg with CVR

## 2020-06-18 NOTE — HISTORY OF PRESENT ILLNESS
[FreeTextEntry1] : 61 yo male with moderate to severe vaishali maintained on nocturnal nasal auto titrating cpap at 7-17 cm H20\par Excellent compliance (>8 hrs/night). AHI on cpap=1. No xs leak\par \par Partial nephrectomy for cancer at [facility] in April\par CT in February of 2018 with stable small L effusion and stable tiny intrapulmonic LNs\par \par Cardiomyopathy managed by Dr Morales\par Doing well with cpap\par \par 6/18/20\par Loves AutoPap\par Needs medium P10 mask, heated tubing and headgear - hasn't received though ordered

## 2020-06-22 ENCOUNTER — NON-APPOINTMENT (OUTPATIENT)
Age: 65
End: 2020-06-22

## 2020-06-22 ENCOUNTER — TRANSCRIPTION ENCOUNTER (OUTPATIENT)
Age: 65
End: 2020-06-22

## 2020-06-29 ENCOUNTER — NON-APPOINTMENT (OUTPATIENT)
Age: 65
End: 2020-06-29

## 2020-06-29 PROCEDURE — 92134 CPTRZ OPH DX IMG PST SGM RTA: CPT

## 2020-06-29 PROCEDURE — 92201 OPSCPY EXTND RTA DRAW UNI/BI: CPT

## 2020-06-29 PROCEDURE — 92012 INTRM OPH EXAM EST PATIENT: CPT

## 2020-07-07 ENCOUNTER — APPOINTMENT (OUTPATIENT)
Dept: ENDOCRINOLOGY | Facility: CLINIC | Age: 65
End: 2020-07-07
Payer: COMMERCIAL

## 2020-07-07 VITALS
HEIGHT: 70 IN | BODY MASS INDEX: 35.79 KG/M2 | SYSTOLIC BLOOD PRESSURE: 112 MMHG | DIASTOLIC BLOOD PRESSURE: 80 MMHG | WEIGHT: 250 LBS

## 2020-07-07 PROCEDURE — 99214 OFFICE O/P EST MOD 30 MIN: CPT

## 2020-07-07 RX ORDER — TRAMADOL HYDROCHLORIDE 50 MG/1
50 TABLET, COATED ORAL
Qty: 8 | Refills: 0 | Status: DISCONTINUED | COMMUNITY
Start: 2019-04-03 | End: 2020-07-07

## 2020-07-07 NOTE — PHYSICAL EXAM
[Alert] : alert [Well Nourished] : well nourished [Normal Sclera/Conjunctiva] : normal sclera/conjunctiva [No Acute Distress] : no acute distress [Thyroid Not Enlarged] : the thyroid was not enlarged [Normal Hearing] : hearing was normal [No Accessory Muscle Use] : no accessory muscle use [Clear to Auscultation] : lungs were clear to auscultation bilaterally [Normal S1, S2] : normal S1 and S2 [Not Tender] : non-tender [Normal Rate] : heart rate was normal [Normal Gait] : normal gait [Soft] : abdomen soft [No Rash] : no rash [No Tremors] : no tremors [de-identified] : +dusky lower extremities, +1 edema  [Oriented x3] : oriented to person, place, and time

## 2020-07-07 NOTE — HISTORY OF PRESENT ILLNESS
[FreeTextEntry1] : T2DM \par Severity: moderate \par Onset: with CHF\par Duration: 2005 jenae\par Modifying Factors: on insulin\par \par SMBG\par Checks BS fasting 1x a day\par On average 110-150\par Random meal times if he knows he made bad eating choices \par \par HGA1C 8.0 - worse but because of covid he was a lot less active the last 4 months \par \par Current drug regimen\par Glipizide 10 mg BID\par Tresiba 40 units QHS\par Humalog 6 units with high carb meals , most of the time he doesn’t give this \par \par Eye exam: Has apt next week \par Foot exam: sees podiatry- +numbness/tingling from a bad back \par Kidney disease: CKD, BUN 24, Cr 1.87, GFR 37\par Heart disease: +CHF, Sees Dr. Nair -[facility] \par \par Weight: stable recently, 30 lb gain in 1 year\par Diet\par B: egg muffin homemade\par L: sandwich on white bread \par D: meat, veggie, starch - rare pizza\par has bad self control \par Exercise: active around the house \par Smoking: denies\par \par HTN\par BP in office 112/80\par Hydralazine 10 mg BID \par Metoprolol 100 mg BID\par \par HLD\par Cholesterol at goal\par Crestor 5 mg daily

## 2020-07-07 NOTE — REVIEW OF SYSTEMS
[Blurred Vision] : blurred vision [Shortness Of Breath] : shortness of breath [SOB on Exertion] : shortness of breath on exertion [Nocturia] : nocturia [Tremors] : tremors [Polydipsia] : polydipsia [Heat Intolerance] : heat intolerance [Fatigue] : no fatigue [Recent Weight Gain (___ Lbs)] : no recent weight gain [Recent Weight Loss (___ Lbs)] : no recent weight loss [Visual Field Defect] : no visual field defect [Dysphagia] : no dysphagia [Neck Pain] : no neck pain [Dysphonia] : no dysphonia [Chest Pain] : no chest pain [Palpitations] : no palpitations [Nausea] : no nausea [Constipation] : no constipation [Vomiting] : no vomiting [Headaches] : no headaches [Diarrhea] : no diarrhea [Polyuria] : no polyuria [Cold Intolerance] : no cold intolerance [FreeTextEntry3] : +double vision  [FreeTextEntry6] : +CHF  [de-identified] : when BS low  [FreeTextEntry8] : 1x  [de-identified] : +metallic taste in mouth

## 2020-07-07 NOTE — ASSESSMENT
[FreeTextEntry1] : T2DM\par -DM control slightly worse due to COVID but pt attributes to a much more sedentary lifestyle the last 4 months (no work)\par -He was not injecting his meal time insulin with meals (only if he hate pizza for example) however on diet recall, majority of his meals require insulin/have carbs. Increase compliance with meal time insulin. \par -Continue to check BS at least 2x a day and bring logs to office in 2 weeks, note if he has any low blood sugars, what he is eating, and if he exercised that day\par -Increase exercise as tolerated, goal of 30 mins a day 5x a week\par -Continue to follow up with specalisits including ophtho, nephrology, cardiology \par \par HTN\par -Continue BP regimen, BP stable in office\par \par \par HLD \par -Continue statin, cholesterol at goal\par \par \par Blood sugar log drop off to be done in  2 weeks\par Labs before next visit\par RTO 3 months

## 2020-08-03 ENCOUNTER — APPOINTMENT (OUTPATIENT)
Dept: OPHTHALMOLOGY | Facility: CLINIC | Age: 65
End: 2020-08-03
Payer: COMMERCIAL

## 2020-08-03 ENCOUNTER — NON-APPOINTMENT (OUTPATIENT)
Age: 65
End: 2020-08-03

## 2020-08-03 PROCEDURE — 92201 OPSCPY EXTND RTA DRAW UNI/BI: CPT

## 2020-08-03 PROCEDURE — 92134 CPTRZ OPH DX IMG PST SGM RTA: CPT

## 2020-08-03 PROCEDURE — 92012 INTRM OPH EXAM EST PATIENT: CPT

## 2020-08-07 ENCOUNTER — LABORATORY RESULT (OUTPATIENT)
Age: 65
End: 2020-08-07

## 2020-08-10 LAB
BSA DERIVED: 1.73 M2
CREAT 24H UR-MCNC: 1.9 G/24 H
CREAT 24H UR-MCNC: 1.9 G/24 H
CREAT ?TM UR-MCNC: 81 MG/DL
CREAT ?TM UR-MCNC: 81 MG/DL
CREAT CL 24H UR+SERPL-VRATE: 74 ML/MIN
PROT 24H UR-MRATE: 44 MG/DL
PROT ?TM UR-MCNC: 24 HR
PROT ?TM UR-MCNC: 24 HR
PROT UR-MCNC: 1056 MG/24 H
SPECIMEN VOL 24H UR: 2400 ML
SPECIMEN VOL 24H UR: 2400 ML

## 2020-09-04 NOTE — H&P PST ADULT - PRO INTERPRETER NEED 2
English Skin normal color for race, warm, dry and intact. No evidence of rash. Skin normal color for race, warm, dry and intact. No evidence of rash. ABOVE

## 2020-09-14 NOTE — COUNSELING
[Needs reinforcement, provided] : Patient needs reinforcement on understanding of disease, goals and obesity follow-up plan; reinforcement was provided [Potential consequences of obesity discussed] : Potential consequences of obesity discussed

## 2020-09-15 ENCOUNTER — APPOINTMENT (OUTPATIENT)
Dept: INTERNAL MEDICINE | Facility: CLINIC | Age: 65
End: 2020-09-15
Payer: COMMERCIAL

## 2020-09-15 ENCOUNTER — MED ADMIN CHARGE (OUTPATIENT)
Age: 65
End: 2020-09-15

## 2020-09-15 VITALS
DIASTOLIC BLOOD PRESSURE: 80 MMHG | SYSTOLIC BLOOD PRESSURE: 134 MMHG | RESPIRATION RATE: 15 BRPM | BODY MASS INDEX: 36.16 KG/M2 | WEIGHT: 252 LBS | HEART RATE: 70 BPM | OXYGEN SATURATION: 98 % | TEMPERATURE: 98.4 F

## 2020-09-15 DIAGNOSIS — Z23 ENCOUNTER FOR IMMUNIZATION: ICD-10-CM

## 2020-09-15 PROCEDURE — 99213 OFFICE O/P EST LOW 20 MIN: CPT | Mod: 25

## 2020-09-15 PROCEDURE — 36415 COLL VENOUS BLD VENIPUNCTURE: CPT

## 2020-09-15 PROCEDURE — G0008: CPT

## 2020-09-15 PROCEDURE — 90686 IIV4 VACC NO PRSV 0.5 ML IM: CPT

## 2020-09-15 NOTE — ASSESSMENT
[FreeTextEntry1] : Flu vaccine given without side effects or reaction.Venipuncture done in our office, Labs sent out.Patient advised to continue present medications with diet and exercise. Patient will follow up with specialists as scheduled. Patient will return to the office in 3-4months\par \par \par 24-hour urine was done 8/2020\par MA=6/2020\par specialists include..\par 1. cardiology-Dr. Eckert\par 2. ophthalmology-Dr.Goldberg-10/2019\par 3. podiatry-NO LONGER GOES\par 4. apnea doctor-Dr. Live\par 5. gastro--Dr. Sanchez\par 6.-Dr. Michelle\par 7.Endocrine-Dr. Nino\par 8.renal-Dr. Gong\par colonoscopy 3/18\par abdominal sonogram=6/2017\par discussed shingles vaccine\par carotid sonogram was 11/16 with +plaque,no stenosis-"has rx, to do"\par Echo was 7/2019\par Chest x-ray was done 5/4/18 showing small left effusion=repeat 2/2018=clear\par Hep C screen has been done in the past\par CT lung screening not applicable as patient quit greater than 30 years ago\par CT lung done on 4/2019 with +lung nodule-rx given for followup

## 2020-09-15 NOTE — HISTORY OF PRESENT ILLNESS
[FreeTextEntry1] : Patient presents for followup on hypertension/hyperlipidemia/type 2 diabetes. Patient is currently on Cartia/Toprol/hydralazine for hypertension,On Crestor for hyperlipidemia and is on NovoLog/Lantus/glipizide for type 2 diabetes\par -feels well w/o complaints\par \par \par \par \par \par  \par \par \par

## 2020-09-15 NOTE — PHYSICAL EXAM
[General Appearance - In No Acute Distress] : in no acute distress [Auscultation Breath Sounds / Voice Sounds] : lungs were clear to auscultation bilaterally [Respiration, Rhythm And Depth] : normal respiratory rhythm and effort [] : no respiratory distress [Affect] : the affect was normal [Mood] : the mood was normal [FreeTextEntry1] : + irreg with CVR

## 2020-09-16 ENCOUNTER — TRANSCRIPTION ENCOUNTER (OUTPATIENT)
Age: 65
End: 2020-09-16

## 2020-09-16 LAB
ALBUMIN SERPL ELPH-MCNC: 4.2 G/DL
ALP BLD-CCNC: 107 U/L
ALT SERPL-CCNC: 45 U/L
ANION GAP SERPL CALC-SCNC: 15 MMOL/L
AST SERPL-CCNC: 33 U/L
BASOPHILS # BLD AUTO: 0.06 K/UL
BASOPHILS NFR BLD AUTO: 0.6 %
BILIRUB SERPL-MCNC: 0.2 MG/DL
BUN SERPL-MCNC: 30 MG/DL
CALCIUM SERPL-MCNC: 9.3 MG/DL
CHLORIDE SERPL-SCNC: 108 MMOL/L
CHOLEST SERPL-MCNC: 129 MG/DL
CHOLEST/HDLC SERPL: 3.4 RATIO
CO2 SERPL-SCNC: 20 MMOL/L
CREAT SERPL-MCNC: 1.84 MG/DL
DIGOXIN SERPL-MCNC: 0.5 NG/ML
EOSINOPHIL # BLD AUTO: 0.13 K/UL
EOSINOPHIL NFR BLD AUTO: 1.3 %
ESTIMATED AVERAGE GLUCOSE: 189 MG/DL
GLUCOSE SERPL-MCNC: 177 MG/DL
HBA1C MFR BLD HPLC: 8.2 %
HCT VFR BLD CALC: 42.7 %
HDLC SERPL-MCNC: 38 MG/DL
HGB BLD-MCNC: 13.9 G/DL
IMM GRANULOCYTES NFR BLD AUTO: 0.6 %
LDLC SERPL CALC-MCNC: 61 MG/DL
LYMPHOCYTES # BLD AUTO: 1.73 K/UL
LYMPHOCYTES NFR BLD AUTO: 17.4 %
MAGNESIUM SERPL-MCNC: 1.9 MG/DL
MAN DIFF?: NORMAL
MCHC RBC-ENTMCNC: 28.3 PG
MCHC RBC-ENTMCNC: 32.6 GM/DL
MCV RBC AUTO: 86.8 FL
MONOCYTES # BLD AUTO: 0.81 K/UL
MONOCYTES NFR BLD AUTO: 8.2 %
NEUTROPHILS # BLD AUTO: 7.14 K/UL
NEUTROPHILS NFR BLD AUTO: 71.9 %
PLATELET # BLD AUTO: 228 K/UL
POTASSIUM SERPL-SCNC: 4.2 MMOL/L
PROT SERPL-MCNC: 7.2 G/DL
RBC # BLD: 4.92 M/UL
RBC # FLD: 14.8 %
SODIUM SERPL-SCNC: 143 MMOL/L
TRIGL SERPL-MCNC: 146 MG/DL
TSH SERPL-ACNC: 2.44 UIU/ML
WBC # FLD AUTO: 9.93 K/UL

## 2020-09-25 ENCOUNTER — APPOINTMENT (OUTPATIENT)
Dept: NEPHROLOGY | Facility: CLINIC | Age: 65
End: 2020-09-25
Payer: COMMERCIAL

## 2020-09-25 VITALS — DIASTOLIC BLOOD PRESSURE: 90 MMHG | SYSTOLIC BLOOD PRESSURE: 120 MMHG

## 2020-09-25 VITALS
TEMPERATURE: 98.3 F | BODY MASS INDEX: 35.93 KG/M2 | DIASTOLIC BLOOD PRESSURE: 90 MMHG | SYSTOLIC BLOOD PRESSURE: 140 MMHG | HEIGHT: 70 IN | HEART RATE: 60 BPM | WEIGHT: 251 LBS

## 2020-09-25 PROCEDURE — 36415 COLL VENOUS BLD VENIPUNCTURE: CPT

## 2020-09-25 PROCEDURE — 99214 OFFICE O/P EST MOD 30 MIN: CPT | Mod: 25

## 2020-09-25 RX ORDER — PEN NEEDLE, DIABETIC 32GX 5/32"
32G X 4 MM NEEDLE, DISPOSABLE MISCELLANEOUS
Qty: 4 | Refills: 0 | Status: DISCONTINUED | COMMUNITY
Start: 2019-10-30 | End: 2020-09-25

## 2020-09-25 NOTE — HISTORY OF PRESENT ILLNESS
[FreeTextEntry1] : REF : Proteinuria,\par \par DM, X 10 years, S/P Partial L - Nephrectomy ( RCC )  F/U US - Reviewed\par \par Non Ischemic Cardiomyopathy, HTN, \par \par PAF, On AC, \par \par HTN Management - Per Cardiology , Rate Control,

## 2020-09-25 NOTE — ASSESSMENT
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 Nino )\par \par eGFR Stable > 1 Year, \par \par * More (versus less) intensive blood pressure goals are typically recommended in patients with diabetes. Refer to UpToDate content on treatment of hypertension in diabetic patients and on goal blood pressure in adults.\par ¶ Glycemic control targets are typically individualized, but an A1c goal of <7% is frequently recommended. Refer to UpToDate content on glycemic control in patients with type 1 and type 2 diabetes.\par ? Most patients with diabetic kidney disease are at high cardiovascular risk and therefore should be treated with statin therapy. Refer to UpToDate content on low-density lipoprotein cholesterol lowering for primary and secondary, and on medical, care of diabetic patients.\par ? After adjusting the patient's therapy and measuring the response at an appropriate time interval, this algorithm can be used again to make further adjustments to the therapeutic regimen (if not already maximized). Reduced dietary sodium and/or use of a diuretic, in combination with an ACE inhibitor or ARB, can increase the likelihood of achieving proteinuria goals.\par § SGLT2 inhibitors increase the risk of genital infections and may also be associated with a higher incidence of lower limb amputations (although such complications are uncommon). Patients with a prior history of or risk factors for genital infections or lower limb amputation may reasonably choose to not take an SGLT2 inhibitor.\par ¥ SGLT2 inhibitors reduce the risk of kidney disease progression and end-stage renal disease in patients with diabetic kidney disease, regardless of the degree of proteinuria. However, patients with severely increased albuminuria (albumin-to-creatinine ratio =300 mg/g) are at higher risk for kidney disease progression and end-stage renal disease and therefore derive a greater absolute benefit from therapy with SGLT2 inhibitors. Patients with normoalbuminuria or moderately increased albuminuria have a lower absolute risk for progression and therefore derive a smaller absolute benefit. Thus, for some patients at lower absolute risk for progression, the benefits and harms of taking an SGLT2 inhibitor may be more closely balanced

## 2020-09-25 NOTE — PHYSICAL EXAM
[General Appearance - Alert] : alert [General Appearance - In No Acute Distress] : in no acute distress [General Appearance - Well Nourished] : well nourished [General Appearance - Well Developed] : well developed [General Appearance - Well-Appearing] : healthy appearing [Sclera] : the sclera and conjunctiva were normal [PERRL With Normal Accommodation] : pupils were equal in size, round, and reactive to light [Extraocular Movements] : extraocular movements were intact [Strabismus] : no strabismus was seen [Outer Ear] : the ears and nose were normal in appearance [Hearing Threshold Finger Rub Not Lea] : hearing was normal [Examination Of The Oral Cavity] : the lips and gums were normal [Both Tympanic Membranes Were Examined] : both tympanic membranes were normal [Nasal Cavity] : the nasal mucosa and septum were normal [Oropharynx] : the oropharynx was normal [Neck Appearance] : the appearance of the neck was normal [Neck Cervical Mass (___cm)] : no neck mass was observed [Jugular Venous Distention Increased] : there was no jugular-venous distention [Thyroid Diffuse Enlargement] : the thyroid was not enlarged [Thyroid Nodule] : there were no palpable thyroid nodules [Exaggerated Use Of Accessory Muscles For Inspiration] : no accessory muscle use [Auscultation Breath Sounds / Voice Sounds] : lungs were clear to auscultation bilaterally [Chest Palpation] : palpation of the chest revealed no abnormalities [Lungs Percussion] : the lungs were normal to percussion [Heart Rate And Rhythm] : heart rate was normal and rhythm regular [Heart Sounds] : normal S1 and S2 [Heart Sounds Gallop] : no gallops [Murmurs] : no murmurs [Heart Sounds Pericardial Friction Rub] : no pericardial rub [Full Pulse] : the pedal pulses are present [Edema] : there was no peripheral edema [Bowel Sounds] : normal bowel sounds [Abdomen Soft] : soft [Abdomen Tenderness] : non-tender [Abdomen Mass (___ Cm)] : no abdominal mass palpated [Cervical Lymph Nodes Enlarged Posterior Bilaterally] : posterior cervical [Cervical Lymph Nodes Enlarged Anterior Bilaterally] : anterior cervical [Supraclavicular Lymph Nodes Enlarged Bilaterally] : supraclavicular [Axillary Lymph Nodes Enlarged Bilaterally] : axillary [Femoral Lymph Nodes Enlarged Bilaterally] : femoral [Inguinal Lymph Nodes Enlarged Bilaterally] : inguinal [No CVA Tenderness] : no ~M costovertebral angle tenderness [No Spinal Tenderness] : no spinal tenderness [Abnormal Walk] : normal gait [Nail Clubbing] : no clubbing  or cyanosis of the fingernails [Musculoskeletal - Swelling] : no joint swelling seen [Motor Tone] : muscle strength and tone were normal [Skin Color & Pigmentation] : normal skin color and pigmentation [Skin Turgor] : normal skin turgor [] : no rash [Deep Tendon Reflexes (DTR)] : deep tendon reflexes were 2+ and symmetric [Sensation] : the sensory exam was normal to light touch and pinprick [No Focal Deficits] : no focal deficits [Oriented To Time, Place, And Person] : oriented to person, place, and time [Impaired Insight] : insight and judgment were intact [Affect] : the affect was normal [FreeTextEntry1] : AF,

## 2020-09-28 LAB
25(OH)D3 SERPL-MCNC: 39.5 NG/ML
ALBUMIN SERPL ELPH-MCNC: 4.1 G/DL
ANION GAP SERPL CALC-SCNC: 16 MMOL/L
APPEARANCE: CLEAR
BACTERIA: NEGATIVE
BASOPHILS # BLD AUTO: 0.05 K/UL
BASOPHILS NFR BLD AUTO: 0.5 %
BILIRUBIN URINE: NEGATIVE
BLOOD URINE: NEGATIVE
BUN SERPL-MCNC: 24 MG/DL
CALCIUM SERPL-MCNC: 9.2 MG/DL
CALCIUM SERPL-MCNC: 9.2 MG/DL
CHLORIDE SERPL-SCNC: 106 MMOL/L
CO2 SERPL-SCNC: 20 MMOL/L
COLOR: YELLOW
CREAT SERPL-MCNC: 1.96 MG/DL
CREAT SPEC-SCNC: 211 MG/DL
CREAT/PROT UR: 0.5 RATIO
EOSINOPHIL # BLD AUTO: 0.14 K/UL
EOSINOPHIL NFR BLD AUTO: 1.4 %
GLUCOSE QUALITATIVE U: NEGATIVE
GLUCOSE SERPL-MCNC: 228 MG/DL
HCT VFR BLD CALC: 44.3 %
HGB BLD-MCNC: 13.9 G/DL
HYALINE CASTS: 2 /LPF
IMM GRANULOCYTES NFR BLD AUTO: 0.7 %
KETONES URINE: NORMAL
LEUKOCYTE ESTERASE URINE: NEGATIVE
LYMPHOCYTES # BLD AUTO: 1.62 K/UL
LYMPHOCYTES NFR BLD AUTO: 15.7 %
MAN DIFF?: NORMAL
MCHC RBC-ENTMCNC: 28.2 PG
MCHC RBC-ENTMCNC: 31.4 GM/DL
MCV RBC AUTO: 89.9 FL
MICROSCOPIC-UA: NORMAL
MONOCYTES # BLD AUTO: 0.78 K/UL
MONOCYTES NFR BLD AUTO: 7.6 %
NEUTROPHILS # BLD AUTO: 7.67 K/UL
NEUTROPHILS NFR BLD AUTO: 74.1 %
NITRITE URINE: NEGATIVE
PARATHYROID HORMONE INTACT: 53 PG/ML
PH URINE: 5.5
PHOSPHATE SERPL-MCNC: 3.6 MG/DL
PLATELET # BLD AUTO: 222 K/UL
POTASSIUM SERPL-SCNC: 4.6 MMOL/L
PROT UR-MCNC: 109 MG/DL
PROTEIN URINE: ABNORMAL
RBC # BLD: 4.93 M/UL
RBC # FLD: 15.1 %
RED BLOOD CELLS URINE: 2 /HPF
SODIUM SERPL-SCNC: 142 MMOL/L
SPECIFIC GRAVITY URINE: 1.02
SQUAMOUS EPITHELIAL CELLS: 1 /HPF
UROBILINOGEN URINE: NORMAL
WBC # FLD AUTO: 10.33 K/UL
WHITE BLOOD CELLS URINE: 3 /HPF

## 2020-10-08 ENCOUNTER — APPOINTMENT (OUTPATIENT)
Dept: CT IMAGING | Facility: CLINIC | Age: 65
End: 2020-10-08
Payer: COMMERCIAL

## 2020-10-08 ENCOUNTER — APPOINTMENT (OUTPATIENT)
Dept: ULTRASOUND IMAGING | Facility: CLINIC | Age: 65
End: 2020-10-08
Payer: COMMERCIAL

## 2020-10-08 ENCOUNTER — OUTPATIENT (OUTPATIENT)
Dept: OUTPATIENT SERVICES | Facility: HOSPITAL | Age: 65
LOS: 1 days | End: 2020-10-08
Payer: COMMERCIAL

## 2020-10-08 DIAGNOSIS — I48.91 UNSPECIFIED ATRIAL FIBRILLATION: Chronic | ICD-10-CM

## 2020-10-08 DIAGNOSIS — Z98.890 OTHER SPECIFIED POSTPROCEDURAL STATES: Chronic | ICD-10-CM

## 2020-10-08 DIAGNOSIS — Z90.5 ACQUIRED ABSENCE OF KIDNEY: Chronic | ICD-10-CM

## 2020-10-08 DIAGNOSIS — Z00.00 ENCOUNTER FOR GENERAL ADULT MEDICAL EXAMINATION WITHOUT ABNORMAL FINDINGS: ICD-10-CM

## 2020-10-08 DIAGNOSIS — R91.8 OTHER NONSPECIFIC ABNORMAL FINDING OF LUNG FIELD: ICD-10-CM

## 2020-10-08 PROCEDURE — 93880 EXTRACRANIAL BILAT STUDY: CPT

## 2020-10-08 PROCEDURE — 71250 CT THORAX DX C-: CPT | Mod: 26

## 2020-10-08 PROCEDURE — 93880 EXTRACRANIAL BILAT STUDY: CPT | Mod: 26

## 2020-10-08 PROCEDURE — 71250 CT THORAX DX C-: CPT

## 2020-10-09 LAB
25(OH)D3 SERPL-MCNC: 30.8 NG/ML
ALBUMIN SERPL ELPH-MCNC: 4 G/DL
ALP BLD-CCNC: 101 U/L
ALT SERPL-CCNC: 37 U/L
ANION GAP SERPL CALC-SCNC: 18 MMOL/L
AST SERPL-CCNC: 33 U/L
BASOPHILS # BLD AUTO: 0.06 K/UL
BASOPHILS NFR BLD AUTO: 0.6 %
BILIRUB SERPL-MCNC: 0.3 MG/DL
BUN SERPL-MCNC: 29 MG/DL
CALCIUM SERPL-MCNC: 9 MG/DL
CHLORIDE SERPL-SCNC: 107 MMOL/L
CHOLEST SERPL-MCNC: 128 MG/DL
CHOLEST/HDLC SERPL: 3.6 RATIO
CO2 SERPL-SCNC: 19 MMOL/L
CREAT SERPL-MCNC: 1.97 MG/DL
CREAT SPEC-SCNC: 119 MG/DL
EOSINOPHIL # BLD AUTO: 0.13 K/UL
EOSINOPHIL NFR BLD AUTO: 1.3 %
ESTIMATED AVERAGE GLUCOSE: 183 MG/DL
GLUCOSE SERPL-MCNC: 227 MG/DL
HBA1C MFR BLD HPLC: 8 %
HCT VFR BLD CALC: 44 %
HDLC SERPL-MCNC: 36 MG/DL
HGB BLD-MCNC: 14.3 G/DL
IMM GRANULOCYTES NFR BLD AUTO: 0.8 %
LDLC SERPL CALC-MCNC: 45 MG/DL
LYMPHOCYTES # BLD AUTO: 1.53 K/UL
LYMPHOCYTES NFR BLD AUTO: 15.1 %
MAN DIFF?: NORMAL
MCHC RBC-ENTMCNC: 28.4 PG
MCHC RBC-ENTMCNC: 32.5 GM/DL
MCV RBC AUTO: 87.3 FL
MICROALBUMIN 24H UR DL<=1MG/L-MCNC: 43.9 MG/DL
MICROALBUMIN/CREAT 24H UR-RTO: 368 MG/G
MONOCYTES # BLD AUTO: 0.83 K/UL
MONOCYTES NFR BLD AUTO: 8.2 %
NEUTROPHILS # BLD AUTO: 7.53 K/UL
NEUTROPHILS NFR BLD AUTO: 74 %
PLATELET # BLD AUTO: 217 K/UL
POTASSIUM SERPL-SCNC: 4.5 MMOL/L
PROT SERPL-MCNC: 7.1 G/DL
RBC # BLD: 5.04 M/UL
RBC # FLD: 14.7 %
SODIUM SERPL-SCNC: 144 MMOL/L
T3FREE SERPL-MCNC: 2.85 PG/ML
T4 FREE SERPL-MCNC: 1.2 NG/DL
TRIGL SERPL-MCNC: 238 MG/DL
TSH SERPL-ACNC: 1.78 UIU/ML
VIT B12 SERPL-MCNC: 723 PG/ML
WBC # FLD AUTO: 10.16 K/UL

## 2020-10-12 ENCOUNTER — TRANSCRIPTION ENCOUNTER (OUTPATIENT)
Age: 65
End: 2020-10-12

## 2020-10-12 ENCOUNTER — APPOINTMENT (OUTPATIENT)
Dept: ENDOCRINOLOGY | Facility: CLINIC | Age: 65
End: 2020-10-12
Payer: COMMERCIAL

## 2020-10-12 VITALS
BODY MASS INDEX: 36.36 KG/M2 | WEIGHT: 254 LBS | SYSTOLIC BLOOD PRESSURE: 140 MMHG | DIASTOLIC BLOOD PRESSURE: 96 MMHG | HEIGHT: 70 IN

## 2020-10-12 VITALS — TEMPERATURE: 95 F

## 2020-10-12 VITALS — SYSTOLIC BLOOD PRESSURE: 112 MMHG | DIASTOLIC BLOOD PRESSURE: 80 MMHG

## 2020-10-12 PROCEDURE — 99214 OFFICE O/P EST MOD 30 MIN: CPT

## 2020-10-12 NOTE — PHYSICAL EXAM
[Alert] : alert [Well Nourished] : well nourished [No Acute Distress] : no acute distress [Normal Sclera/Conjunctiva] : normal sclera/conjunctiva [Normal Hearing] : hearing was normal [Thyroid Not Enlarged] : the thyroid was not enlarged [No Accessory Muscle Use] : no accessory muscle use [Clear to Auscultation] : lungs were clear to auscultation bilaterally [Normal S1, S2] : normal S1 and S2 [Normal Rate] : heart rate was normal [Not Tender] : non-tender [Soft] : abdomen soft [Normal Gait] : normal gait [No Rash] : no rash [No Tremors] : no tremors [Oriented x3] : oriented to person, place, and time [de-identified] : +dusky lower extremities, +1 edema

## 2020-10-12 NOTE — HISTORY OF PRESENT ILLNESS
[FreeTextEntry1] : T2DM \par Severity: moderate \par Onset: with CHF\par Duration: 2005 jenae\par Modifying Factors: on insulin\par \par SMBG\par Checks BS fasting 1-3x a day\par Logs scanned in\par Random meal times if he knows he made bad eating choices \par \par HGA1C 8.0 but recently started making great improvements \par \par Current drug regimen\par Glipizide 10 mg BID\par Tresiba 50 units QHS\par Humalog 10 units with meals \par \par Eye exam: Summer 2020, denies DR \par Foot exam: sees podiatry- +numbness/tingling from a bad back \par Kidney disease: CKD, BUN 29, Cr 1.97, GFR 35\par Heart disease: +CHF, Sees Dr. Nair -[facility] \par \par Weight: stable recently, 30 lb gain in 1 year\par Diet\par B: egg muffin homemade\par L: sandwich on white bread \par D: meat, veggie, starch - rare pizza\par has bad self control \par Exercise: active around the house but does not exercise\par Smoking: denies\par \par HTN\par BP in office 140/96 then repeat 110/80\par Hydralazine 10 mg BID \par Metoprolol 100 mg BID\par \par HLD\par Triglyceride 238, Total cholesterol 128, HDL 36, LDL 45 \par Just started fish oil 1000 mg BID \par Crestor 5 mg daily

## 2020-10-12 NOTE — ASSESSMENT
[FreeTextEntry1] : T2DM\par -Logs presented today show pt blood sugars meet goal on his current regimen. He is having some post prandial lows. He will take 10 units of Humalog if he is having a normal carb mean, 5 units if a low carb meal, and 12 units with pizza. \par -Continue all other diabetic regimen \par -Continue to check BS at least 2x a day and bring logs to office in 2 weeks, note if he has any low blood sugars, what he is eating, and if he exercised that day\par -Increase exercise as tolerated, goal of 30 mins a day 5x a week\par -Continue to follow up with specialists including ophtho, nephrology, cardiology \par \par HTN\par -Continue BP regimen, BP stable in office\par \par \par HLD \par -Continue statin, cholesterol at goal\par \par \par Blood sugar log drop off to be done in  2 weeks\par Labs before next visit (done by other team members)\par RTO 3 months

## 2020-10-27 ENCOUNTER — RX RENEWAL (OUTPATIENT)
Age: 65
End: 2020-10-27

## 2020-10-30 ENCOUNTER — APPOINTMENT (OUTPATIENT)
Dept: NEPHROLOGY | Facility: CLINIC | Age: 65
End: 2020-10-30
Payer: COMMERCIAL

## 2020-10-30 VITALS
HEIGHT: 70 IN | WEIGHT: 157 LBS | DIASTOLIC BLOOD PRESSURE: 84 MMHG | TEMPERATURE: 97.2 F | SYSTOLIC BLOOD PRESSURE: 142 MMHG | HEART RATE: 60 BPM | BODY MASS INDEX: 22.48 KG/M2

## 2020-10-30 VITALS — SYSTOLIC BLOOD PRESSURE: 150 MMHG | DIASTOLIC BLOOD PRESSURE: 90 MMHG

## 2020-10-30 PROCEDURE — 99072 ADDL SUPL MATRL&STAF TM PHE: CPT

## 2020-10-30 PROCEDURE — 36415 COLL VENOUS BLD VENIPUNCTURE: CPT

## 2020-10-30 PROCEDURE — 99214 OFFICE O/P EST MOD 30 MIN: CPT | Mod: 25

## 2020-10-30 NOTE — PHYSICAL EXAM
[General Appearance - Alert] : alert [General Appearance - In No Acute Distress] : in no acute distress [General Appearance - Well Nourished] : well nourished [General Appearance - Well Developed] : well developed [General Appearance - Well-Appearing] : healthy appearing [Sclera] : the sclera and conjunctiva were normal [PERRL With Normal Accommodation] : pupils were equal in size, round, and reactive to light [Extraocular Movements] : extraocular movements were intact [Strabismus] : no strabismus was seen [Outer Ear] : the ears and nose were normal in appearance [Hearing Threshold Finger Rub Not [place]] : hearing was normal [Examination Of The Oral Cavity] : the lips and gums were normal [Both Tympanic Membranes Were Examined] : both tympanic membranes were normal [Nasal Cavity] : the nasal mucosa and septum were normal [Oropharynx] : the oropharynx was normal [Neck Appearance] : the appearance of the neck was normal [Neck Cervical Mass (___cm)] : no neck mass was observed [Jugular Venous Distention Increased] : there was no jugular-venous distention [Thyroid Diffuse Enlargement] : the thyroid was not enlarged [Thyroid Nodule] : there were no palpable thyroid nodules [Exaggerated Use Of Accessory Muscles For Inspiration] : no accessory muscle use [Auscultation Breath Sounds / Voice Sounds] : lungs were clear to auscultation bilaterally [Chest Palpation] : palpation of the chest revealed no abnormalities [Lungs Percussion] : the lungs were normal to percussion [Heart Rate And Rhythm] : heart rate was normal and rhythm regular [Heart Sounds] : normal S1 and S2 [Heart Sounds Gallop] : no gallops [Murmurs] : no murmurs [Heart Sounds Pericardial Friction Rub] : no pericardial rub [Full Pulse] : the pedal pulses are present [Edema] : there was no peripheral edema [Bowel Sounds] : normal bowel sounds [Abdomen Soft] : soft [Abdomen Tenderness] : non-tender [Abdomen Mass (___ Cm)] : no abdominal mass palpated [Cervical Lymph Nodes Enlarged Posterior Bilaterally] : posterior cervical [Cervical Lymph Nodes Enlarged Anterior Bilaterally] : anterior cervical [Supraclavicular Lymph Nodes Enlarged Bilaterally] : supraclavicular [Axillary Lymph Nodes Enlarged Bilaterally] : axillary [Femoral Lymph Nodes Enlarged Bilaterally] : femoral [Inguinal Lymph Nodes Enlarged Bilaterally] : inguinal [No CVA Tenderness] : no ~M costovertebral angle tenderness [No Spinal Tenderness] : no spinal tenderness [Abnormal Walk] : normal gait [Nail Clubbing] : no clubbing  or cyanosis of the fingernails [Musculoskeletal - Swelling] : no joint swelling seen [Motor Tone] : muscle strength and tone were normal [Skin Color & Pigmentation] : normal skin color and pigmentation [Skin Turgor] : normal skin turgor [] : no rash [Deep Tendon Reflexes (DTR)] : deep tendon reflexes were 2+ and symmetric [Sensation] : the sensory exam was normal to light touch and pinprick [No Focal Deficits] : no focal deficits [Oriented To Time, Place, And Person] : oriented to person, place, and time [Impaired Insight] : insight and judgment were intact [Affect] : the affect was normal [FreeTextEntry1] : AF,

## 2020-10-30 NOTE — ASSESSMENT
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 Nino )\par \par eGFR Stable > 1 Year, \par \par * More (versus less) intensive blood pressure goals are typically recommended in patients with diabetes. Refer to UpToDate content on treatment of hypertension in diabetic patients and on goal blood pressure in adults.\par ¶ Glycemic control targets are typically individualized, but an A1c goal of <7% is frequently recommended. Refer to UpToDate content on glycemic control in patients with type 1 and type 2 diabetes.\par ? Most patients with diabetic kidney disease are at high cardiovascular risk and therefore should be treated with statin therapy. Refer to UpToDate content on low-density lipoprotein cholesterol lowering for primary and secondary, and on medical, care of diabetic patients.\par ? After adjusting the patient's therapy and measuring the response at an appropriate time interval, this algorithm can be used again to make further adjustments to the therapeutic regimen (if not already maximized). Reduced dietary sodium and/or use of a diuretic, in combination with an ACE inhibitor or ARB, can increase the likelihood of achieving proteinuria goals.\par § SGLT2 inhibitors increase the risk of genital infections and may also be associated with a higher incidence of lower limb amputations (although such complications are uncommon). Patients with a prior history of or risk factors for genital infections or lower limb amputation may reasonably choose to not take an SGLT2 inhibitor.\par ¥ SGLT2 inhibitors reduce the risk of kidney disease progression and end-stage renal disease in patients with diabetic kidney disease, regardless of the degree of proteinuria. However, patients with severely increased albuminuria (albumin-to-creatinine ratio =300 mg/g) are at higher risk for kidney disease progression and end-stage renal disease and therefore derive a greater absolute benefit from therapy with SGLT2 inhibitors. Patients with normoalbuminuria or moderately increased albuminuria have a lower absolute risk for progression and therefore derive a smaller absolute benefit. Thus, for some patients at lower absolute risk for progression, the benefits and harms of taking an SGLT2 inhibitor may be more closely balanced,\par \par D/W the wife, \par \par Will D/W ,

## 2020-11-03 LAB
25(OH)D3 SERPL-MCNC: 41.6 NG/ML
ALBUMIN SERPL ELPH-MCNC: 4 G/DL
ANION GAP SERPL CALC-SCNC: 14 MMOL/L
BASOPHILS # BLD AUTO: 0.06 K/UL
BASOPHILS NFR BLD AUTO: 0.6 %
BUN SERPL-MCNC: 23 MG/DL
CALCIUM SERPL-MCNC: 9.2 MG/DL
CALCIUM SERPL-MCNC: 9.2 MG/DL
CHLORIDE SERPL-SCNC: 103 MMOL/L
CO2 SERPL-SCNC: 24 MMOL/L
CREAT SERPL-MCNC: 1.8 MG/DL
CREAT SPEC-SCNC: 147 MG/DL
CREAT/PROT UR: 0.6 RATIO
EOSINOPHIL # BLD AUTO: 0.13 K/UL
EOSINOPHIL NFR BLD AUTO: 1.3 %
GLUCOSE SERPL-MCNC: 225 MG/DL
HCT VFR BLD CALC: 46.5 %
HGB BLD-MCNC: 14.2 G/DL
IMM GRANULOCYTES NFR BLD AUTO: 0.9 %
LYMPHOCYTES # BLD AUTO: 1.56 K/UL
LYMPHOCYTES NFR BLD AUTO: 15.5 %
MAN DIFF?: NORMAL
MCHC RBC-ENTMCNC: 27.8 PG
MCHC RBC-ENTMCNC: 30.5 GM/DL
MCV RBC AUTO: 91 FL
MONOCYTES # BLD AUTO: 0.79 K/UL
MONOCYTES NFR BLD AUTO: 7.8 %
NEUTROPHILS # BLD AUTO: 7.44 K/UL
NEUTROPHILS NFR BLD AUTO: 73.9 %
PARATHYROID HORMONE INTACT: 47 PG/ML
PHOSPHATE SERPL-MCNC: 3.7 MG/DL
PLATELET # BLD AUTO: 222 K/UL
POTASSIUM SERPL-SCNC: 4.5 MMOL/L
PROT UR-MCNC: 92 MG/DL
RBC # BLD: 5.11 M/UL
RBC # FLD: 15.1 %
SODIUM SERPL-SCNC: 140 MMOL/L
WBC # FLD AUTO: 10.07 K/UL

## 2020-11-06 ENCOUNTER — OUTPATIENT (OUTPATIENT)
Dept: OUTPATIENT SERVICES | Facility: HOSPITAL | Age: 65
LOS: 1 days | End: 2020-11-06
Payer: COMMERCIAL

## 2020-11-06 ENCOUNTER — APPOINTMENT (OUTPATIENT)
Dept: UROLOGY | Facility: CLINIC | Age: 65
End: 2020-11-06
Payer: COMMERCIAL

## 2020-11-06 VITALS
WEIGHT: 251 LBS | HEIGHT: 70 IN | SYSTOLIC BLOOD PRESSURE: 141 MMHG | DIASTOLIC BLOOD PRESSURE: 87 MMHG | HEART RATE: 68 BPM | RESPIRATION RATE: 17 BRPM | BODY MASS INDEX: 35.93 KG/M2

## 2020-11-06 VITALS — TEMPERATURE: 98 F

## 2020-11-06 DIAGNOSIS — Z90.5 ACQUIRED ABSENCE OF KIDNEY: Chronic | ICD-10-CM

## 2020-11-06 DIAGNOSIS — C65.1 MALIGNANT NEOPLASM OF RIGHT RENAL PELVIS: ICD-10-CM

## 2020-11-06 DIAGNOSIS — Z98.890 OTHER SPECIFIED POSTPROCEDURAL STATES: Chronic | ICD-10-CM

## 2020-11-06 DIAGNOSIS — R35.0 FREQUENCY OF MICTURITION: ICD-10-CM

## 2020-11-06 DIAGNOSIS — I48.91 UNSPECIFIED ATRIAL FIBRILLATION: Chronic | ICD-10-CM

## 2020-11-06 PROCEDURE — 76775 US EXAM ABDO BACK WALL LIM: CPT | Mod: 26

## 2020-11-06 PROCEDURE — 99072 ADDL SUPL MATRL&STAF TM PHE: CPT

## 2020-11-06 PROCEDURE — 76775 US EXAM ABDO BACK WALL LIM: CPT

## 2020-11-06 PROCEDURE — 99214 OFFICE O/P EST MOD 30 MIN: CPT | Mod: 25

## 2020-11-06 NOTE — PHYSICAL EXAM
[FreeTextEntry1] : obese Recommend weight loss [Heart Rate And Rhythm] : Heart rate and rhythm were normal [] : no respiratory distress [Oriented To Time, Place, And Person] : oriented to person, place, and time [Normal Station and Gait] : the gait and station were normal for the patient's age [No Focal Deficits] : no focal deficits [No Palpable Adenopathy] : no palpable adenopathy

## 2020-11-06 NOTE — ASSESSMENT
[FreeTextEntry1] : Renal ultrasound done today . No evidence of recurrence . \par Discussion regarding weight loss and GFR . he is being seen by Endocrinologist and Nephrologist . They are planning his care together due to a new medication for his GFR . \par

## 2020-11-09 DIAGNOSIS — C64.9 MALIGNANT NEOPLASM OF UNSPECIFIED KIDNEY, EXCEPT RENAL PELVIS: ICD-10-CM

## 2020-11-09 DIAGNOSIS — C65.1 MALIGNANT NEOPLASM OF RIGHT RENAL PELVIS: ICD-10-CM

## 2020-11-17 ENCOUNTER — APPOINTMENT (OUTPATIENT)
Dept: ENDOCRINOLOGY | Facility: CLINIC | Age: 65
End: 2020-11-17
Payer: COMMERCIAL

## 2020-11-17 VITALS
OXYGEN SATURATION: 98 % | RESPIRATION RATE: 16 BRPM | BODY MASS INDEX: 37.08 KG/M2 | WEIGHT: 259 LBS | TEMPERATURE: 98.2 F | SYSTOLIC BLOOD PRESSURE: 130 MMHG | HEIGHT: 70 IN | DIASTOLIC BLOOD PRESSURE: 80 MMHG | HEART RATE: 66 BPM

## 2020-11-17 PROCEDURE — 99214 OFFICE O/P EST MOD 30 MIN: CPT

## 2020-11-17 NOTE — ASSESSMENT
[Carbohydrate Consistent Diet] : carbohydrate consistent diet [Importance of Diet and Exercise] : importance of diet and exercise to improve glycemic control, achieve weight loss and improve cardiovascular health [Exercise/Effect on Glucose] : exercise/effect on glucose [FreeTextEntry1] : DM2 mildly uncontrolled in patient with nephropathy, HTN , HLD , [place].  Now s/p renal carcinoma resection B/L. \par Gaiend 8 lbs. \par \par DM2 : He admits he does not take his lunch dose. Advised to stop adjusting insulin doses on his own. \par logbook revised and discussed w patient. He still has sweets and he was advsied to quit them \par continue for now glipizide XL 10 mg BID \par continue tresiba 45 u hS \par continue humalog to 8-10-12 u TDI w meals. \par add jardiance for better Bgs control and cardiac and renal benefit. \par He has CKD stage Iv and we hope to improve by adding sglt2 \par encouraged more exercise walking 30 min 3 x week\par eye exam  referral \par Bgs 4 x day \par GFR 39 but stable. \par has some microalbuminuria .\par \par HTN:   bp at target on meds. Continue current management.\par I advised low fat/low cholesterol diet, low salt diet, and weight loss\par \par HLD:  LDL at target.\par low fat/low cholesterol diet and weight loss advised\par continue crestor 5 mg qd. \par \par obesity:  encouraged more exercise walking 30 min 3 x week\par discussed diet and exercise\par \par

## 2020-12-15 ENCOUNTER — APPOINTMENT (OUTPATIENT)
Dept: INTERNAL MEDICINE | Facility: CLINIC | Age: 65
End: 2020-12-15
Payer: COMMERCIAL

## 2020-12-15 VITALS
WEIGHT: 246 LBS | HEIGHT: 70 IN | SYSTOLIC BLOOD PRESSURE: 135 MMHG | DIASTOLIC BLOOD PRESSURE: 80 MMHG | TEMPERATURE: 97.7 F | RESPIRATION RATE: 14 BRPM | OXYGEN SATURATION: 98 % | HEART RATE: 67 BPM | BODY MASS INDEX: 35.22 KG/M2

## 2020-12-15 LAB
ALBUMIN SERPL ELPH-MCNC: 4.3 G/DL
ALP BLD-CCNC: 109 U/L
ALT SERPL-CCNC: 48 U/L
ANION GAP SERPL CALC-SCNC: 13 MMOL/L
AST SERPL-CCNC: 33 U/L
BASOPHILS # BLD AUTO: 0.05 K/UL
BASOPHILS NFR BLD AUTO: 0.5 %
BILIRUB SERPL-MCNC: 0.3 MG/DL
BUN SERPL-MCNC: 24 MG/DL
CALCIUM SERPL-MCNC: 9.7 MG/DL
CHLORIDE SERPL-SCNC: 107 MMOL/L
CHOLEST SERPL-MCNC: 146 MG/DL
CO2 SERPL-SCNC: 24 MMOL/L
CREAT SERPL-MCNC: 2.09 MG/DL
DIGOXIN SERPL-MCNC: <0.3 NG/ML
EOSINOPHIL # BLD AUTO: 0.12 K/UL
EOSINOPHIL NFR BLD AUTO: 1.3 %
ESTIMATED AVERAGE GLUCOSE: 157 MG/DL
GLUCOSE SERPL-MCNC: 153 MG/DL
HBA1C MFR BLD HPLC: 7.1 %
HCT VFR BLD CALC: 50.6 %
HDLC SERPL-MCNC: 45 MG/DL
HGB BLD-MCNC: 15.6 G/DL
IMM GRANULOCYTES NFR BLD AUTO: 0.5 %
LDLC SERPL CALC-MCNC: 76 MG/DL
LYMPHOCYTES # BLD AUTO: 1.49 K/UL
LYMPHOCYTES NFR BLD AUTO: 15.7 %
MAGNESIUM SERPL-MCNC: 2.2 MG/DL
MAN DIFF?: NORMAL
MCHC RBC-ENTMCNC: 28 PG
MCHC RBC-ENTMCNC: 30.8 GM/DL
MCV RBC AUTO: 90.8 FL
MONOCYTES # BLD AUTO: 0.8 K/UL
MONOCYTES NFR BLD AUTO: 8.4 %
NEUTROPHILS # BLD AUTO: 6.96 K/UL
NEUTROPHILS NFR BLD AUTO: 73.6 %
NONHDLC SERPL-MCNC: 101 MG/DL
PLATELET # BLD AUTO: 257 K/UL
POTASSIUM SERPL-SCNC: 4.8 MMOL/L
PROT SERPL-MCNC: 7.4 G/DL
RBC # BLD: 5.57 M/UL
RBC # FLD: 15 %
SODIUM SERPL-SCNC: 144 MMOL/L
TRIGL SERPL-MCNC: 126 MG/DL
TSH SERPL-ACNC: 2.85 UIU/ML
WBC # FLD AUTO: 9.47 K/UL

## 2020-12-15 PROCEDURE — 99072 ADDL SUPL MATRL&STAF TM PHE: CPT

## 2020-12-15 PROCEDURE — 36415 COLL VENOUS BLD VENIPUNCTURE: CPT

## 2020-12-15 PROCEDURE — 99213 OFFICE O/P EST LOW 20 MIN: CPT | Mod: 25

## 2020-12-15 NOTE — HISTORY OF PRESENT ILLNESS
[FreeTextEntry1] : Patient presents for followup on hypertension/hyperlipidemia/type 2 diabetes. Patient is currently on Cartia/Toprol/hydralazine for hypertension,On Crestor for hyperlipidemia and is on NovoLog/Lantus/glipizide/jardiance for type 2 diabetes\par -feels well w/o complaints\par -weight has gone down "watching"\par \par \par \par \par \par  \par \par \par

## 2020-12-15 NOTE — ASSESSMENT
[FreeTextEntry1] : Venipuncture done in our office, Labs sent out.Patient advised to continue present medications with diet and exercise. Patient will follow up with specialists as scheduled. Patient will return to the office in 3-4months\par \par \par 24-hour urine was done 8/2020\par MA=6/2020\par specialists include..\par 1. cardiology-Dr. Eckert\par 2. ophthalmology-Dr.Goldberg-10/2020\par 3. podiatry-NO LONGER GOES\par 4. apnea doctor-Dr. Live\par 5. gastro--Dr. Sanchez\par 6.-Dr. Michelle\par 7.Endocrine-Dr. Nino\par 8.renal-Dr. Gong\par colonoscopy 3/18\par abdominal sonogram=6/2017\par discussed shingles vaccine\par carotid sonogram was 10/2020=no stenosis\par Echo was 7/2019\par Hep C screen has been done in the past\par CT lung screening not applicable as patient quit greater than 30 years ago\par CT lung done on 10/2020 =stable lung nodule

## 2020-12-15 NOTE — ADDENDUM
[FreeTextEntry1] : Labs show\par -BUN/creat worse at 24/2.09\par -ALT of 48 with known fatty liver\par check in 3 weeks/sees renal

## 2020-12-28 ENCOUNTER — APPOINTMENT (OUTPATIENT)
Dept: PULMONOLOGY | Facility: CLINIC | Age: 65
End: 2020-12-28
Payer: COMMERCIAL

## 2020-12-28 VITALS — OXYGEN SATURATION: 98 % | SYSTOLIC BLOOD PRESSURE: 120 MMHG | DIASTOLIC BLOOD PRESSURE: 78 MMHG | HEART RATE: 74 BPM

## 2020-12-28 PROCEDURE — 99072 ADDL SUPL MATRL&STAF TM PHE: CPT

## 2020-12-28 PROCEDURE — 99214 OFFICE O/P EST MOD 30 MIN: CPT

## 2020-12-28 NOTE — HISTORY OF PRESENT ILLNESS
[FreeTextEntry1] : 63 yo male with moderate to severe vaishali maintained on nocturnal nasal auto titrating cpap at 7-17 cm H20\par Excellent compliance (>8 hrs/night). AHI on cpap=1. No xs leak\par \par Partial nephrectomy for cancer at [facility] in April\par CT in February of 2018 with stable small L effusion and stable tiny intrapulmonic LNs\par \par Cardiomyopathy managed by Dr Morales\par Doing well with cpap\par \par 6/18/20\par Loves AutoPap\par Needs medium P10 mask, heated tubing and headgear - hasn't received though ordered \par \par 12/28/20\par No new supplies since 2018

## 2020-12-28 NOTE — PHYSICAL EXAM
[Normal Conjunctiva] : the conjunctiva exhibited no abnormalities [Elongated Uvula] : elongated uvula [Enlarged Base of the Tongue] : enlargement of the base of the tongue [II] : II [Neck Appearance] : the appearance of the neck was normal [Neck Cervical Mass (___cm)] : no neck mass was observed [Jugular Venous Distention Increased] : there was no jugular-venous distention [Thyroid Diffuse Enlargement] : the thyroid was not enlarged [Neck Circumference: ___] : neck circumference is [unfilled] [Arterial Pulses Normal] : the arterial pulses were normal [Edema] : no peripheral edema present [Lungs Percussion] : the lungs were normal to percussion [Bowel Sounds] : normal bowel sounds [Abdomen Soft] : soft [Abdomen Tenderness] : non-tender [Abdomen Hernia] : no hernia was discovered [Abnormal Walk] : normal gait [Nail Clubbing] : no clubbing of the fingernails [Cyanosis, Localized] : no localized cyanosis [Skin Turgor] : normal skin turgor [Oriented To Time, Place, And Person] : oriented to person, place, and time [Impaired Insight] : insight and judgment were intact [Affect] : the affect was normal [Mood] : the mood was normal [General Appearance - In No Acute Distress] : in no acute distress [] : no respiratory distress [Respiration, Rhythm And Depth] : normal respiratory rhythm and effort [Auscultation Breath Sounds / Voice Sounds] : lungs were clear to auscultation bilaterally [Heart Sounds] : normal S1 and S2 [FreeTextEntry1] : + irreg with CVR

## 2020-12-31 ENCOUNTER — APPOINTMENT (OUTPATIENT)
Dept: INTERNAL MEDICINE | Facility: CLINIC | Age: 65
End: 2020-12-31
Payer: COMMERCIAL

## 2020-12-31 ENCOUNTER — LABORATORY RESULT (OUTPATIENT)
Age: 65
End: 2020-12-31

## 2020-12-31 VITALS
OXYGEN SATURATION: 97 % | HEART RATE: 70 BPM | DIASTOLIC BLOOD PRESSURE: 80 MMHG | WEIGHT: 252 LBS | HEIGHT: 70 IN | TEMPERATURE: 97.7 F | BODY MASS INDEX: 36.08 KG/M2 | SYSTOLIC BLOOD PRESSURE: 125 MMHG | RESPIRATION RATE: 13 BRPM

## 2020-12-31 PROCEDURE — 99213 OFFICE O/P EST LOW 20 MIN: CPT | Mod: 25

## 2020-12-31 PROCEDURE — 99072 ADDL SUPL MATRL&STAF TM PHE: CPT

## 2020-12-31 PROCEDURE — 36415 COLL VENOUS BLD VENIPUNCTURE: CPT

## 2021-01-05 LAB
ALBUMIN SERPL ELPH-MCNC: 4.2 G/DL
ALP BLD-CCNC: 115 U/L
ALT SERPL-CCNC: 66 U/L
ANION GAP SERPL CALC-SCNC: 14 MMOL/L
AST SERPL-CCNC: 41 U/L
BILIRUB DIRECT SERPL-MCNC: 0.1 MG/DL
BILIRUB INDIRECT SERPL-MCNC: 0.2 MG/DL
BILIRUB SERPL-MCNC: 0.3 MG/DL
BUN SERPL-MCNC: 25 MG/DL
CALCIUM SERPL-MCNC: 9.8 MG/DL
CHLORIDE SERPL-SCNC: 106 MMOL/L
CO2 SERPL-SCNC: 23 MMOL/L
CREAT SERPL-MCNC: 2.04 MG/DL
GLUCOSE SERPL-MCNC: 143 MG/DL
HCV AB SER QL: REACTIVE
HCV S/CO RATIO: 11.49 S/CO
POTASSIUM SERPL-SCNC: 4.7 MMOL/L
PROT SERPL-MCNC: 7.6 G/DL
SODIUM SERPL-SCNC: 143 MMOL/L

## 2021-01-05 NOTE — ADDENDUM
[FreeTextEntry1] : Labs show\par -Creatinine of 2.0\par -AST/ALT enzymes elevated at 41/66\par -Hepatitis C antibody positive with negative RNA\par Plan\par -Abdominal sonogram

## 2021-01-05 NOTE — ASSESSMENT
[FreeTextEntry1] : Venipuncture done in our office, Labs sent out.Patient advised to continue present medications with diet and exercise. Patient will follow up with specialists as scheduled. Patient will return to the office as sched for reg check\par \par \par 24-hour urine was done 8/2020\par MA=6/2020\par specialists include..\par 1. cardiology-Dr. Eckert\par 2. ophthalmology-Dr.Goldberg-10/2020\par 3. podiatry-NO LONGER GOES\par 4. apnea doctor-Dr. Live\par 5. gastro--Dr. Sanchez\par 6.-Dr. Michelle\par 7.Endocrine-Dr. Nino\par 8.renal-Dr. Gong\par colonoscopy 3/18\par abdominal sonogram=6/2017\par discussed shingles vaccine\par carotid sonogram was 10/2020=no stenosis\par Echo was 7/2019\par Hep C screen has been done in the past=repeat today\par CT lung screening not applicable as patient quit greater than 30 years ago\par CT lung done on 10/2020 =stable lung nodule

## 2021-01-05 NOTE — HISTORY OF PRESENT ILLNESS
[FreeTextEntry1] : Patient presents for followup on hypertension/repeat labs. Patient is currently on Cartia/Toprol/hydralazine for hypertension and was found to have BUN/creat of 24/2.09 and ALT of 48 with known fatty liver. \par -abnormal results d/w pt and questions answered\par -feels well\par \par \par \par \par \par  \par \par \par

## 2021-01-07 ENCOUNTER — LABORATORY RESULT (OUTPATIENT)
Age: 66
End: 2021-01-07

## 2021-01-11 ENCOUNTER — APPOINTMENT (OUTPATIENT)
Dept: ENDOCRINOLOGY | Facility: CLINIC | Age: 66
End: 2021-01-11

## 2021-01-12 ENCOUNTER — APPOINTMENT (OUTPATIENT)
Dept: ENDOCRINOLOGY | Facility: CLINIC | Age: 66
End: 2021-01-12
Payer: COMMERCIAL

## 2021-01-12 VITALS
BODY MASS INDEX: 36.08 KG/M2 | TEMPERATURE: 97.4 F | HEART RATE: 64 BPM | DIASTOLIC BLOOD PRESSURE: 100 MMHG | SYSTOLIC BLOOD PRESSURE: 178 MMHG | OXYGEN SATURATION: 97 % | HEIGHT: 70 IN | WEIGHT: 252 LBS

## 2021-01-12 PROCEDURE — 99072 ADDL SUPL MATRL&STAF TM PHE: CPT

## 2021-01-12 PROCEDURE — 99214 OFFICE O/P EST MOD 30 MIN: CPT

## 2021-01-12 NOTE — PHYSICAL EXAM
[Alert] : alert [Well Nourished] : well nourished [No Acute Distress] : no acute distress [Well Developed] : well developed [Normal Sclera/Conjunctiva] : normal sclera/conjunctiva [EOMI] : extra ocular movement intact [No Proptosis] : no proptosis [Normal Oropharynx] : the oropharynx was normal [Thyroid Not Enlarged] : the thyroid was not enlarged [No Thyroid Nodules] : no palpable thyroid nodules [No Accessory Muscle Use] : no accessory muscle use [No Respiratory Distress] : no respiratory distress [Clear to Auscultation] : lungs were clear to auscultation bilaterally [Normal S1, S2] : normal S1 and S2 [Normal Rate] : heart rate was normal [Regular Rhythm] : with a regular rhythm [No Edema] : no peripheral edema [Pedal Pulses Normal] : the pedal pulses are present [Normal Bowel Sounds] : normal bowel sounds [Not Tender] : non-tender [Not Distended] : not distended [Soft] : abdomen soft [Normal Anterior Cervical Nodes] : no anterior cervical lymphadenopathy [Spine Straight] : spine straight [Normal Gait] : normal gait [No Rash] : no rash [No Tremors] : no tremors [Oriented x3] : oriented to person, place, and time [Acanthosis Nigricans] : no acanthosis nigricans

## 2021-01-12 NOTE — ASSESSMENT
[Carbohydrate Consistent Diet] : carbohydrate consistent diet [Exercise/Effect on Glucose] : exercise/effect on glucose [FreeTextEntry1] : DM2 mildly uncontrolled in patient with nephropathy, HTN , HLD , CHENEY.  \par Now s/p renal carcinoma resection B/L. \par \par DM2 : logbook reviewed and bgs decent. He gained 10 lbs due to holidays and he has better diet habits so he cut down on meal doses.  STop all sweets and is better. But eating bagels again quite often and  3x day  \par He did diet education \par continue for now glipizide XL 10 mg BID \par continue jardiance 10 mg qd \par decrease  tresiba 38 u HS u HS  due to some morning hypoG \par increase humalog 6-10-14 u TID w meals. \par nephropathy  CKD stage Iv : continue to monitor with nephrology. \par encouraged more exercise walking 30 min 3 x week\par eye exam  done ; reportedly no DR \par flushot done  \par Bgs 4 x day \par has some microalbuminuria \par CHENEY : monitor LFts.\par \par HTN:   high. Cardiology is managing and actively trying to control lately. If symptomatic he needs to go to ER \par Advised pt to monitor at home. I advised low fat/low cholesterol diet, low salt diet, and weight loss\par \par HLD:  LDL at target.\par low fat/low cholesterol diet and weight loss advised\par continue crestor 5 mg qd. \par \par obesity:  encouraged more exercise walking 30 min 3 x week\par discussed diet and exercise\par \par

## 2021-01-13 ENCOUNTER — OUTPATIENT (OUTPATIENT)
Dept: OUTPATIENT SERVICES | Facility: HOSPITAL | Age: 66
LOS: 1 days | End: 2021-01-13
Payer: COMMERCIAL

## 2021-01-13 ENCOUNTER — APPOINTMENT (OUTPATIENT)
Dept: ULTRASOUND IMAGING | Facility: CLINIC | Age: 66
End: 2021-01-13
Payer: COMMERCIAL

## 2021-01-13 DIAGNOSIS — Z98.890 OTHER SPECIFIED POSTPROCEDURAL STATES: Chronic | ICD-10-CM

## 2021-01-13 DIAGNOSIS — Z00.8 ENCOUNTER FOR OTHER GENERAL EXAMINATION: ICD-10-CM

## 2021-01-13 DIAGNOSIS — I48.91 UNSPECIFIED ATRIAL FIBRILLATION: Chronic | ICD-10-CM

## 2021-01-13 DIAGNOSIS — R79.89 OTHER SPECIFIED ABNORMAL FINDINGS OF BLOOD CHEMISTRY: ICD-10-CM

## 2021-01-13 DIAGNOSIS — Z90.5 ACQUIRED ABSENCE OF KIDNEY: Chronic | ICD-10-CM

## 2021-01-13 PROCEDURE — 76700 US EXAM ABDOM COMPLETE: CPT | Mod: 26

## 2021-01-13 PROCEDURE — 76700 US EXAM ABDOM COMPLETE: CPT

## 2021-01-14 ENCOUNTER — TRANSCRIPTION ENCOUNTER (OUTPATIENT)
Age: 66
End: 2021-01-14

## 2021-01-14 ENCOUNTER — NON-APPOINTMENT (OUTPATIENT)
Age: 66
End: 2021-01-14

## 2021-01-19 ENCOUNTER — TRANSCRIPTION ENCOUNTER (OUTPATIENT)
Age: 66
End: 2021-01-19

## 2021-01-27 ENCOUNTER — RX RENEWAL (OUTPATIENT)
Age: 66
End: 2021-01-27

## 2021-02-24 ENCOUNTER — RX RENEWAL (OUTPATIENT)
Age: 66
End: 2021-02-24

## 2021-04-12 LAB
ALBUMIN SERPL ELPH-MCNC: 3.9 G/DL
ALP BLD-CCNC: 121 U/L
ALT SERPL-CCNC: 69 U/L
ANION GAP SERPL CALC-SCNC: 11 MMOL/L
AST SERPL-CCNC: 43 U/L
BASOPHILS # BLD AUTO: 0.08 K/UL
BASOPHILS NFR BLD AUTO: 0.8 %
BILIRUB SERPL-MCNC: 0.3 MG/DL
BUN SERPL-MCNC: 24 MG/DL
CALCIUM SERPL-MCNC: 9.3 MG/DL
CHLORIDE SERPL-SCNC: 108 MMOL/L
CHOLEST SERPL-MCNC: 130 MG/DL
CO2 SERPL-SCNC: 26 MMOL/L
CREAT SERPL-MCNC: 1.86 MG/DL
EOSINOPHIL # BLD AUTO: 0.14 K/UL
EOSINOPHIL NFR BLD AUTO: 1.4 %
ESTIMATED AVERAGE GLUCOSE: 151 MG/DL
GLUCOSE SERPL-MCNC: 155 MG/DL
HBA1C MFR BLD HPLC: 6.9 %
HCT VFR BLD CALC: 47.8 %
HDLC SERPL-MCNC: 39 MG/DL
HGB BLD-MCNC: 15.3 G/DL
IMM GRANULOCYTES NFR BLD AUTO: 0.9 %
LDLC SERPL CALC-MCNC: 65 MG/DL
LDLC SERPL DIRECT ASSAY-MCNC: 78 MG/DL
LYMPHOCYTES # BLD AUTO: 1.79 K/UL
LYMPHOCYTES NFR BLD AUTO: 17.8 %
MAN DIFF?: NORMAL
MCHC RBC-ENTMCNC: 27.5 PG
MCHC RBC-ENTMCNC: 32 GM/DL
MCV RBC AUTO: 86 FL
MONOCYTES # BLD AUTO: 0.7 K/UL
MONOCYTES NFR BLD AUTO: 7 %
NEUTROPHILS # BLD AUTO: 7.24 K/UL
NEUTROPHILS NFR BLD AUTO: 72.1 %
NONHDLC SERPL-MCNC: 90 MG/DL
PLATELET # BLD AUTO: 267 K/UL
POTASSIUM SERPL-SCNC: 5.1 MMOL/L
PROT SERPL-MCNC: 7.1 G/DL
RBC # BLD: 5.56 M/UL
RBC # FLD: 14.8 %
SODIUM SERPL-SCNC: 145 MMOL/L
TRIGL SERPL-MCNC: 127 MG/DL
WBC # FLD AUTO: 10.04 K/UL

## 2021-04-13 LAB
CREAT SPEC-SCNC: 108 MG/DL
MICROALBUMIN 24H UR DL<=1MG/L-MCNC: 30 MG/DL
MICROALBUMIN/CREAT 24H UR-RTO: 278 MG/G

## 2021-04-14 ENCOUNTER — APPOINTMENT (OUTPATIENT)
Dept: ENDOCRINOLOGY | Facility: CLINIC | Age: 66
End: 2021-04-14
Payer: COMMERCIAL

## 2021-04-14 VITALS
HEART RATE: 72 BPM | HEIGHT: 70 IN | TEMPERATURE: 98.2 F | SYSTOLIC BLOOD PRESSURE: 146 MMHG | BODY MASS INDEX: 36.38 KG/M2 | DIASTOLIC BLOOD PRESSURE: 94 MMHG | RESPIRATION RATE: 14 BRPM | WEIGHT: 254.13 LBS | OXYGEN SATURATION: 98 %

## 2021-04-14 PROCEDURE — 99215 OFFICE O/P EST HI 40 MIN: CPT

## 2021-04-14 PROCEDURE — 99072 ADDL SUPL MATRL&STAF TM PHE: CPT

## 2021-04-14 NOTE — ASSESSMENT
[Carbohydrate Consistent Diet] : carbohydrate consistent diet [Exercise/Effect on Glucose] : exercise/effect on glucose [Self Monitoring of Blood Glucose] : self monitoring of blood glucose [Weight Loss] : weight loss [FreeTextEntry1] : DM2 mildly uncontrolled in patient with nephropathy, HTN , HLD , CHENEY.  \par Now s/p renal carcinoma resection B/L. \par \par DM2 : logbook reviewed bgs getting better. Bgs am 109-115 and after meals 150-180 dinner 155-200 \par he has hypoG episodes at times but he eats and not checking. advsied to check Bgs when feels hypoG sx \par continue for now glipizide XL 10 mg BID \par continue jardiance 10 mg qd \par continue tresiba 38 u HS u HS  \par increase humalog 8-12-16 u TID w meals. \par nephropathy  CKD stage Iv : continue to monitor with nephrology. GFr slightly better \par followup with Dr samano \par continue walking 3-4 x week   \par Bgs 4 x day \par has some microalbuminuria improving now on FABIANA/ c ratio \par CHENEY : LFts better. check fibroscan to evaluate levels of fibrosis \par \par HTN:   at target. continue management per cardiology\par I advised low fat/low cholesterol diet, low salt diet, and weight loss\par \par HLD:  LDL at target.\par low fat/low cholesterol diet and weight loss advised\par continue crestor 5 mg qd. \par \par obesity:  encouraged more exercise walking 30 min 3 x week\par discussed diet and exercise and low carb diet. \par advised to include proteins \par \par Goiter: palpabel nodularities B/L \par check thyroid US to evaluate architecture\par \par \par

## 2021-04-14 NOTE — PHYSICAL EXAM
[Alert] : alert [Well Nourished] : well nourished [No Acute Distress] : no acute distress [Well Developed] : well developed [Normal Sclera/Conjunctiva] : normal sclera/conjunctiva [EOMI] : extra ocular movement intact [No Proptosis] : no proptosis [Normal Oropharynx] : the oropharynx was normal [Thyroid Not Enlarged] : the thyroid was not enlarged [No Respiratory Distress] : no respiratory distress [No Accessory Muscle Use] : no accessory muscle use [Clear to Auscultation] : lungs were clear to auscultation bilaterally [Normal S1, S2] : normal S1 and S2 [Normal Rate] : heart rate was normal [Regular Rhythm] : with a regular rhythm [No Edema] : no peripheral edema [Pedal Pulses Normal] : the pedal pulses are present [Normal Bowel Sounds] : normal bowel sounds [Not Tender] : non-tender [Not Distended] : not distended [Soft] : abdomen soft [Normal Anterior Cervical Nodes] : no anterior cervical lymphadenopathy [Spine Straight] : spine straight [Normal Gait] : normal gait [No Rash] : no rash [No Tremors] : no tremors [Oriented x3] : oriented to person, place, and time [Acanthosis Nigricans] : no acanthosis nigricans [de-identified] : thryodi nodularity B/L

## 2021-06-07 NOTE — PROGRESS NOTE ADULT - PROBLEM SELECTOR PLAN 2
----- Message from Chuck Hernandez MD sent at 3/19/2020 12:01 PM CDT -----  Hi  After the virus runs its course can please schedule her for a fasting lipid panel and liver panel.  Thank you mdg      FLP and LFT ordered.  Message sent to scheduling.  -moustapha     - hold A/C   - TTE  - f/u cards

## 2021-06-23 ENCOUNTER — APPOINTMENT (OUTPATIENT)
Dept: PULMONOLOGY | Facility: CLINIC | Age: 66
End: 2021-06-23
Payer: COMMERCIAL

## 2021-06-23 VITALS
SYSTOLIC BLOOD PRESSURE: 150 MMHG | BODY MASS INDEX: 40.89 KG/M2 | WEIGHT: 285 LBS | DIASTOLIC BLOOD PRESSURE: 78 MMHG | OXYGEN SATURATION: 96 % | HEART RATE: 97 BPM

## 2021-06-23 PROCEDURE — 99213 OFFICE O/P EST LOW 20 MIN: CPT

## 2021-06-23 NOTE — HISTORY OF PRESENT ILLNESS
[FreeTextEntry1] : 63 yo male with moderate to severe vaishali maintained on nocturnal nasal auto titrating cpap at 7-17 cm H20\par Excellent compliance (>8 hrs/night). AHI on cpap=1. No xs leak\par \par Partial nephrectomy for cancer at [facility] in April\par CT in February of 2018 with stable small L effusion and stable tiny intrapulmonic LNs\par \par Cardiomyopathy managed by Dr Morales\par Doing well with cpap\par \par 6/18/20\par Loves AutoPap\par Needs medium P10 mask, heated tubing and headgear - hasn't received though ordered \par \par 12/28/20\par No new supplies since 2018\par \par 6/23/21\par Getting supplies\par Sleeping well\par Semi retired\par

## 2021-07-06 NOTE — COUNSELING
[Potential consequences of obesity discussed] : Potential consequences of obesity discussed [Benefits of weight loss discussed] : Benefits of weight loss discussed [Structured Weight Management Program suggested:] : Structured weight management program suggested [Encouraged to maintain food diary] : Encouraged to maintain food diary [Encouraged to increase physical activity] : Encouraged to increase physical activity [Weight management counseling provided] : Weight management [Healthy eating counseling provided] : healthy eating [Activity counseling provided] : activity [Decrease Portions] : Decrease food portions [None] : None [Needs reinforcement, provided] : Patient needs reinforcement on understanding lifestyle changes and  the steps needed to achieve self management goals and reinforcement was provided

## 2021-07-07 ENCOUNTER — APPOINTMENT (OUTPATIENT)
Dept: INTERNAL MEDICINE | Facility: CLINIC | Age: 66
End: 2021-07-07
Payer: COMMERCIAL

## 2021-07-07 ENCOUNTER — NON-APPOINTMENT (OUTPATIENT)
Age: 66
End: 2021-07-07

## 2021-07-07 VITALS
SYSTOLIC BLOOD PRESSURE: 120 MMHG | DIASTOLIC BLOOD PRESSURE: 80 MMHG | WEIGHT: 255 LBS | RESPIRATION RATE: 18 BRPM | HEIGHT: 70 IN | BODY MASS INDEX: 36.51 KG/M2 | TEMPERATURE: 97.3 F | HEART RATE: 80 BPM

## 2021-07-07 PROCEDURE — 93000 ELECTROCARDIOGRAM COMPLETE: CPT

## 2021-07-07 PROCEDURE — 36415 COLL VENOUS BLD VENIPUNCTURE: CPT

## 2021-07-07 PROCEDURE — 99396 PREV VISIT EST AGE 40-64: CPT | Mod: 25

## 2021-07-07 NOTE — ASSESSMENT
[FreeTextEntry1] : 65-year-old male with dilated cardiomyopathy without any evidence of cardiac or vascular decompensation but continued need for lifestyle changes with better diet and exercise and weight loss.\par \par Patient with dyspnea on exertion which may be secondary to deconditioning but concerned thereforecardiology followup scheduled for patient today.\par \par Again long discussion with patient about needed lifestyle changes and better regimen patient with his diet to prevent episodes of hypoglycemia.\par \par The patient is now status post partial left and right partial nephrectomy for renal cell cancer who is currently stable. Followup with urology as scheduled\par \par For the present time patient is to continue present medications pending results of his blood work.\par \par Colonoscopy March 2018\par Ophthalmology one to 2 times per year\par Cardiology followup as scheduled\par CT scan of the lung October 2020\par endocrinology followup\par \par Strongly recommended patient get the COVID vaccine\par \par Venipuncture done today in the office\par Followup in 3 months

## 2021-07-07 NOTE — HISTORY OF PRESENT ILLNESS
[FreeTextEntry1] : 65-year-old male presents for his yearly physical with history of dilated cardiomyopathy as well as atrial fib for which he takes Eliquis. \par Atrial fibrillation rate was increased therefore cardiology added an amiodarone.\par Patient also with history of hypertension for which he is on Cartia, hydralazine and metoprolol\par \par Type 2 diabetes has been treated with Lantus and glipizide. endocrinology added NovoLog before each meal.\par Also earlier this year endocrinology added Jardiance.\par \par The patient's weight is about the same stating that he follows a low carb diet but does not exercise.\par \par Also history of obstructive sleep apnea for which the patient uses CPAP. Definite benefit\par \par Also history of hepatitis C which he is being followed by gastro-.\par \par Patient is generally feeling well without complaints including no chest pain, palpitations, shortness of breath or edema.\par The patient states the past 6 months she's noted dyspnea on exertion which has not worsened but continues. He states he can't do things like he used to. No chest pain or palpitations. He has not followed up with cardiology.\par \par The patient's other significant history is that he was diagnosed with a left renal cancer and had a partial left nephrectomy early April 2018.\par Postoperatively he has been somewhat winsome course with patient had postoperative gross hematuria for which he needed to be readmitted with an embolization.\par Since he's had no further bleeding.\par .\par Interestingly the patient also had a right renal tumor for which he had a laparoscopic right partial nephrectomy April 2019 with it also being cancer.\par Postoperatively no complications\par The patient followed up with  November 2020 with renal ultrasound good\par \par Patient notes that his mother has been diagnosed with colon cancer and passed away \par Also 2 brothers with colon polyps.\par The patient had his colonoscopy March 2018\par

## 2021-07-07 NOTE — DATA REVIEWED
[FreeTextEntry1] : EKG-controlled atrial fibrillation with LAHB, PRWP - no change\par \par Echo July 2019 with EF at 40-45% with mild MR/TR, mild-moderate TR\par Holter monitor with atrial fibrillation

## 2021-07-07 NOTE — HEALTH RISK ASSESSMENT
[Good] : ~his/her~ current health as good [Yes] : Yes [2 - 4 times a month (2 pts)] : 2-4 times a month (2 points) [1 or 2 (0 pts)] : 1 or 2 (0 points) [Never (0 pts)] : Never (0 points) [No falls in past year] : Patient reported no falls in the past year [0] : 2) Feeling down, depressed, or hopeless: Not at all (0) [2] : 2 [None] : None [With Significant Other] : lives with significant other [# of Members in Household ___] :  household currently consist of [unfilled] member(s) [Employed] : employed [# Of Children ___] : has [unfilled] children [Sexually Active] : sexually active [Feels Safe at Home] : Feels safe at home [Fully functional (bathing, dressing, toileting, transferring, walking, feeding)] : Fully functional (bathing, dressing, toileting, transferring, walking, feeding) [Fully functional (using the telephone, shopping, preparing meals, housekeeping, doing laundry, using] : Fully functional and needs no help or supervision to perform IADLs (using the telephone, shopping, preparing meals, housekeeping, doing laundry, using transportation, managing medications and managing finances) [Smoke Detector] : smoke detector [Carbon Monoxide Detector] : carbon monoxide detector [Seat Belt] :  uses seat belt [Sunscreen] : uses sunscreen [Discussed at today's visit] : Advance Directives Discussed at today's visit [Designated Healthcare Proxy] : Designated healthcare proxy [Name: ___] : Health Care Proxy's Name: [unfilled]  [Excellent] : ~his/her~  mood as  excellent [] : No [Audit-CScore] : 2 [de-identified] : active [de-identified] : good [KNN8Sbovj] : 0 [LowDoseCTScan] : 10/20 [Change in mental status noted] : No change in mental status noted [Reports changes in hearing] : Reports no changes in hearing [Reports changes in vision] : Reports no changes in vision [Reports changes in dental health] : Reports no changes in dental health [HIVDate] : 07/14 [HIVComments] : negative [HepatitisCDate] : 02/00 [HepatitisCComments] : Patient with  hepatitis C [FreeTextEntry2] : Alisha [de-identified] : tinnitus [AdvancecareDate] : 07/21

## 2021-07-07 NOTE — PHYSICAL EXAM
[General Appearance - Alert] : alert [General Appearance - In No Acute Distress] : in no acute distress [General Appearance - Well Nourished] : well nourished [Sclera] : the sclera and conjunctiva were normal [PERRL With Normal Accommodation] : pupils were equal in size, round, and reactive to light [Extraocular Movements] : extraocular movements were intact [Outer Ear] : the ears and nose were normal in appearance [Oropharynx] : the oropharynx was normal [Neck Appearance] : the appearance of the neck was normal [Neck Cervical Mass (___cm)] : no neck mass was observed [Jugular Venous Distention Increased] : there was no jugular-venous distention [Thyroid Diffuse Enlargement] : the thyroid was not enlarged [Thyroid Nodule] : there were no palpable thyroid nodules [Auscultation Breath Sounds / Voice Sounds] : lungs were clear to auscultation bilaterally [Heart Sounds] : normal S1 and S2 [Heart Sounds Gallop] : no gallops [Murmurs] : no murmurs [Heart Sounds Pericardial Friction Rub] : no pericardial rub [Arterial Pulses Carotid] : carotid pulses were normal with no bruits [Abdominal Aorta] : the abdominal aorta was normal [Arterial Pulses Femoral] : femoral pulses were normal without bruits [Full Pulse] : the pedal pulses are present [Edema] : there was no peripheral edema [Veins - Varicosity Changes] : there were no varicosital changes [Bowel Sounds] : normal bowel sounds [Abdomen Soft] : soft [Abdomen Tenderness] : non-tender [Abdomen Mass (___ Cm)] : no abdominal mass palpated [Patient Refused] : rectal exam was refused by the patient [Cervical Lymph Nodes Enlarged Posterior Bilaterally] : posterior cervical [Cervical Lymph Nodes Enlarged Anterior Bilaterally] : anterior cervical [Supraclavicular Lymph Nodes Enlarged Bilaterally] : supraclavicular [Axillary Lymph Nodes Enlarged Bilaterally] : axillary [Femoral Lymph Nodes Enlarged Bilaterally] : femoral [Inguinal Lymph Nodes Enlarged Bilaterally] : inguinal [No Spinal Tenderness] : no spinal tenderness [Abnormal Walk] : normal gait [Nail Clubbing] : no clubbing  or cyanosis of the fingernails [Musculoskeletal - Swelling] : no joint swelling seen [Motor Tone] : muscle strength and tone were normal [Skin Color & Pigmentation] : normal skin color and pigmentation [Skin Turgor] : normal skin turgor [] : no rash [Right Foot Was Examined] : right foot was examined [Left Foot Was Examined] : left foot was examined [Normal Appearance] : normal in appearance [Normal in Appearance] : normal in appearance [Normal] : normal [2+] : 2+ in the dorsalis pedis [Cranial Nerves] : cranial nerves 2-12 were intact [No Focal Deficits] : no focal deficits [Oriented To Time, Place, And Person] : oriented to person, place, and time [Impaired Insight] : insight and judgment were intact [Affect] : the affect was normal [#1 Diminished] : number 1 was diminished [#2 Diminished] : number 2 was normal [#3 Diminished] : number 3 was normal [#4 Diminished] : number 4 was normal [#5 Diminished] : number 5 was normal [#6 Diminished] : number 6 was normal [#7 Diminished] : number 7 was normal [#8 Diminished] : number 8 was normal [#9 Diminished] : number 9 was normal [#10 Diminished] : number 10 was diminished [FreeTextEntry1] : Bilateral chronic stasis changes, Abrasions bilateral mid-tibial areas with the right worse in the last with denuded skin and no ulcer and no sign of infection

## 2021-07-08 LAB
ALBUMIN SERPL ELPH-MCNC: 4 G/DL
ALP BLD-CCNC: 112 U/L
ALT SERPL-CCNC: 37 U/L
ANION GAP SERPL CALC-SCNC: 14 MMOL/L
APPEARANCE: CLEAR
AST SERPL-CCNC: 29 U/L
BACTERIA: NEGATIVE
BASOPHILS # BLD AUTO: 0.05 K/UL
BASOPHILS NFR BLD AUTO: 0.5 %
BILIRUB SERPL-MCNC: 0.3 MG/DL
BILIRUBIN URINE: NEGATIVE
BLOOD URINE: NEGATIVE
BUN SERPL-MCNC: 26 MG/DL
CALCIUM SERPL-MCNC: 9.5 MG/DL
CHLORIDE SERPL-SCNC: 106 MMOL/L
CHOLEST SERPL-MCNC: 139 MG/DL
CO2 SERPL-SCNC: 21 MMOL/L
COLOR: YELLOW
CREAT SERPL-MCNC: 1.93 MG/DL
CREAT SPEC-SCNC: 81 MG/DL
EOSINOPHIL # BLD AUTO: 0.2 K/UL
EOSINOPHIL NFR BLD AUTO: 1.8 %
ESTIMATED AVERAGE GLUCOSE: 137 MG/DL
GLUCOSE QUALITATIVE U: ABNORMAL
GLUCOSE SERPL-MCNC: 167 MG/DL
HBA1C MFR BLD HPLC: 6.4 %
HCT VFR BLD CALC: 49.1 %
HDLC SERPL-MCNC: 39 MG/DL
HGB BLD-MCNC: 15.4 G/DL
HYALINE CASTS: 0 /LPF
IMM GRANULOCYTES NFR BLD AUTO: 0.6 %
KETONES URINE: NEGATIVE
LDLC SERPL CALC-MCNC: 73 MG/DL
LEUKOCYTE ESTERASE URINE: NEGATIVE
LYMPHOCYTES # BLD AUTO: 1.73 K/UL
LYMPHOCYTES NFR BLD AUTO: 15.9 %
MAN DIFF?: NORMAL
MCHC RBC-ENTMCNC: 27.6 PG
MCHC RBC-ENTMCNC: 31.4 GM/DL
MCV RBC AUTO: 88.2 FL
MICROALBUMIN 24H UR DL<=1MG/L-MCNC: 45.3 MG/DL
MICROALBUMIN/CREAT 24H UR-RTO: 560 MG/G
MICROSCOPIC-UA: NORMAL
MONOCYTES # BLD AUTO: 0.97 K/UL
MONOCYTES NFR BLD AUTO: 8.9 %
NEUTROPHILS # BLD AUTO: 7.89 K/UL
NEUTROPHILS NFR BLD AUTO: 72.3 %
NITRITE URINE: NEGATIVE
NONHDLC SERPL-MCNC: 100 MG/DL
PH URINE: 5.5
PLATELET # BLD AUTO: 251 K/UL
POTASSIUM SERPL-SCNC: 4.9 MMOL/L
PROT SERPL-MCNC: 7.2 G/DL
PROTEIN URINE: ABNORMAL
PSA SERPL-MCNC: 2.52 NG/ML
RBC # BLD: 5.57 M/UL
RBC # FLD: 14.8 %
RED BLOOD CELLS URINE: 4 /HPF
SODIUM SERPL-SCNC: 141 MMOL/L
SPECIFIC GRAVITY URINE: 1.02
SQUAMOUS EPITHELIAL CELLS: 0 /HPF
TRIGL SERPL-MCNC: 137 MG/DL
TSH SERPL-ACNC: 3.03 UIU/ML
UROBILINOGEN URINE: NORMAL
WBC # FLD AUTO: 10.91 K/UL
WHITE BLOOD CELLS URINE: 1 /HPF

## 2021-07-09 ENCOUNTER — NON-APPOINTMENT (OUTPATIENT)
Age: 66
End: 2021-07-09

## 2021-07-14 ENCOUNTER — APPOINTMENT (OUTPATIENT)
Dept: ENDOCRINOLOGY | Facility: CLINIC | Age: 66
End: 2021-07-14
Payer: COMMERCIAL

## 2021-07-14 VITALS
HEART RATE: 66 BPM | BODY MASS INDEX: 36.85 KG/M2 | TEMPERATURE: 97.8 F | WEIGHT: 257.38 LBS | DIASTOLIC BLOOD PRESSURE: 80 MMHG | HEIGHT: 70 IN | RESPIRATION RATE: 16 BRPM | OXYGEN SATURATION: 98 % | SYSTOLIC BLOOD PRESSURE: 130 MMHG

## 2021-07-14 PROCEDURE — 99214 OFFICE O/P EST MOD 30 MIN: CPT

## 2021-07-14 NOTE — PHYSICAL EXAM
[Alert] : alert [Well Nourished] : well nourished [No Acute Distress] : no acute distress [Well Developed] : well developed [Normal Sclera/Conjunctiva] : normal sclera/conjunctiva [EOMI] : extra ocular movement intact [No Proptosis] : no proptosis [Normal Oropharynx] : the oropharynx was normal [Thyroid Not Enlarged] : the thyroid was not enlarged [No Respiratory Distress] : no respiratory distress [No Accessory Muscle Use] : no accessory muscle use [Clear to Auscultation] : lungs were clear to auscultation bilaterally [Normal S1, S2] : normal S1 and S2 [Normal Rate] : heart rate was normal [Regular Rhythm] : with a regular rhythm [No Edema] : no peripheral edema [Pedal Pulses Normal] : the pedal pulses are present [Normal Bowel Sounds] : normal bowel sounds [Not Tender] : non-tender [Not Distended] : not distended [Soft] : abdomen soft [Normal Anterior Cervical Nodes] : no anterior cervical lymphadenopathy [Spine Straight] : spine straight [Normal Gait] : normal gait [No Rash] : no rash [No Tremors] : no tremors [Oriented x3] : oriented to person, place, and time [Acanthosis Nigricans] : no acanthosis nigricans [de-identified] : thryodi nodularity B/L

## 2021-07-14 NOTE — ASSESSMENT
[FreeTextEntry1] : DM2 mildly uncontrolled in patient with nephropathy, HTN , HLD , CHENEY.  \par Now s/p renal carcinoma resection B/L. \par \par DM2 : logbook reviewed bgs getting better. bgs rarely higher than 130. No hypoG  a1c 6.4. \par continue for now glipizide XL 10 mg BID \par continue jardiance 10 mg qd \par continue tresiba 38 u HS u HS  \par continue humalog 8-12-16 u TID w meals. \par nephropathy  CKD stage Iv : continue to monitor with nephrology. \par start walking 30 min 3x week.    \par Bgs 4 x day \par krishan 44. cont to monitor. \par CHENEY : LFts normal now. \par has SOB and he will have stress test soon w cardiology.  \par reccomm covid vaccine \par \par HTN:   at target. continue management per cardiology\par I advised low fat/low cholesterol diet, low salt diet, and weight loss\par \par HLD:  LDL at target.\par low fat/low cholesterol diet and weight loss advised\par continue crestor 5 mg qd. \par \par obesity:  encouraged more exercise walking 30 min 3 x week\par discussed diet and exercise and low carb diet. \par \par Goiter: palpable nodularities B/L \par check thyroid US to evaluate architecture\par will call pt w results \par \par \par

## 2021-07-15 ENCOUNTER — APPOINTMENT (OUTPATIENT)
Dept: ULTRASOUND IMAGING | Facility: CLINIC | Age: 66
End: 2021-07-15
Payer: COMMERCIAL

## 2021-07-15 ENCOUNTER — OUTPATIENT (OUTPATIENT)
Dept: OUTPATIENT SERVICES | Facility: HOSPITAL | Age: 66
LOS: 1 days | End: 2021-07-15
Payer: COMMERCIAL

## 2021-07-15 DIAGNOSIS — I48.91 UNSPECIFIED ATRIAL FIBRILLATION: Chronic | ICD-10-CM

## 2021-07-15 DIAGNOSIS — Z98.890 OTHER SPECIFIED POSTPROCEDURAL STATES: Chronic | ICD-10-CM

## 2021-07-15 DIAGNOSIS — E04.9 NONTOXIC GOITER, UNSPECIFIED: ICD-10-CM

## 2021-07-15 DIAGNOSIS — Z90.5 ACQUIRED ABSENCE OF KIDNEY: Chronic | ICD-10-CM

## 2021-07-15 PROCEDURE — 76536 US EXAM OF HEAD AND NECK: CPT

## 2021-07-15 PROCEDURE — 76536 US EXAM OF HEAD AND NECK: CPT | Mod: 26

## 2021-07-26 ENCOUNTER — LABORATORY RESULT (OUTPATIENT)
Age: 66
End: 2021-07-26

## 2021-07-27 ENCOUNTER — NON-APPOINTMENT (OUTPATIENT)
Age: 66
End: 2021-07-27

## 2021-07-27 LAB
BSA DERIVED: 1.73 M2
CREAT 24H UR-MCNC: 1.7 G/24 H
CREAT 24H UR-MCNC: 1.7 G/24 H
CREAT ?TM UR-MCNC: 59 MG/DL
CREAT ?TM UR-MCNC: 59 MG/DL
CREAT CL 24H UR+SERPL-VRATE: 59 ML/MIN
PROT 24H UR-MRATE: 28 MG/DL
PROT ?TM UR-MCNC: 24 HR
PROT ?TM UR-MCNC: 24 HR
PROT UR-MCNC: 812 MG/24 H
SPECIMEN VOL 24H UR: 2900 ML
SPECIMEN VOL 24H UR: 2900 ML

## 2021-07-28 ENCOUNTER — TRANSCRIPTION ENCOUNTER (OUTPATIENT)
Age: 66
End: 2021-07-28

## 2021-07-28 ENCOUNTER — NON-APPOINTMENT (OUTPATIENT)
Age: 66
End: 2021-07-28

## 2021-08-06 ENCOUNTER — APPOINTMENT (OUTPATIENT)
Dept: ULTRASOUND IMAGING | Facility: CLINIC | Age: 66
End: 2021-08-06
Payer: COMMERCIAL

## 2021-08-06 ENCOUNTER — RESULT REVIEW (OUTPATIENT)
Age: 66
End: 2021-08-06

## 2021-08-06 ENCOUNTER — OUTPATIENT (OUTPATIENT)
Dept: OUTPATIENT SERVICES | Facility: HOSPITAL | Age: 66
LOS: 1 days | End: 2021-08-06
Payer: COMMERCIAL

## 2021-08-06 DIAGNOSIS — I48.91 UNSPECIFIED ATRIAL FIBRILLATION: Chronic | ICD-10-CM

## 2021-08-06 DIAGNOSIS — Z98.890 OTHER SPECIFIED POSTPROCEDURAL STATES: Chronic | ICD-10-CM

## 2021-08-06 DIAGNOSIS — E04.9 NONTOXIC GOITER, UNSPECIFIED: ICD-10-CM

## 2021-08-06 DIAGNOSIS — Z90.5 ACQUIRED ABSENCE OF KIDNEY: Chronic | ICD-10-CM

## 2021-08-06 PROCEDURE — 88173 CYTOPATH EVAL FNA REPORT: CPT | Mod: 26

## 2021-08-06 PROCEDURE — 10006 FNA BX W/US GDN EA ADDL: CPT

## 2021-08-06 PROCEDURE — 88173 CYTOPATH EVAL FNA REPORT: CPT

## 2021-08-06 PROCEDURE — 10005 FNA BX W/US GDN 1ST LES: CPT

## 2021-08-07 ENCOUNTER — NON-APPOINTMENT (OUTPATIENT)
Age: 66
End: 2021-08-07

## 2021-08-09 LAB — NON-GYNECOLOGICAL CYTOLOGY STUDY: SIGNIFICANT CHANGE UP

## 2021-08-10 ENCOUNTER — NON-APPOINTMENT (OUTPATIENT)
Age: 66
End: 2021-08-10

## 2021-08-31 ENCOUNTER — APPOINTMENT (OUTPATIENT)
Dept: INTERNAL MEDICINE | Facility: CLINIC | Age: 66
End: 2021-08-31

## 2021-09-08 ENCOUNTER — TRANSCRIPTION ENCOUNTER (OUTPATIENT)
Age: 66
End: 2021-09-08

## 2021-09-08 ENCOUNTER — APPOINTMENT (OUTPATIENT)
Dept: INTERNAL MEDICINE | Facility: CLINIC | Age: 66
End: 2021-09-08
Payer: MEDICARE

## 2021-09-08 PROCEDURE — 99441: CPT | Mod: CS,95

## 2021-09-08 NOTE — HISTORY OF PRESENT ILLNESS
[Home] : at home, [unfilled] , at the time of the visit. [Medical Office: ([facility])___] : at the medical office located in  [Verbal consent obtained from patient] : the patient, [unfilled] [FreeTextEntry1] : Pt tested + for covid 9/5/21 with home rapid test\par Patient's symptoms include Cough that he feels deep in his chest was tight lungs but no significant dyspnea. Low-grade fever around 100-101. Patient feels slightly worse. Patient has associated fatigue\par Patient symptoms started on 9/1/21\par Patient did NOT receive vaccine\par \par Covid d/w pt, questions answered\par \par Plan\par -Rest/fluids/supportive therapy\par -Quarantine\par -Discussed infusion therapy, patient is candidate=referral done/agrees\par -Discussed [facility] referral\par -Report any negative symptoms\par \par Diagnosis code =U07.1/Z71.89\par Time on the phone =10minutes\par

## 2021-09-09 ENCOUNTER — APPOINTMENT (OUTPATIENT)
Dept: DISASTER EMERGENCY | Facility: HOSPITAL | Age: 66
End: 2021-09-09

## 2021-09-09 ENCOUNTER — APPOINTMENT (OUTPATIENT)
Dept: PULMONOLOGY | Facility: CLINIC | Age: 66
End: 2021-09-09

## 2021-09-10 ENCOUNTER — OUTPATIENT (OUTPATIENT)
Dept: OUTPATIENT SERVICES | Facility: HOSPITAL | Age: 66
LOS: 1 days | End: 2021-09-10
Payer: MEDICARE

## 2021-09-10 ENCOUNTER — APPOINTMENT (OUTPATIENT)
Dept: DISASTER EMERGENCY | Facility: HOSPITAL | Age: 66
End: 2021-09-10

## 2021-09-10 ENCOUNTER — NON-APPOINTMENT (OUTPATIENT)
Age: 66
End: 2021-09-10

## 2021-09-10 VITALS
TEMPERATURE: 100 F | OXYGEN SATURATION: 94 % | DIASTOLIC BLOOD PRESSURE: 73 MMHG | WEIGHT: 237 LBS | HEART RATE: 85 BPM | SYSTOLIC BLOOD PRESSURE: 134 MMHG | HEIGHT: 70 IN | RESPIRATION RATE: 17 BRPM

## 2021-09-10 VITALS
DIASTOLIC BLOOD PRESSURE: 75 MMHG | SYSTOLIC BLOOD PRESSURE: 113 MMHG | HEART RATE: 77 BPM | RESPIRATION RATE: 18 BRPM | TEMPERATURE: 100 F | OXYGEN SATURATION: 93 %

## 2021-09-10 DIAGNOSIS — Z98.890 OTHER SPECIFIED POSTPROCEDURAL STATES: Chronic | ICD-10-CM

## 2021-09-10 DIAGNOSIS — Z90.5 ACQUIRED ABSENCE OF KIDNEY: Chronic | ICD-10-CM

## 2021-09-10 DIAGNOSIS — U07.1 COVID-19: ICD-10-CM

## 2021-09-10 DIAGNOSIS — I48.91 UNSPECIFIED ATRIAL FIBRILLATION: Chronic | ICD-10-CM

## 2021-09-10 PROCEDURE — M0243: CPT

## 2021-09-10 RX ORDER — SODIUM CHLORIDE 9 MG/ML
250 INJECTION INTRAMUSCULAR; INTRAVENOUS; SUBCUTANEOUS
Refills: 0 | Status: COMPLETED | OUTPATIENT
Start: 2021-09-10 | End: 2021-09-10

## 2021-09-10 RX ADMIN — SODIUM CHLORIDE 310 MILLILITER(S): 9 INJECTION INTRAMUSCULAR; INTRAVENOUS; SUBCUTANEOUS at 10:30

## 2021-09-10 NOTE — CHART NOTE - NSCHARTNOTEFT_GEN_A_CORE
I have reviewed the  Casirivimab/Imdevimab Emergency Use Authorization (EAU) and I have provided the patient or patient's caregiver with the following information:  1. FDA has authorized emergency use of Casirivimab/Imdevimab , which is not FDA-approved biologic product.  2. The patient or patient's caregiver has the option to accept or refuse administration of Casirivimab  3. The significant risks and benefits are unknown.  4. Information on available alternative treatments and risks and benefits of those alternatives.    Discharge:  T(C): 37.7 (09-10-21 @ 12:25), Max: 38.4 (09-10-21 @ 10:57)  HR: 77 (09-10-21 @ 12:25) (72 - 85)  BP: 113/75 (09-10-21 @ 12:25) (100/69 - 134/73)  RR: 18 (09-10-21 @ 12:25) (17 - 18)  SpO2: 93% (09-10-21 @ 12:25) (93% - 95%)  Patient tolerated infusion well denies complaints of chest pain/SOB/dizzines/ palps  VSS for discharge home  D/C instructions given/ fact sheet included.  Patient to follow-up with PCP as needed
CC: Monoclonal Antibody Infusion/COVID 19 Positive      History: Patient presents for infusion of monoclonal antibody infusion. Patient has been screened and was deemed to be a candidate.    Exposure: Son / daughter in lw COVID +    Symptoms/ Criteria: fever, cough, SOB, body aches, chills    Inclusion Criteria: BMI > 35  HTN  DM CHF    Date of Positive Test: verified by clinical call center     Date of symptom onset:     Vaccine status: unvaccinated    PMHx:  Infection due to severe acute respiratory syndrome coronavirus 2 (SARS-CoV-2)    H/o or current diagnosis of HF- ACEI/ARB contraindication unknown    H/o or current diagnosis of HF- Contraindication to ACEI/ARBs    H/o or current diagnosis of HF- ACEI/ARB contraindication unknown    H/o or current diagnosis of HF- Contraindication to ACEI/ARBs    H/o or current diagnosis of HF- ACEI/ARB contraindication unknown    H/o or current diagnosis of HF- Contraindication to ACEI/ARBs    Diabetes mellitus (Sibling)    Family history of colon cancer    Hypertension    Hyperlipidemia    Diabetes mellitus    Other specified disorders of kidney and ureter    MARIANA on CPAP    Cardiomyopathy    Atrial fibrillation    Hypercholesterolemia    On Coumadin for atrial fibrillation    Hepatitis C    Hard of hearing    History of tonsillectomy    H/O colonoscopy with polypectomy    H/O partial nephrectomy    H/O partial nephrectomy    S/P ablation of atrial fibrillation    Atrial fibrillation          T(C): 37.8 (09-10-21 @ 11:10), Max: 38.4 (09-10-21 @ 10:57)  HR: 74 (09-10-21 @ 11:10) (74 - 85)  BP: 105/71 (09-10-21 @ 11:10) (105/71 - 134/73)  RR: 17 (09-10-21 @ 11:10) (17 - 17)  SpO2: 93% (09-10-21 @ 11:10) (93% - 95%)      PE:   Appearance: NAD	  HEENT:   Normal oral mucosa.   Lymphatic: No lymphadenopathy  Cardiovascular: Normal S1 S2, No JVD, No murmurs, No edema  Respiratory: Lungs clear to auscultation	  Gastrointestinal:  Soft, Non-tender. No guarding   Skin: warm and dry  Neurologic: Non-focal  Extremities: Normal range of motion.     ASSESSMENT:  Pt is a 66y year old Male with PMH  DM HTN BMI > 35 Covid +  referred to the infusion center for Monoclonal antibody infusion (Regeneron).        PLAN:  - infusion procedure explained to patient   - Consent for monoclonal antibody infusion obtained   - Risk & benefits discussed/all questions answered  - infuse Regeneron per protocol  - will observe patient for one hour post infusion  and then if stable discharge home with outpt follow up as planned by PMD.        - Patient tolerated infusion well denies complaints of chest pain/SOB/dizziness/ palpitations  - VSS for discharge home  - D/C instructions given/ fact sheet included.  - Patient to follow-up with PCP as needed.

## 2021-09-10 NOTE — CHART NOTE - SYMPTOMS
Fever/Cough/Sore Throa/Malaise/Headache/Muscle Pain not due a chronic condition/Shortness of breath with exertion/Loss of taste / smell

## 2021-09-12 ENCOUNTER — TRANSCRIPTION ENCOUNTER (OUTPATIENT)
Age: 66
End: 2021-09-12

## 2021-09-13 ENCOUNTER — APPOINTMENT (OUTPATIENT)
Dept: INTERNAL MEDICINE | Facility: CLINIC | Age: 66
End: 2021-09-13

## 2021-09-13 ENCOUNTER — APPOINTMENT (OUTPATIENT)
Dept: INTERNAL MEDICINE | Facility: CLINIC | Age: 66
End: 2021-09-13
Payer: MEDICARE

## 2021-09-13 ENCOUNTER — NON-APPOINTMENT (OUTPATIENT)
Age: 66
End: 2021-09-13

## 2021-09-13 PROCEDURE — 99441: CPT | Mod: CS,95

## 2021-09-13 NOTE — HISTORY OF PRESENT ILLNESS
[Home] : at home, [unfilled] , at the time of the visit. [Medical Office: ([facility])___] : at the medical office located in  [Verbal consent obtained from patient] : the patient, [unfilled] [FreeTextEntry1] : See last note as pt was covid + 9/5/21\par Pt got infusion tx 9/10/21\par Pt reports he is MUCH better, fever resolved. Patient still has cough that he feels in his chest and he reports sputum has slightly blood-tinge, not pure blood. Patient states breathing is fine. Patient is on Eliquis\par \par \par Dx code=U07.1/Z71.89/R04.2\par time on phone=7minutes\par \par Plan\par -Rest/fluids/quarantine\par -Supportive therapy\par -I recommend [facility] referral to include chest x-ray/evaluation which patient will contemplate but currently declined\par -told to report any negative sx\par

## 2021-09-14 ENCOUNTER — TRANSCRIPTION ENCOUNTER (OUTPATIENT)
Age: 66
End: 2021-09-14

## 2021-09-15 ENCOUNTER — NON-APPOINTMENT (OUTPATIENT)
Age: 66
End: 2021-09-15

## 2021-10-01 ENCOUNTER — APPOINTMENT (OUTPATIENT)
Dept: NEPHROLOGY | Facility: CLINIC | Age: 66
End: 2021-10-01
Payer: MEDICARE

## 2021-10-01 VITALS
OXYGEN SATURATION: 98 % | SYSTOLIC BLOOD PRESSURE: 138 MMHG | HEIGHT: 70 IN | HEART RATE: 68 BPM | BODY MASS INDEX: 34.93 KG/M2 | DIASTOLIC BLOOD PRESSURE: 86 MMHG | WEIGHT: 244 LBS

## 2021-10-01 DIAGNOSIS — Z79.4 LONG TERM (CURRENT) USE OF INSULIN: ICD-10-CM

## 2021-10-01 PROCEDURE — 36415 COLL VENOUS BLD VENIPUNCTURE: CPT

## 2021-10-01 PROCEDURE — 99214 OFFICE O/P EST MOD 30 MIN: CPT | Mod: 25

## 2021-10-01 RX ORDER — PEN NEEDLE, DIABETIC 32GX 5/32"
32G X 4 MM NEEDLE, DISPOSABLE MISCELLANEOUS
Qty: 4 | Refills: 1 | Status: DISCONTINUED | COMMUNITY
Start: 2020-11-23 | End: 2021-10-01

## 2021-10-01 RX ORDER — BLOOD SUGAR DIAGNOSTIC
STRIP MISCELLANEOUS
Qty: 4 | Refills: 1 | Status: DISCONTINUED | COMMUNITY
Start: 2020-07-14 | End: 2021-10-01

## 2021-10-01 RX ORDER — DOCUSATE SODIUM 100 MG/1
100 CAPSULE ORAL
Refills: 0 | Status: ACTIVE | COMMUNITY

## 2021-10-01 NOTE — ASSESSMENT
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 IV C ) \par \par Control DM, HTN ,\par \par eGFR Stable > 1 Year, \par \par Rec : Small Freq. Meals, \par \par Exercise 45 min., Daily,

## 2021-10-01 NOTE — PHYSICAL EXAM
[General Appearance - Alert] : alert [General Appearance - In No Acute Distress] : in no acute distress [General Appearance - Well Nourished] : well nourished [General Appearance - Well Developed] : well developed [General Appearance - Well-Appearing] : healthy appearing [Sclera] : the sclera and conjunctiva were normal [PERRL With Normal Accommodation] : pupils were equal in size, round, and reactive to light [Extraocular Movements] : extraocular movements were intact [Strabismus] : no strabismus was seen [Outer Ear] : the ears and nose were normal in appearance [Hearing Threshold Finger Rub Not [place]] : hearing was normal [Examination Of The Oral Cavity] : the lips and gums were normal [Both Tympanic Membranes Were Examined] : both tympanic membranes were normal [Nasal Cavity] : the nasal mucosa and septum were normal [Oropharynx] : the oropharynx was normal [Neck Appearance] : the appearance of the neck was normal [Neck Cervical Mass (___cm)] : no neck mass was observed [Jugular Venous Distention Increased] : there was no jugular-venous distention [Thyroid Diffuse Enlargement] : the thyroid was not enlarged [Thyroid Nodule] : there were no palpable thyroid nodules [Exaggerated Use Of Accessory Muscles For Inspiration] : no accessory muscle use [Auscultation Breath Sounds / Voice Sounds] : lungs were clear to auscultation bilaterally [Chest Palpation] : palpation of the chest revealed no abnormalities [Lungs Percussion] : the lungs were normal to percussion [Heart Rate And Rhythm] : heart rate was normal and rhythm regular [Heart Sounds] : normal S1 and S2 [Heart Sounds Gallop] : no gallops [Murmurs] : no murmurs [Heart Sounds Pericardial Friction Rub] : no pericardial rub [Full Pulse] : the pedal pulses are present [Edema] : there was no peripheral edema [Bowel Sounds] : normal bowel sounds [Abdomen Soft] : soft [Abdomen Tenderness] : non-tender [Abdomen Mass (___ Cm)] : no abdominal mass palpated [Cervical Lymph Nodes Enlarged Posterior Bilaterally] : posterior cervical [Cervical Lymph Nodes Enlarged Anterior Bilaterally] : anterior cervical [Supraclavicular Lymph Nodes Enlarged Bilaterally] : supraclavicular [Axillary Lymph Nodes Enlarged Bilaterally] : axillary [Femoral Lymph Nodes Enlarged Bilaterally] : femoral [Inguinal Lymph Nodes Enlarged Bilaterally] : inguinal [No CVA Tenderness] : no ~M costovertebral angle tenderness [No Spinal Tenderness] : no spinal tenderness [Abnormal Walk] : normal gait [Nail Clubbing] : no clubbing  or cyanosis of the fingernails [Musculoskeletal - Swelling] : no joint swelling seen [Motor Tone] : muscle strength and tone were normal [Skin Color & Pigmentation] : normal skin color and pigmentation [Skin Turgor] : normal skin turgor [] : no rash [Deep Tendon Reflexes (DTR)] : deep tendon reflexes were 2+ and symmetric [Sensation] : the sensory exam was normal to light touch and pinprick [No Focal Deficits] : no focal deficits [Oriented To Time, Place, And Person] : oriented to person, place, and time [Impaired Insight] : insight and judgment were intact [Affect] : the affect was normal [FreeTextEntry1] : AF,

## 2021-10-01 NOTE — HISTORY OF PRESENT ILLNESS
[FreeTextEntry1] : REF : Proteinuria,\par \par DM, X 10 years, S/P Partial L - Nephrectomy ( RCC )  F/U US - Reviewed, \par \par Non Ischemic Cardiomyopathy, HTN, \par \par Holter in Place.  PAF, On AC, \par \par HTN Management - Per Cardiology , Rate Control,

## 2021-10-04 DIAGNOSIS — E83.42 HYPOMAGNESEMIA: ICD-10-CM

## 2021-10-04 DIAGNOSIS — Z85.528 PERSONAL HISTORY OF OTHER MALIGNANT NEOPLASM OF KIDNEY: ICD-10-CM

## 2021-10-04 DIAGNOSIS — R79.89 OTHER SPECIFIED ABNORMAL FINDINGS OF BLOOD CHEMISTRY: ICD-10-CM

## 2021-10-04 DIAGNOSIS — R04.2 HEMOPTYSIS: ICD-10-CM

## 2021-10-04 DIAGNOSIS — Z86.39 PERSONAL HISTORY OF OTHER ENDOCRINE, NUTRITIONAL AND METABOLIC DISEASE: ICD-10-CM

## 2021-10-04 DIAGNOSIS — Z86.69 PERSONAL HISTORY OF OTHER DISEASES OF THE NERVOUS SYSTEM AND SENSE ORGANS: ICD-10-CM

## 2021-10-04 DIAGNOSIS — Z12.5 ENCOUNTER FOR SCREENING FOR MALIGNANT NEOPLASM OF PROSTATE: ICD-10-CM

## 2021-10-04 DIAGNOSIS — Z71.89 OTHER SPECIFIED COUNSELING: ICD-10-CM

## 2021-10-04 RX ORDER — HYDRALAZINE HYDROCHLORIDE 10 MG/1
10 TABLET ORAL
Qty: 180 | Refills: 5 | Status: DISCONTINUED | COMMUNITY
Start: 2019-09-30 | End: 2021-10-04

## 2021-10-05 DIAGNOSIS — N18.32 CHRONIC KIDNEY DISEASE, STAGE 3B: ICD-10-CM

## 2021-10-05 LAB
25(OH)D3 SERPL-MCNC: 38.7 NG/ML
ALBUMIN SERPL ELPH-MCNC: 3.8 G/DL
ANION GAP SERPL CALC-SCNC: 11 MMOL/L
APPEARANCE: CLEAR
BACTERIA: NEGATIVE
BASOPHILS # BLD AUTO: 0.06 K/UL
BASOPHILS NFR BLD AUTO: 0.7 %
BILIRUBIN URINE: NEGATIVE
BLOOD URINE: NEGATIVE
BUN SERPL-MCNC: 17 MG/DL
CALCIUM SERPL-MCNC: 9 MG/DL
CALCIUM SERPL-MCNC: 9 MG/DL
CHLORIDE SERPL-SCNC: 106 MMOL/L
CO2 SERPL-SCNC: 26 MMOL/L
COLOR: YELLOW
CREAT SERPL-MCNC: 1.58 MG/DL
CREAT SPEC-SCNC: 100 MG/DL
CREAT/PROT UR: 0.6 RATIO
EOSINOPHIL # BLD AUTO: 0.47 K/UL
EOSINOPHIL NFR BLD AUTO: 5.1 %
GLUCOSE QUALITATIVE U: ABNORMAL
GLUCOSE SERPL-MCNC: 154 MG/DL
HCT VFR BLD CALC: 46 %
HGB BLD-MCNC: 14.1 G/DL
HYALINE CASTS: 2 /LPF
IMM GRANULOCYTES NFR BLD AUTO: 0.9 %
KETONES URINE: NEGATIVE
LEUKOCYTE ESTERASE URINE: NEGATIVE
LYMPHOCYTES # BLD AUTO: 1.39 K/UL
LYMPHOCYTES NFR BLD AUTO: 15.2 %
MAN DIFF?: NORMAL
MCHC RBC-ENTMCNC: 27.5 PG
MCHC RBC-ENTMCNC: 30.7 GM/DL
MCV RBC AUTO: 89.8 FL
MICROSCOPIC-UA: NORMAL
MONOCYTES # BLD AUTO: 0.66 K/UL
MONOCYTES NFR BLD AUTO: 7.2 %
NEUTROPHILS # BLD AUTO: 6.49 K/UL
NEUTROPHILS NFR BLD AUTO: 70.9 %
NITRITE URINE: NEGATIVE
PARATHYROID HORMONE INTACT: 53 PG/ML
PH URINE: 5.5
PHOSPHATE SERPL-MCNC: 4.1 MG/DL
PLATELET # BLD AUTO: 266 K/UL
POTASSIUM SERPL-SCNC: 4.9 MMOL/L
PROT UR-MCNC: 57 MG/DL
PROTEIN URINE: ABNORMAL
RBC # BLD: 5.12 M/UL
RBC # FLD: 16.2 %
RED BLOOD CELLS URINE: 2 /HPF
SODIUM SERPL-SCNC: 143 MMOL/L
SPECIFIC GRAVITY URINE: 1.03
SQUAMOUS EPITHELIAL CELLS: 1 /HPF
UROBILINOGEN URINE: NORMAL
WBC # FLD AUTO: 9.15 K/UL
WHITE BLOOD CELLS URINE: 2 /HPF

## 2021-10-11 NOTE — H&P PST ADULT - FUNCTIONAL LEVEL PRIOR: TOILETING
Normal vision: sees adequately in most situations; can see medication labels, newsprint 0 = independent

## 2021-10-12 ENCOUNTER — LABORATORY RESULT (OUTPATIENT)
Age: 66
End: 2021-10-12

## 2021-10-13 ENCOUNTER — APPOINTMENT (OUTPATIENT)
Dept: ENDOCRINOLOGY | Facility: CLINIC | Age: 66
End: 2021-10-13
Payer: MEDICARE

## 2021-10-13 VITALS
HEART RATE: 71 BPM | TEMPERATURE: 97.6 F | DIASTOLIC BLOOD PRESSURE: 90 MMHG | BODY MASS INDEX: 35.36 KG/M2 | OXYGEN SATURATION: 97 % | SYSTOLIC BLOOD PRESSURE: 140 MMHG | WEIGHT: 247 LBS | HEIGHT: 70 IN | RESPIRATION RATE: 16 BRPM

## 2021-10-13 PROCEDURE — 99214 OFFICE O/P EST MOD 30 MIN: CPT

## 2021-10-13 NOTE — ASSESSMENT
[FreeTextEntry1] : DM2 mildly uncontrolled in patient with nephropathy, HTN , HLD , CHENEY.  \par Now s/p renal carcinoma resection B/L. \par \par DM2 : Bgs very good slightly low in am 70-90.  A1c 6.6.  \par continue for now glipizide XL 10 mg BID \par continue jardiance 10 mg qd \par decrease tresiba to 36 u HS  \par continue humalog 8-12-16 u TID w meals. skipping when eating salads. \par nephropathy  CKD stage Iv : GFR better. followup with nephrology. \par continue low carb diet and he lost already 10 lbs. \par start walking 30 min per day \par Bgs 4 x day \par krishan/c ratio normal.  \par CHENEY : LFts normal \par reccomm covid vaccine \par \par HTN:   at target. continue management per cardiology\par I advised low fat/low cholesterol diet, low salt diet, and weight loss\par \par HLD:  LDL at target. continue crestor \par low fat/low cholesterol diet and weight loss advised\par \par obesity:  encouraged more exercise walking 30 min 3 x week\par discussed diet and exercise and low carb diet. \par \par Goiter: FNA both nodules thyroid August 2021 benign. Monitor and repeat US in 6 mos. \par repeat US in Feb 2022 \par \par

## 2021-10-13 NOTE — PHYSICAL EXAM
[Alert] : alert [Well Nourished] : well nourished [No Acute Distress] : no acute distress [Well Developed] : well developed [Normal Sclera/Conjunctiva] : normal sclera/conjunctiva [EOMI] : extra ocular movement intact [No Proptosis] : no proptosis [Normal Oropharynx] : the oropharynx was normal [Thyroid Not Enlarged] : the thyroid was not enlarged [No Respiratory Distress] : no respiratory distress [No Accessory Muscle Use] : no accessory muscle use [Clear to Auscultation] : lungs were clear to auscultation bilaterally [Normal S1, S2] : normal S1 and S2 [Normal Rate] : heart rate was normal [Regular Rhythm] : with a regular rhythm [No Edema] : no peripheral edema [Pedal Pulses Normal] : the pedal pulses are present [Normal Bowel Sounds] : normal bowel sounds [Not Tender] : non-tender [Not Distended] : not distended [Soft] : abdomen soft [Normal Anterior Cervical Nodes] : no anterior cervical lymphadenopathy [Spine Straight] : spine straight [Normal Gait] : normal gait [No Rash] : no rash [No Tremors] : no tremors [Oriented x3] : oriented to person, place, and time [Acanthosis Nigricans] : no acanthosis nigricans [de-identified] : thryodi nodularity B/L

## 2021-11-04 ENCOUNTER — APPOINTMENT (OUTPATIENT)
Dept: INTERNAL MEDICINE | Facility: CLINIC | Age: 66
End: 2021-11-04
Payer: MEDICARE

## 2021-11-04 VITALS
HEIGHT: 70 IN | HEART RATE: 84 BPM | SYSTOLIC BLOOD PRESSURE: 135 MMHG | TEMPERATURE: 97 F | DIASTOLIC BLOOD PRESSURE: 85 MMHG | WEIGHT: 246 LBS | OXYGEN SATURATION: 98 % | RESPIRATION RATE: 13 BRPM | BODY MASS INDEX: 35.22 KG/M2

## 2021-11-04 PROCEDURE — 36415 COLL VENOUS BLD VENIPUNCTURE: CPT

## 2021-11-04 PROCEDURE — 99214 OFFICE O/P EST MOD 30 MIN: CPT | Mod: 25

## 2021-11-04 NOTE — HISTORY OF PRESENT ILLNESS
[FreeTextEntry1] : Patient presents for followup on hypertension/hyperlipidemia/type 2 diabetes. Patient is currently on Cartia/Toprol/hydralazine for hypertension,On Crestor for hyperlipidemia and is on NovoLog/Lantus/glipizide/jardiance for type 2 diabetes\par -Last blood work showed PSA higher at 2.5, was 0.9, to be repeated\par -s/p covid, recovered well\par -Had hypoglycemic event, endocrinology adjusted insulin\par \par \par \par \par  \par \par \par

## 2021-11-04 NOTE — ASSESSMENT
[FreeTextEntry1] : Venipuncture done in our office, Labs sent out.Patient advised to continue present medications with diet and exercise. Patient will follow up with specialists as scheduled. Patient will return to the office in 3months\par \par \par 24-hour urine was done 7/2021\par MA=10/2021\par specialists include..\par 1. cardiology-Dr. Eckert\par 2. ophthalmology-Dr.Goldberg-10/2020 "to do"\par 3. podiatry-NO LONGER GOES\par 4. apnea doctor-Dr. Live\par 5. gastro--Dr. Sanchez\par 6.-Dr. Michelle\par 7.Endocrine-Dr. Nino\par 8.renal-Dr. Gong\par colonoscopy 3/18\par abdominal sonogram=1/2021=fatty liver\par discussed shingles vaccine/To skip flu shot at this time/Covid vaccine rec 90 days post infusion so dec\par carotid sonogram was 10/2020=no stenosis\par Echo was 7/2019\par stress test 7/2021\par Hep C screen=12/2020\par thyroid sonogram via endocrinology 7/2021\par CT lung screening not applicable as patient quit greater than 30 years ago\par CT lung done on 10/2020 =stable lung nodule=rx given for followup

## 2021-11-05 ENCOUNTER — NON-APPOINTMENT (OUTPATIENT)
Age: 66
End: 2021-11-05

## 2021-11-05 LAB
ALBUMIN SERPL ELPH-MCNC: 4.5 G/DL
ALP BLD-CCNC: 121 U/L
ALT SERPL-CCNC: 45 U/L
ANION GAP SERPL CALC-SCNC: 14 MMOL/L
AST SERPL-CCNC: 36 U/L
BASOPHILS # BLD AUTO: 0.06 K/UL
BASOPHILS NFR BLD AUTO: 0.6 %
BILIRUB SERPL-MCNC: 0.5 MG/DL
BUN SERPL-MCNC: 22 MG/DL
CALCIUM SERPL-MCNC: 9.8 MG/DL
CHLORIDE SERPL-SCNC: 106 MMOL/L
CHOLEST SERPL-MCNC: 182 MG/DL
CO2 SERPL-SCNC: 22 MMOL/L
CREAT SERPL-MCNC: 1.65 MG/DL
DIGOXIN SERPL-MCNC: <0.3 NG/ML
EOSINOPHIL # BLD AUTO: 0.11 K/UL
EOSINOPHIL NFR BLD AUTO: 1.1 %
ESTIMATED AVERAGE GLUCOSE: 140 MG/DL
GLUCOSE SERPL-MCNC: 129 MG/DL
HBA1C MFR BLD HPLC: 6.5 %
HCT VFR BLD CALC: 51.6 %
HDLC SERPL-MCNC: 46 MG/DL
HGB BLD-MCNC: 15.9 G/DL
IMM GRANULOCYTES NFR BLD AUTO: 0.8 %
LDLC SERPL CALC-MCNC: 107 MG/DL
LYMPHOCYTES # BLD AUTO: 1.67 K/UL
LYMPHOCYTES NFR BLD AUTO: 16.4 %
MAGNESIUM SERPL-MCNC: 2.1 MG/DL
MAN DIFF?: NORMAL
MCHC RBC-ENTMCNC: 27.7 PG
MCHC RBC-ENTMCNC: 30.8 GM/DL
MCV RBC AUTO: 89.9 FL
MONOCYTES # BLD AUTO: 0.86 K/UL
MONOCYTES NFR BLD AUTO: 8.5 %
NEUTROPHILS # BLD AUTO: 7.39 K/UL
NEUTROPHILS NFR BLD AUTO: 72.6 %
NONHDLC SERPL-MCNC: 136 MG/DL
PLATELET # BLD AUTO: 254 K/UL
POTASSIUM SERPL-SCNC: 4.9 MMOL/L
PROT SERPL-MCNC: 7.9 G/DL
PSA SERPL-MCNC: 0.93 NG/ML
RBC # BLD: 5.74 M/UL
RBC # FLD: 17.1 %
SODIUM SERPL-SCNC: 142 MMOL/L
TRIGL SERPL-MCNC: 144 MG/DL
TSH SERPL-ACNC: 3.19 UIU/ML
WBC # FLD AUTO: 10.17 K/UL

## 2021-11-12 ENCOUNTER — APPOINTMENT (OUTPATIENT)
Dept: UROLOGY | Facility: CLINIC | Age: 66
End: 2021-11-12

## 2021-11-26 ENCOUNTER — APPOINTMENT (OUTPATIENT)
Dept: MRI IMAGING | Facility: CLINIC | Age: 66
End: 2021-11-26
Payer: MEDICARE

## 2021-11-26 ENCOUNTER — OUTPATIENT (OUTPATIENT)
Dept: OUTPATIENT SERVICES | Facility: HOSPITAL | Age: 66
LOS: 1 days | End: 2021-11-26
Payer: MEDICARE

## 2021-11-26 DIAGNOSIS — C64.2 MALIGNANT NEOPLASM OF LEFT KIDNEY, EXCEPT RENAL PELVIS: ICD-10-CM

## 2021-11-26 DIAGNOSIS — I48.91 UNSPECIFIED ATRIAL FIBRILLATION: Chronic | ICD-10-CM

## 2021-11-26 DIAGNOSIS — Z98.890 OTHER SPECIFIED POSTPROCEDURAL STATES: Chronic | ICD-10-CM

## 2021-11-26 DIAGNOSIS — Z90.5 ACQUIRED ABSENCE OF KIDNEY: Chronic | ICD-10-CM

## 2021-11-26 PROCEDURE — A9585: CPT

## 2021-11-26 PROCEDURE — 74183 MRI ABD W/O CNTR FLWD CNTR: CPT | Mod: 26,MF

## 2021-11-26 PROCEDURE — G1004: CPT

## 2021-11-26 PROCEDURE — 74183 MRI ABD W/O CNTR FLWD CNTR: CPT | Mod: MF

## 2021-11-29 ENCOUNTER — APPOINTMENT (OUTPATIENT)
Dept: CT IMAGING | Facility: CLINIC | Age: 66
End: 2021-11-29
Payer: MEDICARE

## 2021-11-29 ENCOUNTER — OUTPATIENT (OUTPATIENT)
Dept: OUTPATIENT SERVICES | Facility: HOSPITAL | Age: 66
LOS: 1 days | End: 2021-11-29
Payer: MEDICARE

## 2021-11-29 DIAGNOSIS — R91.8 OTHER NONSPECIFIC ABNORMAL FINDING OF LUNG FIELD: ICD-10-CM

## 2021-11-29 DIAGNOSIS — Z98.890 OTHER SPECIFIED POSTPROCEDURAL STATES: Chronic | ICD-10-CM

## 2021-11-29 DIAGNOSIS — I48.91 UNSPECIFIED ATRIAL FIBRILLATION: Chronic | ICD-10-CM

## 2021-11-29 DIAGNOSIS — Z90.5 ACQUIRED ABSENCE OF KIDNEY: Chronic | ICD-10-CM

## 2021-11-29 PROCEDURE — 71250 CT THORAX DX C-: CPT | Mod: 26,MF

## 2021-11-29 PROCEDURE — G1004: CPT

## 2021-11-29 PROCEDURE — 71250 CT THORAX DX C-: CPT | Mod: MF

## 2021-12-03 ENCOUNTER — NON-APPOINTMENT (OUTPATIENT)
Age: 66
End: 2021-12-03

## 2021-12-29 ENCOUNTER — APPOINTMENT (OUTPATIENT)
Dept: PULMONOLOGY | Facility: CLINIC | Age: 66
End: 2021-12-29
Payer: MEDICARE

## 2021-12-29 VITALS
WEIGHT: 250 LBS | SYSTOLIC BLOOD PRESSURE: 137 MMHG | DIASTOLIC BLOOD PRESSURE: 70 MMHG | OXYGEN SATURATION: 98 % | HEART RATE: 69 BPM | BODY MASS INDEX: 35.87 KG/M2

## 2021-12-29 PROCEDURE — 99213 OFFICE O/P EST LOW 20 MIN: CPT

## 2021-12-29 NOTE — DISCUSSION/SUMMARY
[FreeTextEntry1] : Moderate to severe MARIANA well controlled on nocturnal AutoPap\par Compliant with and benefitting from nocturnal AutoPap\par Stable inconsequential Left pleural effusion and tiny intrapulmonic LNs\par CARVAJAL responded to cardiac medication adjustment - CT not c/w amiodarone toxicity - full PFT scheduled for completeness

## 2021-12-29 NOTE — HISTORY OF PRESENT ILLNESS
[FreeTextEntry1] : 61 yo male with moderate to severe vaishali maintained on nocturnal nasal auto titrating cpap at 7-17 cm H20\par Excellent compliance (>8 hrs/night). AHI on cpap=1. No xs leak\par \par Partial nephrectomy for cancer at [facility] in April\par CT in February of 2018 with stable small L effusion and stable tiny intrapulmonic LNs\par \par Cardiomyopathy managed by Dr Morales\par Doing well with cpap\par \par 6/18/20\par Loves AutoPap\par Needs medium P10 mask, heated tubing and headgear - hasn't received though ordered \par \par 12/28/20\par No new supplies since 2018\par \par 6/23/21\par Getting supplies\par Sleeping well\par Semi retired\par \par 12/29/21\par Sleeping well with APAP\par CARVAJAL in October - responded to cardiac medication adjustment \par CT Chest 11/29/21: Not suggestive of Amiodarone toxicity \par

## 2021-12-30 ENCOUNTER — NON-APPOINTMENT (OUTPATIENT)
Age: 66
End: 2021-12-30

## 2022-01-07 ENCOUNTER — APPOINTMENT (OUTPATIENT)
Dept: NEPHROLOGY | Facility: CLINIC | Age: 67
End: 2022-01-07

## 2022-01-10 LAB
ALBUMIN SERPL ELPH-MCNC: 4.2 G/DL
ALP BLD-CCNC: 107 U/L
ALT SERPL-CCNC: 39 U/L
ANION GAP SERPL CALC-SCNC: 11 MMOL/L
AST SERPL-CCNC: 26 U/L
BASOPHILS # BLD AUTO: 0.07 K/UL
BASOPHILS NFR BLD AUTO: 0.7 %
BILIRUB SERPL-MCNC: 0.3 MG/DL
BUN SERPL-MCNC: 22 MG/DL
CALCIUM SERPL-MCNC: 9.6 MG/DL
CHLORIDE SERPL-SCNC: 107 MMOL/L
CHOLEST SERPL-MCNC: 160 MG/DL
CO2 SERPL-SCNC: 26 MMOL/L
CREAT SERPL-MCNC: 1.89 MG/DL
CREAT SPEC-SCNC: 93 MG/DL
EOSINOPHIL # BLD AUTO: 0.15 K/UL
EOSINOPHIL NFR BLD AUTO: 1.5 %
ESTIMATED AVERAGE GLUCOSE: 151 MG/DL
GLUCOSE SERPL-MCNC: 138 MG/DL
HBA1C MFR BLD HPLC: 6.9 %
HCT VFR BLD CALC: 51.9 %
HDLC SERPL-MCNC: 47 MG/DL
HGB BLD-MCNC: 16.1 G/DL
IMM GRANULOCYTES NFR BLD AUTO: 0.6 %
LDLC SERPL CALC-MCNC: 87 MG/DL
LDLC SERPL DIRECT ASSAY-MCNC: 89 MG/DL
LYMPHOCYTES # BLD AUTO: 1.93 K/UL
LYMPHOCYTES NFR BLD AUTO: 18.9 %
MAN DIFF?: NORMAL
MCHC RBC-ENTMCNC: 27.2 PG
MCHC RBC-ENTMCNC: 31 GM/DL
MCV RBC AUTO: 87.5 FL
MICROALBUMIN 24H UR DL<=1MG/L-MCNC: 50.5 MG/DL
MICROALBUMIN/CREAT 24H UR-RTO: 542 MG/G
MONOCYTES # BLD AUTO: 0.86 K/UL
MONOCYTES NFR BLD AUTO: 8.4 %
NEUTROPHILS # BLD AUTO: 7.12 K/UL
NEUTROPHILS NFR BLD AUTO: 69.9 %
NONHDLC SERPL-MCNC: 113 MG/DL
PLATELET # BLD AUTO: 221 K/UL
POTASSIUM SERPL-SCNC: 5 MMOL/L
PROT SERPL-MCNC: 7.4 G/DL
RBC # BLD: 5.93 M/UL
RBC # FLD: 14.1 %
SODIUM SERPL-SCNC: 145 MMOL/L
TRIGL SERPL-MCNC: 129 MG/DL
WBC # FLD AUTO: 10.19 K/UL

## 2022-01-11 ENCOUNTER — APPOINTMENT (OUTPATIENT)
Dept: ENDOCRINOLOGY | Facility: CLINIC | Age: 67
End: 2022-01-11
Payer: MEDICARE

## 2022-01-11 ENCOUNTER — NON-APPOINTMENT (OUTPATIENT)
Age: 67
End: 2022-01-11

## 2022-01-11 VITALS
SYSTOLIC BLOOD PRESSURE: 134 MMHG | BODY MASS INDEX: 35.93 KG/M2 | WEIGHT: 251 LBS | OXYGEN SATURATION: 98 % | HEART RATE: 73 BPM | HEIGHT: 70 IN | RESPIRATION RATE: 16 BRPM | DIASTOLIC BLOOD PRESSURE: 84 MMHG | TEMPERATURE: 97.9 F

## 2022-01-11 PROCEDURE — 99214 OFFICE O/P EST MOD 30 MIN: CPT

## 2022-01-11 NOTE — PHYSICAL EXAM
[Alert] : alert [Well Nourished] : well nourished [No Acute Distress] : no acute distress [Well Developed] : well developed [Normal Sclera/Conjunctiva] : normal sclera/conjunctiva [EOMI] : extra ocular movement intact [No Proptosis] : no proptosis [Normal Oropharynx] : the oropharynx was normal [Thyroid Not Enlarged] : the thyroid was not enlarged [No Respiratory Distress] : no respiratory distress [No Accessory Muscle Use] : no accessory muscle use [Clear to Auscultation] : lungs were clear to auscultation bilaterally [Normal S1, S2] : normal S1 and S2 [Normal Rate] : heart rate was normal [Regular Rhythm] : with a regular rhythm [No Edema] : no peripheral edema [Pedal Pulses Normal] : the pedal pulses are present [Normal Bowel Sounds] : normal bowel sounds [Not Tender] : non-tender [Not Distended] : not distended [Soft] : abdomen soft [Normal Anterior Cervical Nodes] : no anterior cervical lymphadenopathy [Spine Straight] : spine straight [Normal Gait] : normal gait [No Rash] : no rash [No Tremors] : no tremors [Oriented x3] : oriented to person, place, and time [Acanthosis Nigricans] : no acanthosis nigricans [de-identified] : thryodi nodularity B/L

## 2022-01-11 NOTE — ASSESSMENT
[FreeTextEntry1] : DM2 mildly uncontrolled in patient with nephropathy, HTN , HLD , CHENEY.  \par Now s/p renal carcinoma resection B/L. \par \par DM2 : bgs am 120's   .a1c 6.9. \par continue for now glipizide XL 10 mg BID \par continue jardiance 10 mg qd \par decrease tresiba to 30 u HS  \par continue humalog 8-12-16 u TID w meals. \par nephropathy  CKD stage Iv : GFr relatively stable. \par start walking 30 min per day \par Bgs 4 x day \par CHENEY : LFts normal \par \par HTN:   at target. continue management per cardiology\par I advised low fat/low cholesterol diet, low salt diet, and weight loss\par \par HLD:  lipids good.  continue crestor \par low fat/low cholesterol diet and weight loss advised\par \par obesity:  encouraged more exercise walking 30 min 3 x week\par discussed diet and exercise and low carb diet. \par \par Goiter: FNA both nodules thyroid August 2021 benign. Monitor and repeat US in 6 mos. \par repeat US next appt \par \par

## 2022-01-24 LAB — SARS-COV-2 N GENE NPH QL NAA+PROBE: NOT DETECTED

## 2022-01-27 ENCOUNTER — APPOINTMENT (OUTPATIENT)
Dept: PULMONOLOGY | Facility: CLINIC | Age: 67
End: 2022-01-27
Payer: MEDICARE

## 2022-01-27 VITALS
DIASTOLIC BLOOD PRESSURE: 80 MMHG | HEIGHT: 69 IN | HEART RATE: 73 BPM | BODY MASS INDEX: 37.03 KG/M2 | WEIGHT: 250 LBS | RESPIRATION RATE: 16 BRPM | OXYGEN SATURATION: 98 % | SYSTOLIC BLOOD PRESSURE: 130 MMHG

## 2022-01-27 VITALS — WEIGHT: 247 LBS | BODY MASS INDEX: 37.44 KG/M2 | HEIGHT: 68 IN

## 2022-01-27 DIAGNOSIS — R91.8 OTHER NONSPECIFIC ABNORMAL FINDING OF LUNG FIELD: ICD-10-CM

## 2022-01-27 DIAGNOSIS — U07.1 COVID-19: ICD-10-CM

## 2022-01-27 DIAGNOSIS — R06.00 DYSPNEA, UNSPECIFIED: ICD-10-CM

## 2022-01-27 PROCEDURE — 94010 BREATHING CAPACITY TEST: CPT

## 2022-01-27 PROCEDURE — 99213 OFFICE O/P EST LOW 20 MIN: CPT | Mod: 25

## 2022-01-27 NOTE — HISTORY OF PRESENT ILLNESS
[FreeTextEntry1] : 61 yo male with moderate to severe vaishali maintained on nocturnal nasal auto titrating cpap at 7-17 cm H20\par Excellent compliance (>8 hrs/night). AHI on cpap=1. No xs leak\par \par Partial nephrectomy for cancer at [facility] in April\par CT in February of 2018 with stable small L effusion and stable tiny intrapulmonic LNs\par \par Cardiomyopathy managed by Dr Morales\par Doing well with cpap\par \par 6/18/20\par Loves AutoPap\par Needs medium P10 mask, heated tubing and headgear - hasn't received though ordered \par \par 12/28/20\par No new supplies since 2018\par \par 6/23/21\par Getting supplies\par Sleeping well\par Semi retired\par \par 12/29/21\par Sleeping well with APAP\par CARVAJAL in October - responded to cardiac medication adjustment \par CT Chest 11/29/21: Not suggestive of Amiodarone toxicity \par \par 1/\par

## 2022-01-27 NOTE — DISCUSSION/SUMMARY
[FreeTextEntry1] : Moderate to severe MARIANA well controlled on nocturnal AutoPap\par Compliant with and benefitting from nocturnal AutoPap\par Stable inconsequential Left pleural effusion and tiny intrapulmonic LNs\par CARVAJAL responded to cardiac medication adjustment - CT not c/w amiodarone toxicity - Spirometry is also normal and against Amiodarone lung toxicity \par New thyroid nodule being address\par Some mild cold sensitivity - ordering albuterol MDI for prn use

## 2022-02-01 ENCOUNTER — APPOINTMENT (OUTPATIENT)
Dept: INTERNAL MEDICINE | Facility: CLINIC | Age: 67
End: 2022-02-01
Payer: MEDICARE

## 2022-02-01 VITALS
DIASTOLIC BLOOD PRESSURE: 85 MMHG | TEMPERATURE: 97 F | HEIGHT: 69 IN | OXYGEN SATURATION: 98 % | HEART RATE: 72 BPM | WEIGHT: 247 LBS | RESPIRATION RATE: 14 BRPM | BODY MASS INDEX: 36.58 KG/M2 | SYSTOLIC BLOOD PRESSURE: 135 MMHG

## 2022-02-01 PROCEDURE — 99214 OFFICE O/P EST MOD 30 MIN: CPT | Mod: 25

## 2022-02-01 PROCEDURE — 36415 COLL VENOUS BLD VENIPUNCTURE: CPT

## 2022-02-01 NOTE — HISTORY OF PRESENT ILLNESS
[FreeTextEntry1] : Patient presents for followup on hypertension/hyperlipidemia/type 2 diabetes. Patient is currently on Cartia/Toprol/hydralazine for hypertension,On Crestor for hyperlipidemia and is on NovoLog/Lantus/glipizide/jardiance for type 2 diabetes\par -declines covid vaccine, had covid\par -trying to work on weight\par \par \par \par \par \par  \par \par \par

## 2022-02-01 NOTE — ASSESSMENT
[FreeTextEntry1] : Venipuncture done in our office, Labs sent out.Patient advised to continue present medications with diet and exercise. Patient will follow up with specialists as scheduled. Patient will return to the office in 3months\par \par \par 24-hour urine was done 7/2021\par MA=1/2022\par specialists include..\par 1. cardiology-Dr. Eckert\par 2. ophthalmology-Dr.Goldberg-10/2020 "to do"\par 3. podiatry-NO LONGER GOES\par 4. apnea doctor-Dr. Live\par 5. gastro--Dr. Sanchez\par 6.-Dr. Michelle\par 7.Endocrine-Dr. Nino\par 8.renal-Dr. Gong\par colonoscopy 3/2018\par abdominal sonogram=1/2021=fatty liver/MRI abd 11/2021=ADRIAN\par discussed shingles vaccine/To skip flu shot at this time/Covid vaccine rec but pt declines\par carotid sonogram was 10/2020=no stenosis\par Echo  via cardio\par stress test 7/2021\par Hep C screen=12/2020\par thyroid sonogram via endocrinology 7/2021\par CT lung screening not applicable as patient quit greater than 30 years ago\par CT lung done on 11/2021

## 2022-02-02 ENCOUNTER — NON-APPOINTMENT (OUTPATIENT)
Age: 67
End: 2022-02-02

## 2022-02-02 ENCOUNTER — TRANSCRIPTION ENCOUNTER (OUTPATIENT)
Age: 67
End: 2022-02-02

## 2022-02-02 LAB
ALBUMIN SERPL ELPH-MCNC: 4.3 G/DL
ALP BLD-CCNC: 111 U/L
ALT SERPL-CCNC: 40 U/L
ANION GAP SERPL CALC-SCNC: 17 MMOL/L
AST SERPL-CCNC: 31 U/L
BASOPHILS # BLD AUTO: 0.06 K/UL
BASOPHILS NFR BLD AUTO: 0.6 %
BILIRUB SERPL-MCNC: 0.3 MG/DL
BUN SERPL-MCNC: 23 MG/DL
CALCIUM SERPL-MCNC: 9.6 MG/DL
CHLORIDE SERPL-SCNC: 108 MMOL/L
CHOLEST SERPL-MCNC: 184 MG/DL
CO2 SERPL-SCNC: 21 MMOL/L
CREAT SERPL-MCNC: 1.9 MG/DL
EOSINOPHIL # BLD AUTO: 0.18 K/UL
EOSINOPHIL NFR BLD AUTO: 1.7 %
ESTIMATED AVERAGE GLUCOSE: 163 MG/DL
GLUCOSE SERPL-MCNC: 156 MG/DL
HBA1C MFR BLD HPLC: 7.3 %
HCT VFR BLD CALC: 52 %
HDLC SERPL-MCNC: 47 MG/DL
HGB BLD-MCNC: 16.5 G/DL
IMM GRANULOCYTES NFR BLD AUTO: 0.5 %
LDLC SERPL CALC-MCNC: 101 MG/DL
LYMPHOCYTES # BLD AUTO: 1.81 K/UL
LYMPHOCYTES NFR BLD AUTO: 17.4 %
MAGNESIUM SERPL-MCNC: 2.1 MG/DL
MAN DIFF?: NORMAL
MCHC RBC-ENTMCNC: 27.1 PG
MCHC RBC-ENTMCNC: 31.7 GM/DL
MCV RBC AUTO: 85.5 FL
MONOCYTES # BLD AUTO: 0.89 K/UL
MONOCYTES NFR BLD AUTO: 8.6 %
NEUTROPHILS # BLD AUTO: 7.4 K/UL
NEUTROPHILS NFR BLD AUTO: 71.2 %
NONHDLC SERPL-MCNC: 137 MG/DL
PLATELET # BLD AUTO: 229 K/UL
POTASSIUM SERPL-SCNC: 4.8 MMOL/L
PROT SERPL-MCNC: 7.8 G/DL
RBC # BLD: 6.08 M/UL
RBC # FLD: 14.4 %
SODIUM SERPL-SCNC: 145 MMOL/L
TRIGL SERPL-MCNC: 179 MG/DL
TSH SERPL-ACNC: 3.23 UIU/ML
WBC # FLD AUTO: 10.39 K/UL

## 2022-03-25 ENCOUNTER — APPOINTMENT (OUTPATIENT)
Dept: NEPHROLOGY | Facility: CLINIC | Age: 67
End: 2022-03-25
Payer: MEDICARE

## 2022-03-25 VITALS
OXYGEN SATURATION: 98 % | HEIGHT: 69 IN | BODY MASS INDEX: 37.33 KG/M2 | SYSTOLIC BLOOD PRESSURE: 152 MMHG | WEIGHT: 252 LBS | HEART RATE: 76 BPM | DIASTOLIC BLOOD PRESSURE: 98 MMHG

## 2022-03-25 PROCEDURE — 99214 OFFICE O/P EST MOD 30 MIN: CPT | Mod: 25

## 2022-03-25 PROCEDURE — 36415 COLL VENOUS BLD VENIPUNCTURE: CPT

## 2022-03-25 RX ORDER — PEN NEEDLE, DIABETIC 29 G X1/2"
32G X 4 MM NEEDLE, DISPOSABLE MISCELLANEOUS
Qty: 400 | Refills: 1 | Status: DISCONTINUED | COMMUNITY
Start: 2018-10-02 | End: 2022-03-25

## 2022-03-28 DIAGNOSIS — R80.9 PROTEINURIA, UNSPECIFIED: ICD-10-CM

## 2022-03-28 LAB
25(OH)D3 SERPL-MCNC: 24.1 NG/ML
ALBUMIN SERPL ELPH-MCNC: 4.1 G/DL
ANION GAP SERPL CALC-SCNC: 12 MMOL/L
APPEARANCE: CLEAR
BACTERIA: NEGATIVE
BASOPHILS # BLD AUTO: 0.06 K/UL
BASOPHILS NFR BLD AUTO: 0.5 %
BILIRUBIN URINE: NEGATIVE
BLOOD URINE: NORMAL
BUN SERPL-MCNC: 23 MG/DL
CALCIUM SERPL-MCNC: 9.1 MG/DL
CALCIUM SERPL-MCNC: 9.1 MG/DL
CHLORIDE SERPL-SCNC: 107 MMOL/L
CHOLEST SERPL-MCNC: 172 MG/DL
CO2 SERPL-SCNC: 24 MMOL/L
COLOR: NORMAL
CREAT SERPL-MCNC: 1.73 MG/DL
CREAT SPEC-SCNC: 76 MG/DL
CREAT/PROT UR: 1.6 RATIO
EGFR: 43 ML/MIN/1.73M2
EOSINOPHIL # BLD AUTO: 0.14 K/UL
EOSINOPHIL NFR BLD AUTO: 1.3 %
ESTIMATED AVERAGE GLUCOSE: 166 MG/DL
GLUCOSE QUALITATIVE U: ABNORMAL
GLUCOSE SERPL-MCNC: 84 MG/DL
HBA1C MFR BLD HPLC: 7.4 %
HCT VFR BLD CALC: 47.4 %
HDLC SERPL-MCNC: 37 MG/DL
HGB BLD-MCNC: 14.9 G/DL
HYALINE CASTS: 2 /LPF
IMM GRANULOCYTES NFR BLD AUTO: 1.1 %
KETONES URINE: NEGATIVE
LDLC SERPL CALC-MCNC: 77 MG/DL
LEUKOCYTE ESTERASE URINE: NEGATIVE
LYMPHOCYTES # BLD AUTO: 2.04 K/UL
LYMPHOCYTES NFR BLD AUTO: 18.6 %
MAN DIFF?: NORMAL
MCHC RBC-ENTMCNC: 27.4 PG
MCHC RBC-ENTMCNC: 31.4 GM/DL
MCV RBC AUTO: 87.1 FL
MICROSCOPIC-UA: NORMAL
MONOCYTES # BLD AUTO: 0.82 K/UL
MONOCYTES NFR BLD AUTO: 7.5 %
NEUTROPHILS # BLD AUTO: 7.78 K/UL
NEUTROPHILS NFR BLD AUTO: 71 %
NITRITE URINE: NEGATIVE
NONHDLC SERPL-MCNC: 135 MG/DL
PARATHYROID HORMONE INTACT: 71 PG/ML
PH URINE: 6
PHOSPHATE SERPL-MCNC: 3.5 MG/DL
PLATELET # BLD AUTO: 268 K/UL
POTASSIUM SERPL-SCNC: 4.6 MMOL/L
PROT UR-MCNC: 119 MG/DL
PROTEIN URINE: ABNORMAL
RBC # BLD: 5.44 M/UL
RBC # FLD: 15.9 %
RED BLOOD CELLS URINE: 3 /HPF
SODIUM SERPL-SCNC: 143 MMOL/L
SPECIFIC GRAVITY URINE: 1.02
SQUAMOUS EPITHELIAL CELLS: 1 /HPF
TRIGL SERPL-MCNC: 288 MG/DL
URATE SERPL-MCNC: 5 MG/DL
UROBILINOGEN URINE: NORMAL
WBC # FLD AUTO: 10.96 K/UL
WHITE BLOOD CELLS URINE: 1 /HPF

## 2022-03-28 NOTE — H&P PST ADULT - CIGARETTE, PACK YRS
Current Diet  Milk 10oz 2%  Water 30oz  Juice  0oz  Soda 0  Servings of  Fruits  3  Vegetables 2   Dairy 2  Meat  1  Starch 4               10

## 2022-03-28 NOTE — PHYSICAL EXAM
[General Appearance - Alert] : alert [General Appearance - In No Acute Distress] : in no acute distress [General Appearance - Well Nourished] : well nourished [General Appearance - Well Developed] : well developed [General Appearance - Well-Appearing] : healthy appearing [Sclera] : the sclera and conjunctiva were normal [PERRL With Normal Accommodation] : pupils were equal in size, round, and reactive to light [Extraocular Movements] : extraocular movements were intact [Strabismus] : no strabismus was seen [Outer Ear] : the ears and nose were normal in appearance [Hearing Threshold Finger Rub Not [place]] : hearing was normal [Examination Of The Oral Cavity] : the lips and gums were normal [Both Tympanic Membranes Were Examined] : both tympanic membranes were normal [Nasal Cavity] : the nasal mucosa and septum were normal [Oropharynx] : the oropharynx was normal [Neck Appearance] : the appearance of the neck was normal [Neck Cervical Mass (___cm)] : no neck mass was observed [Jugular Venous Distention Increased] : there was no jugular-venous distention [Thyroid Diffuse Enlargement] : the thyroid was not enlarged [Thyroid Nodule] : there were no palpable thyroid nodules [Exaggerated Use Of Accessory Muscles For Inspiration] : no accessory muscle use [Auscultation Breath Sounds / Voice Sounds] : lungs were clear to auscultation bilaterally [Chest Palpation] : palpation of the chest revealed no abnormalities [Lungs Percussion] : the lungs were normal to percussion [Heart Rate And Rhythm] : heart rate was normal and rhythm regular [Heart Sounds] : normal S1 and S2 [Heart Sounds Gallop] : no gallops [Murmurs] : no murmurs [Heart Sounds Pericardial Friction Rub] : no pericardial rub [Edema] : there was no peripheral edema [Full Pulse] : the pedal pulses are present [Bowel Sounds] : normal bowel sounds [Abdomen Soft] : soft [Abdomen Tenderness] : non-tender [Abdomen Mass (___ Cm)] : no abdominal mass palpated [Cervical Lymph Nodes Enlarged Posterior Bilaterally] : posterior cervical [Cervical Lymph Nodes Enlarged Anterior Bilaterally] : anterior cervical [Supraclavicular Lymph Nodes Enlarged Bilaterally] : supraclavicular [Axillary Lymph Nodes Enlarged Bilaterally] : axillary [Femoral Lymph Nodes Enlarged Bilaterally] : femoral [Inguinal Lymph Nodes Enlarged Bilaterally] : inguinal [No CVA Tenderness] : no ~M costovertebral angle tenderness [No Spinal Tenderness] : no spinal tenderness [Abnormal Walk] : normal gait [Nail Clubbing] : no clubbing  or cyanosis of the fingernails [Musculoskeletal - Swelling] : no joint swelling seen [Motor Tone] : muscle strength and tone were normal [Skin Color & Pigmentation] : normal skin color and pigmentation [Skin Turgor] : normal skin turgor [] : no rash [Deep Tendon Reflexes (DTR)] : deep tendon reflexes were 2+ and symmetric [Sensation] : the sensory exam was normal to light touch and pinprick [No Focal Deficits] : no focal deficits [Oriented To Time, Place, And Person] : oriented to person, place, and time [Impaired Insight] : insight and judgment were intact [Affect] : the affect was normal [FreeTextEntry1] : AF,

## 2022-04-11 LAB
ALBUMIN SERPL ELPH-MCNC: 4.2 G/DL
ALP BLD-CCNC: 107 U/L
ALT SERPL-CCNC: 40 U/L
ANION GAP SERPL CALC-SCNC: 11 MMOL/L
AST SERPL-CCNC: 29 U/L
BASOPHILS # BLD AUTO: 0.03 K/UL
BASOPHILS NFR BLD AUTO: 0.4 %
BILIRUB SERPL-MCNC: 0.3 MG/DL
BUN SERPL-MCNC: 33 MG/DL
CALCIUM SERPL-MCNC: 9.1 MG/DL
CHLORIDE SERPL-SCNC: 107 MMOL/L
CHOLEST SERPL-MCNC: 157 MG/DL
CO2 SERPL-SCNC: 24 MMOL/L
CREAT SERPL-MCNC: 1.96 MG/DL
CREAT SPEC-SCNC: 54 MG/DL
EGFR: 37 ML/MIN/1.73M2
EOSINOPHIL # BLD AUTO: 0.11 K/UL
EOSINOPHIL NFR BLD AUTO: 1.3 %
ESTIMATED AVERAGE GLUCOSE: 166 MG/DL
GLUCOSE SERPL-MCNC: 199 MG/DL
HBA1C MFR BLD HPLC: 7.4 %
HCT VFR BLD CALC: 49.3 %
HDLC SERPL-MCNC: 40 MG/DL
HGB BLD-MCNC: 15.2 G/DL
IMM GRANULOCYTES NFR BLD AUTO: 0.5 %
LDLC SERPL CALC-MCNC: 81 MG/DL
LDLC SERPL DIRECT ASSAY-MCNC: 87 MG/DL
LYMPHOCYTES # BLD AUTO: 1.22 K/UL
LYMPHOCYTES NFR BLD AUTO: 14.8 %
MAN DIFF?: NORMAL
MCHC RBC-ENTMCNC: 27.5 PG
MCHC RBC-ENTMCNC: 30.8 GM/DL
MCV RBC AUTO: 89.2 FL
MICROALBUMIN 24H UR DL<=1MG/L-MCNC: 28.2 MG/DL
MICROALBUMIN/CREAT 24H UR-RTO: 527 MG/G
MONOCYTES # BLD AUTO: 0.99 K/UL
MONOCYTES NFR BLD AUTO: 12 %
NEUTROPHILS # BLD AUTO: 5.88 K/UL
NEUTROPHILS NFR BLD AUTO: 71 %
NONHDLC SERPL-MCNC: 118 MG/DL
PLATELET # BLD AUTO: 191 K/UL
POTASSIUM SERPL-SCNC: 4.8 MMOL/L
PROT SERPL-MCNC: 7.1 G/DL
RBC # BLD: 5.53 M/UL
RBC # FLD: 17.5 %
SODIUM SERPL-SCNC: 142 MMOL/L
T4 FREE SERPL-MCNC: 1.2 NG/DL
TRIGL SERPL-MCNC: 183 MG/DL
TSH SERPL-ACNC: 2.85 UIU/ML
WBC # FLD AUTO: 8.27 K/UL

## 2022-04-12 ENCOUNTER — APPOINTMENT (OUTPATIENT)
Dept: ENDOCRINOLOGY | Facility: CLINIC | Age: 67
End: 2022-04-12
Payer: MEDICARE

## 2022-04-12 ENCOUNTER — TRANSCRIPTION ENCOUNTER (OUTPATIENT)
Age: 67
End: 2022-04-12

## 2022-04-12 VITALS
DIASTOLIC BLOOD PRESSURE: 90 MMHG | OXYGEN SATURATION: 98 % | SYSTOLIC BLOOD PRESSURE: 140 MMHG | WEIGHT: 254.25 LBS | TEMPERATURE: 97.3 F | RESPIRATION RATE: 16 BRPM | HEART RATE: 67 BPM | BODY MASS INDEX: 37.66 KG/M2 | HEIGHT: 69 IN

## 2022-04-12 PROCEDURE — 99214 OFFICE O/P EST MOD 30 MIN: CPT

## 2022-04-12 RX ORDER — BLOOD SUGAR DIAGNOSTIC
STRIP MISCELLANEOUS
Qty: 400 | Refills: 1 | Status: ACTIVE | COMMUNITY
Start: 2022-04-12 | End: 1900-01-01

## 2022-04-12 NOTE — ASSESSMENT
[Importance of Diet and Exercise] : importance of diet and exercise to improve glycemic control, achieve weight loss and improve cardiovascular health [Exercise/Effect on Glucose] : exercise/effect on glucose [Self Monitoring of Blood Glucose] : self monitoring of blood glucose [Retinopathy Screening] : Patient was referred to ophthalmology for retinopathy screening [Weight Loss] : weight loss [FreeTextEntry1] : DM2 mildly uncontrolled in patient with nephropathy, HTN , HLD , CHENEY.  \par Now s/p renal carcinoma resection B/L. \par \par DM2 : bgs am 120's   .a1c 6.9. \par check bgs 4x day . Advsied to check Bgs before taking insulins. \par he feels shaky at times but does not check. he skips meal doses of insulin when shaky. \par continue for now glipizide XL 10 mg BID \par continue jardiance 10 mg qd \par continue tresiba to 35 u HS  \par continue humalog 8-12-16 u TID w meals. \par nephropathy  CKD stage Iv : GFR stable. cont SGLt2  \par start walking 30 min per day \par Bgs 4 x day \par CHENEY : LFts normal . Will continue to monitor. \par \par HTN:   at target. continue management per cardiology\par I advised low fat/low cholesterol diet, low salt diet, and weight loss\par \par HLD:  lipids at target. continue crestor \par low fat/low cholesterol diet and weight loss advised\par \par obesity: gaining weight.   encouraged more exercise walking 30 min 3 x week or treadmill. \par discussed diet and exercise and low carb diet. \par \par Goiter: FNA both nodules thyroid August 2021 benign. \par declines compressive sx  \par check neck US \par \par logbook revised and d/w pt \par all lab results reviewed and discussed with patient with extensive discussion. All questions answered\par Laboratory tests ordered today-see list below\par Diagnosis and prognosis discussed\par Continue other current medications (other than those changed below)\par \par \par \par \par

## 2022-04-12 NOTE — PHYSICAL EXAM
[Alert] : alert [Well Nourished] : well nourished [Obese] : obese [No Acute Distress] : no acute distress [Well Developed] : well developed [Normal Sclera/Conjunctiva] : normal sclera/conjunctiva [EOMI] : extra ocular movement intact [No Proptosis] : no proptosis [Normal Oropharynx] : the oropharynx was normal [Thyroid Not Enlarged] : the thyroid was not enlarged [No Respiratory Distress] : no respiratory distress [No Accessory Muscle Use] : no accessory muscle use [Clear to Auscultation] : lungs were clear to auscultation bilaterally [Normal S1, S2] : normal S1 and S2 [Normal Rate] : heart rate was normal [Regular Rhythm] : with a regular rhythm [No Edema] : no peripheral edema [Pedal Pulses Normal] : the pedal pulses are present [Normal Bowel Sounds] : normal bowel sounds [Not Tender] : non-tender [Not Distended] : not distended [Soft] : abdomen soft [Normal Anterior Cervical Nodes] : no anterior cervical lymphadenopathy [No Rash] : no rash [No Tremors] : no tremors [Oriented x3] : oriented to person, place, and time [Acanthosis Nigricans] : no acanthosis nigricans [de-identified] : thryodi nodularity B/L

## 2022-05-03 NOTE — H&P PST ADULT - VISION (WITH CORRECTIVE LENSES IF THE PATIENT USUALLY WEARS THEM):
Eye Clamp Note Details: An eye clamp was used during the procedure. Normal vision: sees adequately in most situations; can see medication labels, newsprint

## 2022-06-03 ENCOUNTER — APPOINTMENT (OUTPATIENT)
Dept: INTERNAL MEDICINE | Facility: CLINIC | Age: 67
End: 2022-06-03
Payer: MEDICARE

## 2022-06-03 VITALS
DIASTOLIC BLOOD PRESSURE: 85 MMHG | WEIGHT: 240 LBS | RESPIRATION RATE: 14 BRPM | HEIGHT: 69 IN | BODY MASS INDEX: 35.55 KG/M2 | OXYGEN SATURATION: 98 % | SYSTOLIC BLOOD PRESSURE: 135 MMHG | HEART RATE: 71 BPM | TEMPERATURE: 97 F

## 2022-06-03 DIAGNOSIS — L25.9 UNSPECIFIED CONTACT DERMATITIS, UNSPECIFIED CAUSE: ICD-10-CM

## 2022-06-03 PROCEDURE — 36415 COLL VENOUS BLD VENIPUNCTURE: CPT

## 2022-06-03 PROCEDURE — 99214 OFFICE O/P EST MOD 30 MIN: CPT | Mod: 25

## 2022-06-03 RX ORDER — FLUTICASONE PROPIONATE 0.5 MG/G
0.05 CREAM TOPICAL TWICE DAILY
Qty: 1 | Refills: 1 | Status: ACTIVE | COMMUNITY
Start: 2022-06-03 | End: 1900-01-01

## 2022-06-03 NOTE — PHYSICAL EXAM
[General Appearance - In No Acute Distress] : in no acute distress [] : no respiratory distress [Respiration, Rhythm And Depth] : normal respiratory rhythm and effort [Auscultation Breath Sounds / Voice Sounds] : lungs were clear to auscultation bilaterally [Affect] : the affect was normal [Mood] : the mood was normal [de-identified] : +contact dermatitis appearing rash bilateral forearms [FreeTextEntry1] : + irreg with CVR

## 2022-06-03 NOTE — HISTORY OF PRESENT ILLNESS
[FreeTextEntry1] : Patient presents for followup on hypertension/hyperlipidemia/type 2 diabetes. Patient is currently on Cartia/Toprol/hydralazine for hypertension,On Crestor for hyperlipidemia and is on NovoLog/Lantus/glipizide/jardiance for type 2 diabetes\par -declines covid vaccine, had covid\par -patient has made some lifestyle changes with weight loss\par - Patient is complaining of bilateral arm pruritic rash for 2 weeks\par \par \par \par \par \par  \par \par \par

## 2022-06-04 LAB
ALBUMIN SERPL ELPH-MCNC: 4.4 G/DL
ALP BLD-CCNC: 100 U/L
ALT SERPL-CCNC: 39 U/L
ANION GAP SERPL CALC-SCNC: 22 MMOL/L
AST SERPL-CCNC: 33 U/L
BASOPHILS # BLD AUTO: 0.07 K/UL
BASOPHILS NFR BLD AUTO: 0.7 %
BILIRUB SERPL-MCNC: 0.3 MG/DL
BUN SERPL-MCNC: 24 MG/DL
CALCIUM SERPL-MCNC: 9.4 MG/DL
CHLORIDE SERPL-SCNC: 107 MMOL/L
CHOLEST SERPL-MCNC: 160 MG/DL
CO2 SERPL-SCNC: 17 MMOL/L
CREAT SERPL-MCNC: 1.97 MG/DL
EGFR: 37 ML/MIN/1.73M2
EOSINOPHIL # BLD AUTO: 0.13 K/UL
EOSINOPHIL NFR BLD AUTO: 1.3 %
ESTIMATED AVERAGE GLUCOSE: 137 MG/DL
GLUCOSE SERPL-MCNC: 150 MG/DL
HBA1C MFR BLD HPLC: 6.4 %
HCT VFR BLD CALC: 49.7 %
HDLC SERPL-MCNC: 44 MG/DL
HGB BLD-MCNC: 15.7 G/DL
IMM GRANULOCYTES NFR BLD AUTO: 0.7 %
LDLC SERPL CALC-MCNC: 91 MG/DL
LYMPHOCYTES # BLD AUTO: 1.77 K/UL
LYMPHOCYTES NFR BLD AUTO: 17.4 %
MAGNESIUM SERPL-MCNC: 2 MG/DL
MAN DIFF?: NORMAL
MCHC RBC-ENTMCNC: 27.1 PG
MCHC RBC-ENTMCNC: 31.6 GM/DL
MCV RBC AUTO: 85.7 FL
MONOCYTES # BLD AUTO: 0.81 K/UL
MONOCYTES NFR BLD AUTO: 8 %
NEUTROPHILS # BLD AUTO: 7.3 K/UL
NEUTROPHILS NFR BLD AUTO: 71.9 %
NONHDLC SERPL-MCNC: 116 MG/DL
PLATELET # BLD AUTO: 250 K/UL
POTASSIUM SERPL-SCNC: 5.2 MMOL/L
PROT SERPL-MCNC: 7.4 G/DL
RBC # BLD: 5.8 M/UL
RBC # FLD: 15 %
SODIUM SERPL-SCNC: 146 MMOL/L
TRIGL SERPL-MCNC: 127 MG/DL
TSH SERPL-ACNC: 3.73 UIU/ML
WBC # FLD AUTO: 10.15 K/UL

## 2022-06-06 ENCOUNTER — NON-APPOINTMENT (OUTPATIENT)
Age: 67
End: 2022-06-06

## 2022-06-28 ENCOUNTER — TRANSCRIPTION ENCOUNTER (OUTPATIENT)
Age: 67
End: 2022-06-28

## 2022-07-12 ENCOUNTER — APPOINTMENT (OUTPATIENT)
Dept: ENDOCRINOLOGY | Facility: CLINIC | Age: 67
End: 2022-07-12

## 2022-08-22 ENCOUNTER — APPOINTMENT (OUTPATIENT)
Dept: OPHTHALMOLOGY | Facility: CLINIC | Age: 67
End: 2022-08-22

## 2022-08-22 ENCOUNTER — NON-APPOINTMENT (OUTPATIENT)
Age: 67
End: 2022-08-22

## 2022-08-22 PROCEDURE — 92014 COMPRE OPH EXAM EST PT 1/>: CPT

## 2022-08-22 PROCEDURE — 92134 CPTRZ OPH DX IMG PST SGM RTA: CPT

## 2022-08-25 ENCOUNTER — APPOINTMENT (OUTPATIENT)
Dept: PULMONOLOGY | Facility: CLINIC | Age: 67
End: 2022-08-25

## 2022-09-20 ENCOUNTER — APPOINTMENT (OUTPATIENT)
Dept: INTERNAL MEDICINE | Facility: CLINIC | Age: 67
End: 2022-09-20

## 2022-09-20 VITALS
RESPIRATION RATE: 13 BRPM | OXYGEN SATURATION: 97 % | HEIGHT: 69 IN | HEART RATE: 78 BPM | SYSTOLIC BLOOD PRESSURE: 125 MMHG | BODY MASS INDEX: 36.58 KG/M2 | DIASTOLIC BLOOD PRESSURE: 80 MMHG | WEIGHT: 247 LBS

## 2022-09-20 DIAGNOSIS — Z13.31 ENCOUNTER FOR SCREENING FOR DEPRESSION: ICD-10-CM

## 2022-09-20 DIAGNOSIS — R97.20 ELEVATED PROSTATE, SPECIFIC ANTIGEN [PSA]: ICD-10-CM

## 2022-09-20 PROCEDURE — 36415 COLL VENOUS BLD VENIPUNCTURE: CPT

## 2022-09-20 PROCEDURE — G0438: CPT

## 2022-09-20 NOTE — HISTORY OF PRESENT ILLNESS
[FreeTextEntry1] : 67-year-old male presents for his yearly physical with history of dilated cardiomyopathy as well as atrial fib for which he takes Eliquis. \par Atrial fibrillation rate was increased therefore cardiology added an amiodarone.\par Patient also with history of hypertension for which he is on Cartia, hydralazine and metoprolol\par \par Type 2 diabetes has been treated with Lantus and glipizide. endocrinology added NovoLog before each meal.\par Also early 2021 endocrinology added Jardiance.\par \par The patient's weight is about the same stating that he follows a low carb diet but does not exercise.\par He states he had lost about 15 pounds which made him happy about he gained his weight back.\par \par Also history of obstructive sleep apnea for which the patient uses CPAP. Definite benefit\par \par Also history of hepatitis C which he is being followed by gastro-.\par \par Patient is generally feeling well without complaints including no chest pain, palpitations, shortness of breath or edema.\par \par Cardiac evaluation in 2021 secondary to dyspnea with cardiac PET scan August 2021 negative.\par Atrial fibrillation continue to be an issue therefore amiodarone added with improvement and less dyspnea on exertion.\par \par The patient's other significant history is that he was diagnosed with a left renal cancer and had a partial left nephrectomy early April 2018.\par Postoperatively he has been somewhat winsome course with patient had postoperative gross hematuria for which he needed to be readmitted with an embolization.\par Since he's had no further bleeding.\par .\par Interestingly the patient also had a right renal tumor for which he had a laparoscopic right partial nephrectomy April 2019 with it also being cancer.\par Postoperatively no complications\par The patient followed up with  November 2020 with renal ultrasound good\par MRI November 2021 showing no evidence of recurrent disease\par \par Patient notes that his mother has been diagnosed with colon cancer and passed away \par Also 2 brothers with colon polyps.\par The patient had his colonoscopy March 2018\par

## 2022-09-20 NOTE — ASSESSMENT
[FreeTextEntry1] : 67-year-old male with dilated cardiomyopathy without any evidence of cardiac or vascular decompensation but continued need for lifestyle changes with better diet and exercise and weight loss.\par \par Paroxysmal atrial fibrillation currently clinically in sinus rhythm and continues on anticoagulation\par \par Hypertension controlled\par hyperlipidemia on rosuvastatin\par \par Again long discussion with patient about needed lifestyle changes and better regimen patient with his diet to prevent episodes of hypoglycemia.\par \par The patient is now status post partial left and right partial nephrectomy for renal cell cancer who is currently stable. MRI November 2021 showing no evidence of disease. Followup with urology as scheduled\par \par For the present time patient is to continue present medications pending results of his blood work.\par \par Colonoscopy March 2018. Followup in 5 years\par Ophthalmology one to 2 times per year\par Cardiology followup as scheduled\par CT scan of the lung October 2021\par endocrinology followup\par \par Strongly recommended patient get the COVID vaccine which he refuses.\par Declines influenza vaccine\par \par Venipuncture done today in the office\par Followup in 3 months

## 2022-09-20 NOTE — PHYSICAL EXAM
[General Appearance - Alert] : alert [General Appearance - In No Acute Distress] : in no acute distress [General Appearance - Well Nourished] : well nourished [Sclera] : the sclera and conjunctiva were normal [PERRL With Normal Accommodation] : pupils were equal in size, round, and reactive to light [Extraocular Movements] : extraocular movements were intact [Outer Ear] : the ears and nose were normal in appearance [Oropharynx] : the oropharynx was normal [Neck Appearance] : the appearance of the neck was normal [Neck Cervical Mass (___cm)] : no neck mass was observed [Jugular Venous Distention Increased] : there was no jugular-venous distention [Thyroid Diffuse Enlargement] : the thyroid was not enlarged [Thyroid Nodule] : there were no palpable thyroid nodules [Auscultation Breath Sounds / Voice Sounds] : lungs were clear to auscultation bilaterally [Heart Sounds] : normal S1 and S2 [Heart Sounds Gallop] : no gallops [Murmurs] : no murmurs [Heart Sounds Pericardial Friction Rub] : no pericardial rub [Arterial Pulses Carotid] : carotid pulses were normal with no bruits [Abdominal Aorta] : the abdominal aorta was normal [Arterial Pulses Femoral] : femoral pulses were normal without bruits [Full Pulse] : the pedal pulses are present [Edema] : there was no peripheral edema [Veins - Varicosity Changes] : there were no varicosital changes [Bowel Sounds] : normal bowel sounds [Abdomen Soft] : soft [Abdomen Tenderness] : non-tender [Abdomen Mass (___ Cm)] : no abdominal mass palpated [Patient Refused] : rectal exam was refused by the patient [Cervical Lymph Nodes Enlarged Posterior Bilaterally] : posterior cervical [Cervical Lymph Nodes Enlarged Anterior Bilaterally] : anterior cervical [Supraclavicular Lymph Nodes Enlarged Bilaterally] : supraclavicular [Axillary Lymph Nodes Enlarged Bilaterally] : axillary [Femoral Lymph Nodes Enlarged Bilaterally] : femoral [Inguinal Lymph Nodes Enlarged Bilaterally] : inguinal [No Spinal Tenderness] : no spinal tenderness [Abnormal Walk] : normal gait [Nail Clubbing] : no clubbing  or cyanosis of the fingernails [Musculoskeletal - Swelling] : no joint swelling seen [Motor Tone] : muscle strength and tone were normal [Skin Color & Pigmentation] : normal skin color and pigmentation [Skin Turgor] : normal skin turgor [] : no rash [Right Foot Was Examined] : right foot was examined [Left Foot Was Examined] : left foot was examined [Normal Appearance] : normal in appearance [Normal in Appearance] : normal in appearance [Normal] : normal [2+] : 2+ in the dorsalis pedis [#1 Diminished] : number 1 was diminished [#10 Diminished] : number 10 was diminished [Cranial Nerves] : cranial nerves 2-12 were intact [No Focal Deficits] : no focal deficits [Oriented To Time, Place, And Person] : oriented to person, place, and time [Impaired Insight] : insight and judgment were intact [Affect] : the affect was normal [#2 Diminished] : number 2 was normal [#3 Diminished] : number 3 was normal [#4 Diminished] : number 4 was normal [#5 Diminished] : number 5 was normal [#6 Diminished] : number 6 was normal [#7 Diminished] : number 7 was normal [#8 Diminished] : number 8 was normal [#9 Diminished] : number 9 was normal [FreeTextEntry1] : Bilateral chronic stasis changes, Abrasions bilateral mid-tibial areas with the right worse in the last with denuded skin and no ulcer and no sign of infection

## 2022-09-20 NOTE — DATA REVIEWED
[FreeTextEntry1] : Cardiac PET scan in August 2021 = negative\par \par Echo July 2019 with EF at 40-45% with mild MR/TR, mild-moderate TR\par Holter monitor with atrial fibrillation

## 2022-09-20 NOTE — HEALTH RISK ASSESSMENT
[Good] : ~his/her~ current health as good [Former] : Former [Yes] : Yes [2 - 4 times a month (2 pts)] : 2-4 times a month (2 points) [1 or 2 (0 pts)] : 1 or 2 (0 points) [Never (0 pts)] : Never (0 points) [No falls in past year] : Patient reported no falls in the past year [0] : 2) Feeling down, depressed, or hopeless: Not at all (0) [2] : 2 [None] : None [With Significant Other] : lives with significant other [# of Members in Household ___] :  household currently consist of [unfilled] member(s) [Employed] : employed [# Of Children ___] : has [unfilled] children [Sexually Active] : sexually active [Feels Safe at Home] : Feels safe at home [Fully functional (bathing, dressing, toileting, transferring, walking, feeding)] : Fully functional (bathing, dressing, toileting, transferring, walking, feeding) [Fully functional (using the telephone, shopping, preparing meals, housekeeping, doing laundry, using] : Fully functional and needs no help or supervision to perform IADLs (using the telephone, shopping, preparing meals, housekeeping, doing laundry, using transportation, managing medications and managing finances) [Smoke Detector] : smoke detector [Carbon Monoxide Detector] : carbon monoxide detector [Seat Belt] :  uses seat belt [Sunscreen] : uses sunscreen [Discussed at today's visit] : Advance Directives Discussed at today's visit [Designated Healthcare Proxy] : Designated healthcare proxy [Name: ___] : Health Care Proxy's Name: [unfilled]  [Very Good] : ~his/her~  mood as very good [PHQ-2 Negative - No further assessment needed] : PHQ-2 Negative - No further assessment needed [Audit-CScore] : 2 [de-identified] : active [de-identified] : good [DPN1Okraf] : 0 [LowDoseCTScan] : 11/21 [Change in mental status noted] : No change in mental status noted [Reports changes in hearing] : Reports no changes in hearing [Reports changes in vision] : Reports no changes in vision [Reports changes in dental health] : Reports no changes in dental health [HIVDate] : 07/14 [HIVComments] : negative [HepatitisCDate] : 02/00 [HepatitisCComments] : Patient with  hepatitis C [FreeTextEntry2] : Alisha [de-identified] : tinnitus [AdvancecareDate] : 09/22

## 2022-09-21 ENCOUNTER — NON-APPOINTMENT (OUTPATIENT)
Age: 67
End: 2022-09-21

## 2022-09-21 ENCOUNTER — TRANSCRIPTION ENCOUNTER (OUTPATIENT)
Age: 67
End: 2022-09-21

## 2022-09-21 LAB
25(OH)D3 SERPL-MCNC: 51.9 NG/ML
ALBUMIN SERPL ELPH-MCNC: 4.3 G/DL
ALP BLD-CCNC: 99 U/L
ALT SERPL-CCNC: 41 U/L
ANION GAP SERPL CALC-SCNC: 14 MMOL/L
APPEARANCE: CLEAR
AST SERPL-CCNC: 37 U/L
BACTERIA: NEGATIVE
BASOPHILS # BLD AUTO: 0.05 K/UL
BASOPHILS NFR BLD AUTO: 0.6 %
BILIRUB SERPL-MCNC: 0.4 MG/DL
BILIRUBIN URINE: NEGATIVE
BLOOD URINE: NORMAL
BUN SERPL-MCNC: 27 MG/DL
CALCIUM SERPL-MCNC: 9.3 MG/DL
CHLORIDE SERPL-SCNC: 107 MMOL/L
CHOLEST SERPL-MCNC: 151 MG/DL
CO2 SERPL-SCNC: 21 MMOL/L
COLOR: YELLOW
CREAT SERPL-MCNC: 1.94 MG/DL
EGFR: 37 ML/MIN/1.73M2
EOSINOPHIL # BLD AUTO: 0.12 K/UL
EOSINOPHIL NFR BLD AUTO: 1.4 %
ESTIMATED AVERAGE GLUCOSE: 143 MG/DL
GLUCOSE QUALITATIVE U: ABNORMAL
GLUCOSE SERPL-MCNC: 76 MG/DL
HBA1C MFR BLD HPLC: 6.6 %
HCT VFR BLD CALC: 49.9 %
HDLC SERPL-MCNC: 41 MG/DL
HGB BLD-MCNC: 15.4 G/DL
HYALINE CASTS: 0 /LPF
IMM GRANULOCYTES NFR BLD AUTO: 0.8 %
KETONES URINE: NEGATIVE
LDLC SERPL CALC-MCNC: 79 MG/DL
LEUKOCYTE ESTERASE URINE: NEGATIVE
LYMPHOCYTES # BLD AUTO: 1.83 K/UL
LYMPHOCYTES NFR BLD AUTO: 20.7 %
MAN DIFF?: NORMAL
MCHC RBC-ENTMCNC: 27.8 PG
MCHC RBC-ENTMCNC: 30.9 GM/DL
MCV RBC AUTO: 90.1 FL
MICROSCOPIC-UA: NORMAL
MONOCYTES # BLD AUTO: 1.13 K/UL
MONOCYTES NFR BLD AUTO: 12.8 %
NEUTROPHILS # BLD AUTO: 5.63 K/UL
NEUTROPHILS NFR BLD AUTO: 63.7 %
NITRITE URINE: NEGATIVE
NONHDLC SERPL-MCNC: 110 MG/DL
PH URINE: 6
PLATELET # BLD AUTO: 204 K/UL
POTASSIUM SERPL-SCNC: 4.1 MMOL/L
PROT SERPL-MCNC: 7.5 G/DL
PROTEIN URINE: ABNORMAL
PSA SERPL-MCNC: 0.78 NG/ML
RBC # BLD: 5.54 M/UL
RBC # FLD: 15.5 %
RED BLOOD CELLS URINE: 3 /HPF
SODIUM SERPL-SCNC: 143 MMOL/L
SPECIFIC GRAVITY URINE: 1.02
SQUAMOUS EPITHELIAL CELLS: 0 /HPF
TRIGL SERPL-MCNC: 155 MG/DL
TSH SERPL-ACNC: 3.23 UIU/ML
UROBILINOGEN URINE: NORMAL
WBC # FLD AUTO: 8.83 K/UL
WHITE BLOOD CELLS URINE: 2 /HPF

## 2022-09-23 LAB
CREAT SPEC-SCNC: 113 MG/DL
MICROALBUMIN 24H UR DL<=1MG/L-MCNC: 62.9 MG/DL
MICROALBUMIN/CREAT 24H UR-RTO: 555 MG/G

## 2022-10-13 PROBLEM — Z13.31 SCREENING FOR DEPRESSION: Status: ACTIVE | Noted: 2022-09-20

## 2022-10-25 NOTE — H&P PST ADULT - LYMPHATICS COMMENTS
Graciela is a 56 year old  female who presents for annual exam.     Besides routine health maintenance, she has no other health concerns today .    HPI: here for annual exam.  She has a history of Stage 0 breast cancer in 2018.  Lumpectomy and radiation.  No reoccurrence.  [place], no HRT.  Also had colpo in  with Dr. Hernandez was benign.  Pap  was ASCUS.  No other concerns today.  Denies any PMB    The patient's PCP is  Tavares Velasco MD.        GYNECOLOGIC HISTORY:    No LMP recorded. Patient is postmenopausal.    Her current contraception method is: menopause.  She  reports that she has never smoked. She has never used smokeless tobacco.  Patient is sexually active.  STD testing offered?  Declined  Last PHQ-9 score on record = No flowsheet data found.  Last GAD7 score on record = No flowsheet data found.  Alcohol Score = 0    HEALTH MAINTENANCE:  Cholesterol:  Recent Labs   Lab Test 22  1125 10/20/21  1537 17  1001 09/22/15  0909   CHOL 184 175   < > 188   HDL 46* 40*   < > 50*   * 106*   < > 113   TRIG 122 146   < > 124   CHOLHDLRATIO  --   --   --  3.8    < > = values in this interval not displayed.     Last Mammo: 22, Result: Normal, Next Mammo: 2023   Pap:   Lab Results   Component Value Date    GYNINTERP  10/20/2021     Atypical squamous cells of undetermined significance (ASC-US)    PAP ASC-US 2020    PAP ASC-US 2018    PAP ASC-US 2015   HPV-  Colonoscopy:  18, Result: Normal, Next Colonoscopy: 10 years.  Dexa:  N/A    Health maintenance updated:  no    HISTORY:  OB History    Para Term  AB Living   10 8 8 0 2 8   SAB IAB Ectopic Multiple Live Births   2 0 0 0 0      # Outcome Date GA Lbr Dequan/2nd Weight Sex Delivery Anes PTL Lv   10 SAB            9 SAB            8 Term            7 Term            6 Term            5 Term            4 Term            3 Term            2 Term            1 Term               Obstetric Comments   NSVDs        Patient Active Problem List   Diagnosis     Ductal carcinoma in situ (DCIS) of left breast     ASCUS of cervix with negative high risk HPV     Latent tuberculosis     Environmental allergies     Cholelithiasis     Hepatic steatosis     Prediabetes     Past Surgical History:   Procedure Laterality Date     COLONOSCOPY N/A 2/12/2018    Procedure: COLONOSCOPY;  colonoscopy;  Surgeon: Sabas Villegas MD;  Location:  GI     LUMPECTOMY BREAST WITH SEED LOCALIZATION Left 4/18/2018    Procedure: LUMPECTOMY BREAST WITH SEED LOCALIZATION;  SEED LOCALIZED LEFT BREAST LUMPECTOMY ;  Surgeon: Meli Rodriguez MD;  Location:  [facility]      Social History     Tobacco Use     Smoking status: Never     Smokeless tobacco: Never   Substance Use Topics     Alcohol use: No     Alcohol/week: 0.0 standard drinks      Problem (# of Occurrences) Relation (Name,Age of Onset)    Diabetes Type 2  (2) Brother, Sister       Negative family history of: Myocardial Infarction, Cerebrovascular Disease, Coronary Artery Disease Early Onset, Colon Cancer, Breast Cancer, Ovarian Cancer            Current Outpatient Medications   Medication Sig     famotidine (PEPCID) 20 MG tablet Take 1 tablet (20 mg) by mouth 2 times daily as needed (heart burn; dyspepsia)     omeprazole (PRILOSEC) 20 MG DR capsule Take 1 capsule (20 mg) by mouth daily     polyethylene glycol (MIRALAX) 17 GM/Dose powder Take 17 g (1 capful) by mouth daily     diclofenac (VOLTAREN) 1 % topical gel Apply 4 g topically 4 times daily (Patient not taking: Reported on 10/26/2022)     estradiol (ESTRACE) 0.1 MG/GM vaginal cream Place 1 g vaginally three times a week (Patient not taking: Reported on 10/26/2022)     pantoprazole (PROTONIX) 40 MG EC tablet TAKE 1 TABLET BY MOUTH DAILY 30 MINUTES BEFORE BREAKFAST     No current facility-administered medications for this visit.     Allergies   Allergen Reactions     Ciprofloxacin Itching       Past medical, surgical, social and family  "histories were reviewed and updated in EPIC.    ROS:   12 point review of systems negative other than symptoms noted below or in the HPI.  No urinary frequency or dysuria, bladder or kidney problems    EXAM:  /80   Ht 1.607 m (5' 3.25\")   Wt 75.7 kg (166 lb 12.8 oz)   BMI 29.31 kg/m     BMI: Body mass index is 29.31 kg/m .    PHYSICAL EXAM:  Constitutional:   Appearance: Well nourished, well developed, alert, in no acute distress  Neck:  Lymph Nodes:  No lymphadenopathy present    Thyroid:  Gland size normal, nontender, no nodules or masses present  on palpation  Chest:  Respiratory Effort:  Breathing unlabored  Cardiovascular:    Heart: Auscultation:  Regular rate, normal rhythm, no murmurs present  Breasts: Inspection of Breasts:  No lymphadenopathy present., Palpation of Breasts and Axillae:  No masses present on palpation, no breast tenderness., Axillary Lymph Nodes:  No lymphadenopathy present. and No nodularity, asymmetry or nipple discharge bilaterally.  Gastrointestinal:   Abdominal Examination:  Abdomen nontender to palpation, tone normal without rigidity or guarding, no masses present, umbilicus without lesions   Liver and Spleen:  No hepatomegaly present, liver nontender to palpation    Hernias:  No hernias present  Lymphatic: Lymph Nodes:  No other lymphadenopathy present  Skin:  General Inspection:  No rashes present, no lesions present, no areas of  discoloration  Neurologic:    Mental Status:  Oriented X3.  Normal strength and tone, sensory exam                grossly normal, mentation intact and speech normal.    Psychiatric:   Mentation appears normal and affect normal/bright.         Pelvic Exam:  External Genitalia:     Normal appearance for age, no discharge present, no tenderness present, no inflammatory lesions present, color normal  Vagina:     Normal vaginal vault without central or paravaginal defects, no discharge present, no inflammatory lesions present, no masses " present  Bladder:     Nontender to palpation  Urethra:   Urethral Body:  Urethra palpation normal, urethra structural support normal   Urethral Meatus:  No erythema or lesions present  Cervix:     Appearance healthy, no lesions present, nontender to palpation, no bleeding present  Uterus:     Uterus: firm, normal sized and nontender, midplane in position.   Adnexa:     No adnexal tenderness present, no adnexal masses present  Perineum:     Perineum within normal limits, no evidence of trauma, no rashes or skin lesions present  Anus:     Anus within normal limits, no hemorrhoids present  Inguinal Lymph Nodes:     No lymphadenopathy present  Pubic Hair:     Normal pubic hair distribution for age  Genitalia and Groin:     No rashes present, no lesions present, no areas of discoloration, no masses present      COUNSELING:   Reviewed preventive health counseling, as reflected in patient instructions       Regular exercise       Healthy diet/nutrition       Osteoporosis prevention/bone health       Colorectal Cancer Screening       (Leigh Ann)menopause management    BMI: Body mass index is 29.31 kg/m .  Weight management plan: Discussed healthy diet and exercise guidelines    ASSESSMENT:  56 year old female with satisfactory annual exam.    ICD-10-CM    1. Encounter for gynecological examination without abnormal finding  Z01.419       2. Dysuria  R30.0       3. Screening for cervical cancer  Z12.4 Pap thin layer screen with HPV - recommended age 30 - 65 years      4. Screening for ischemic heart disease  Z13.6 Lipid Profile          PLAN:  Normal Gyn exam.  Pap Done.  Will notify patient with lab results when available.  Return prn or 1 year for annual.    CHRIS Crouch CNP   see neck section

## 2022-12-08 ENCOUNTER — APPOINTMENT (OUTPATIENT)
Dept: INTERNAL MEDICINE | Facility: CLINIC | Age: 67
End: 2022-12-08

## 2022-12-08 VITALS
SYSTOLIC BLOOD PRESSURE: 134 MMHG | BODY MASS INDEX: 35.99 KG/M2 | HEIGHT: 69 IN | DIASTOLIC BLOOD PRESSURE: 82 MMHG | HEART RATE: 60 BPM | WEIGHT: 243 LBS | RESPIRATION RATE: 14 BRPM | OXYGEN SATURATION: 96 % | TEMPERATURE: 97.7 F

## 2022-12-08 DIAGNOSIS — J06.9 ACUTE UPPER RESPIRATORY INFECTION, UNSPECIFIED: ICD-10-CM

## 2022-12-08 DIAGNOSIS — Z11.59 ENCOUNTER FOR SCREENING FOR OTHER VIRAL DISEASES: ICD-10-CM

## 2022-12-08 PROCEDURE — 99213 OFFICE O/P EST LOW 20 MIN: CPT

## 2022-12-09 ENCOUNTER — NON-APPOINTMENT (OUTPATIENT)
Age: 67
End: 2022-12-09

## 2022-12-09 LAB
INFLUENZA A RESULT: NOT DETECTED
INFLUENZA B RESULT: NOT DETECTED
RESP SYN VIRUS RESULT: NOT DETECTED
SARS-COV-2 RESULT: DETECTED

## 2022-12-09 NOTE — ASSESSMENT
[FreeTextEntry1] : Patient prescribed Omnicef 300 mg 1 twice daily #20, viral swab sent.Patient advised to rest/increase fluids and use supportive therapy. Patient will call if symptoms persist or worsen and return to the office as scheduled for regular followup.\par \par \par Dr. Alvares was present in office building while I examined patient\par

## 2022-12-09 NOTE — HISTORY OF PRESENT ILLNESS
[FreeTextEntry8] : Patient presents stating he had a cold about 2 weeks ago, felt better but then had a relapse on Monday.  Patient is complaining of cough that he feels in his chest with discolored sputum.  Patient denies fever/dyspnea/fever.  Patient did not take a home COVID test.

## 2022-12-09 NOTE — ADDENDUM
[FreeTextEntry1] : Viral swab positive for COVID\par Supportive therapy\par Report any negative symptoms\par Antiviral not given secondary to drug interactions

## 2022-12-09 NOTE — PHYSICAL EXAM
[No Acute Distress] : no acute distress [No Respiratory Distress] : no respiratory distress  [Normal Rate] : normal rate  [Regular Rhythm] : with a regular rhythm [Normal Affect] : the affect was normal [Normal Mood] : the mood was normal [Normal Outer Ear/Nose] : the outer ears and nose were normal in appearance [Normal Oropharynx] : the oropharynx was normal [Normal TMs] : both tympanic membranes were normal [Supple] : supple [Clear to Auscultation] : lungs were clear to auscultation bilaterally [de-identified] : crow roberts [de-identified] : + Cough noted

## 2022-12-12 ENCOUNTER — NON-APPOINTMENT (OUTPATIENT)
Age: 67
End: 2022-12-12

## 2022-12-19 ENCOUNTER — RX RENEWAL (OUTPATIENT)
Age: 67
End: 2022-12-19

## 2023-01-18 ENCOUNTER — APPOINTMENT (OUTPATIENT)
Dept: INTERNAL MEDICINE | Facility: CLINIC | Age: 68
End: 2023-01-18
Payer: MEDICARE

## 2023-01-18 VITALS
HEIGHT: 69 IN | SYSTOLIC BLOOD PRESSURE: 130 MMHG | HEART RATE: 97 BPM | BODY MASS INDEX: 36.73 KG/M2 | DIASTOLIC BLOOD PRESSURE: 80 MMHG | WEIGHT: 248 LBS | RESPIRATION RATE: 13 BRPM | OXYGEN SATURATION: 98 %

## 2023-01-18 PROCEDURE — 36415 COLL VENOUS BLD VENIPUNCTURE: CPT

## 2023-01-18 PROCEDURE — 99214 OFFICE O/P EST MOD 30 MIN: CPT | Mod: 25

## 2023-01-18 RX ORDER — CEFDINIR 300 MG/1
300 CAPSULE ORAL TWICE DAILY
Qty: 20 | Refills: 0 | Status: DISCONTINUED | COMMUNITY
Start: 2022-12-08 | End: 2023-01-18

## 2023-01-20 ENCOUNTER — NON-APPOINTMENT (OUTPATIENT)
Age: 68
End: 2023-01-20

## 2023-01-20 ENCOUNTER — TRANSCRIPTION ENCOUNTER (OUTPATIENT)
Age: 68
End: 2023-01-20

## 2023-01-20 LAB
ALBUMIN SERPL ELPH-MCNC: 4.1 G/DL
ALP BLD-CCNC: 101 U/L
ALT SERPL-CCNC: 44 U/L
ANION GAP SERPL CALC-SCNC: 12 MMOL/L
AST SERPL-CCNC: 34 U/L
BASOPHILS # BLD AUTO: 0.06 K/UL
BASOPHILS NFR BLD AUTO: 0.7 %
BILIRUB SERPL-MCNC: 0.4 MG/DL
BUN SERPL-MCNC: 24 MG/DL
CALCIUM SERPL-MCNC: 9.7 MG/DL
CHLORIDE SERPL-SCNC: 108 MMOL/L
CHOLEST SERPL-MCNC: 174 MG/DL
CO2 SERPL-SCNC: 24 MMOL/L
CREAT SERPL-MCNC: 1.91 MG/DL
EGFR: 38 ML/MIN/1.73M2
EOSINOPHIL # BLD AUTO: 0.09 K/UL
EOSINOPHIL NFR BLD AUTO: 1.1 %
ESTIMATED AVERAGE GLUCOSE: 166 MG/DL
GLUCOSE SERPL-MCNC: 119 MG/DL
HBA1C MFR BLD HPLC: 7.4 %
HCT VFR BLD CALC: 50.6 %
HDLC SERPL-MCNC: 45 MG/DL
HGB BLD-MCNC: 16.2 G/DL
IMM GRANULOCYTES NFR BLD AUTO: 0.9 %
LDLC SERPL CALC-MCNC: 99 MG/DL
LYMPHOCYTES # BLD AUTO: 1.35 K/UL
LYMPHOCYTES NFR BLD AUTO: 16.5 %
MAGNESIUM SERPL-MCNC: 1.9 MG/DL
MAN DIFF?: NORMAL
MCHC RBC-ENTMCNC: 28.2 PG
MCHC RBC-ENTMCNC: 32 GM/DL
MCV RBC AUTO: 88.2 FL
MONOCYTES # BLD AUTO: 0.69 K/UL
MONOCYTES NFR BLD AUTO: 8.5 %
NEUTROPHILS # BLD AUTO: 5.9 K/UL
NEUTROPHILS NFR BLD AUTO: 72.3 %
NONHDLC SERPL-MCNC: 129 MG/DL
PLATELET # BLD AUTO: 216 K/UL
POTASSIUM SERPL-SCNC: 4.9 MMOL/L
PROT SERPL-MCNC: 7.4 G/DL
RBC # BLD: 5.74 M/UL
RBC # FLD: 15.9 %
SODIUM SERPL-SCNC: 144 MMOL/L
T4 FREE SERPL-MCNC: 1.3 NG/DL
TRIGL SERPL-MCNC: 146 MG/DL
TSH SERPL-ACNC: 2.95 UIU/ML
WBC # FLD AUTO: 8.16 K/UL

## 2023-01-20 NOTE — ASSESSMENT
[FreeTextEntry1] : Venipuncture done in our office, Labs sent out.Patient advised to continue present medications with diet and exercise. Patient will follow up with specialists as scheduled. Patient will return to the office in 3months \par \par 24-hour urine was done 7/2021=follows with renal\par MA=9/2022 and sent out today per endocrinology request\par specialists include..\par 1. cardiology-Dr. Eckert\par 2. ophthalmology-Dr.Goldberg-8/2022\par 3. podiatry-NO LONGER GOES\par 4. apnea doctor-Dr. Live\par 5. gastro--Dr. Sanchez\par 6.-Dr. Michelle\par 7.Endocrine-Dr. Nino\par 8.renal-Dr. Gong\par colonoscopy 3/2018 with next in 5 years\par abdominal sonogram=1/2021=fatty liver/MRI abd 11/2021=[facility]\par discussed shingles/Prevnar vaccine//Covid vaccine rec but pt declines, declines flu vaccine\par carotid sonogram was 10/2020=no stenosis\par Echo  via cardio\par stress test 7/2021\par Hep C screen=12/2020\par thyroid sonogram via endocrinology 7/2021, followup per endo\par CT lung screening not applicable as patient quit greater than 30 years ago\par CT lung done on 11/2021= referral given

## 2023-01-20 NOTE — HISTORY OF PRESENT ILLNESS
[FreeTextEntry1] : Patient presents for followup on hypertension/hyperlipidemia/type 2 diabetes. Patient is currently on Cartia/Toprol/hydralazine for hypertension,On Crestor for hyperlipidemia and is on NovoLog/Lantus/glipizide/jardiance for type 2 diabetes\par -declines covid vaccine, had covid, recovered well\par -Needs blood work per endocrinology, see prescription\par \par \par \par \par \par  \par \par \par

## 2023-01-24 ENCOUNTER — APPOINTMENT (OUTPATIENT)
Dept: ENDOCRINOLOGY | Facility: CLINIC | Age: 68
End: 2023-01-24
Payer: MEDICARE

## 2023-01-24 VITALS
WEIGHT: 249 LBS | BODY MASS INDEX: 36.88 KG/M2 | HEART RATE: 67 BPM | TEMPERATURE: 97.5 F | HEIGHT: 69 IN | SYSTOLIC BLOOD PRESSURE: 114 MMHG | RESPIRATION RATE: 16 BRPM | OXYGEN SATURATION: 98 % | DIASTOLIC BLOOD PRESSURE: 72 MMHG

## 2023-01-24 PROCEDURE — 99214 OFFICE O/P EST MOD 30 MIN: CPT

## 2023-01-24 NOTE — PHYSICAL EXAM
[Alert] : alert [Well Nourished] : well nourished [Obese] : obese [No Acute Distress] : no acute distress [Well Developed] : well developed [Normal Sclera/Conjunctiva] : normal sclera/conjunctiva [EOMI] : extra ocular movement intact [No Proptosis] : no proptosis [Normal Oropharynx] : the oropharynx was normal [Thyroid Not Enlarged] : the thyroid was not enlarged [No Respiratory Distress] : no respiratory distress [No Accessory Muscle Use] : no accessory muscle use [Clear to Auscultation] : lungs were clear to auscultation bilaterally [Normal S1, S2] : normal S1 and S2 [Normal Rate] : heart rate was normal [Regular Rhythm] : with a regular rhythm [No Edema] : no peripheral edema [Pedal Pulses Normal] : the pedal pulses are present [Normal Bowel Sounds] : normal bowel sounds [Not Tender] : non-tender [Not Distended] : not distended [Soft] : abdomen soft [No Rash] : no rash [No Tremors] : no tremors [Oriented x3] : oriented to person, place, and time [Acanthosis Nigricans] : no acanthosis nigricans [de-identified] : thryodi nodularity B/L

## 2023-01-24 NOTE — ASSESSMENT
[FreeTextEntry1] : DM2 mildly uncontrolled in patient with nephropathy, HTN , HLD , CHENEY.  \par Now s/p renal carcinoma resection B/L. \par \par DM2 : a1c 7.4.  Gained weight after holidays . \par still skipping insulin doses\par continue for now glipizide XL 10 mg BID , jardiance 10 mg qd \par  cont tresiba 35 u HS and humalog 8-12-16 u TID w meals. \par nephropathy  CKD stage Iv : GFR stable. cont SGLt2  \par start walking 30 min per day \par Bgs 4 x day \par CHENEY : LFts normal .  \par \par HTN:   at target. continue management per cardiology\par I advised low fat/low cholesterol diet, low salt diet\par \par HLD:  lipids at target. continue crestor \par low fat/low cholesterol diet and weight loss advised\par \par obesity: gaining weight.   encouraged more exercise walking 30 min 3 x week  \par \par Goiter: FNA both nodules thyroid August 2021 benign. \par declines compressive sx  \par check neck US \par \par \par \par \par \par \par

## 2023-01-27 ENCOUNTER — OUTPATIENT (OUTPATIENT)
Dept: OUTPATIENT SERVICES | Facility: HOSPITAL | Age: 68
LOS: 1 days | End: 2023-01-27

## 2023-01-27 ENCOUNTER — APPOINTMENT (OUTPATIENT)
Dept: CT IMAGING | Facility: CLINIC | Age: 68
End: 2023-01-27
Payer: MEDICARE

## 2023-01-27 DIAGNOSIS — R91.8 OTHER NONSPECIFIC ABNORMAL FINDING OF LUNG FIELD: ICD-10-CM

## 2023-01-27 DIAGNOSIS — Z98.890 OTHER SPECIFIED POSTPROCEDURAL STATES: Chronic | ICD-10-CM

## 2023-01-27 DIAGNOSIS — I48.91 UNSPECIFIED ATRIAL FIBRILLATION: Chronic | ICD-10-CM

## 2023-01-27 DIAGNOSIS — Z90.5 ACQUIRED ABSENCE OF KIDNEY: Chronic | ICD-10-CM

## 2023-01-27 PROCEDURE — 71250 CT THORAX DX C-: CPT | Mod: 26

## 2023-02-01 ENCOUNTER — OUTPATIENT (OUTPATIENT)
Dept: OUTPATIENT SERVICES | Facility: HOSPITAL | Age: 68
LOS: 1 days | End: 2023-02-01

## 2023-02-01 ENCOUNTER — APPOINTMENT (OUTPATIENT)
Dept: ULTRASOUND IMAGING | Facility: CLINIC | Age: 68
End: 2023-02-01
Payer: MEDICARE

## 2023-02-01 DIAGNOSIS — Z98.890 OTHER SPECIFIED POSTPROCEDURAL STATES: Chronic | ICD-10-CM

## 2023-02-01 DIAGNOSIS — I48.91 UNSPECIFIED ATRIAL FIBRILLATION: Chronic | ICD-10-CM

## 2023-02-01 DIAGNOSIS — Z90.5 ACQUIRED ABSENCE OF KIDNEY: Chronic | ICD-10-CM

## 2023-02-01 DIAGNOSIS — E04.9 NONTOXIC GOITER, UNSPECIFIED: ICD-10-CM

## 2023-02-01 PROCEDURE — 76536 US EXAM OF HEAD AND NECK: CPT | Mod: 26

## 2023-02-02 ENCOUNTER — TRANSCRIPTION ENCOUNTER (OUTPATIENT)
Age: 68
End: 2023-02-02

## 2023-02-02 ENCOUNTER — NON-APPOINTMENT (OUTPATIENT)
Age: 68
End: 2023-02-02

## 2023-02-07 ENCOUNTER — NON-APPOINTMENT (OUTPATIENT)
Age: 68
End: 2023-02-07

## 2023-03-27 ENCOUNTER — NON-APPOINTMENT (OUTPATIENT)
Age: 68
End: 2023-03-27

## 2023-03-27 ENCOUNTER — APPOINTMENT (OUTPATIENT)
Dept: OPHTHALMOLOGY | Facility: CLINIC | Age: 68
End: 2023-03-27
Payer: MEDICARE

## 2023-03-27 PROCEDURE — 92012 INTRM OPH EXAM EST PATIENT: CPT

## 2023-04-03 ENCOUNTER — NON-APPOINTMENT (OUTPATIENT)
Age: 68
End: 2023-04-03

## 2023-04-25 ENCOUNTER — APPOINTMENT (OUTPATIENT)
Dept: INTERNAL MEDICINE | Facility: CLINIC | Age: 68
End: 2023-04-25
Payer: MEDICARE

## 2023-04-25 VITALS
WEIGHT: 240 LBS | DIASTOLIC BLOOD PRESSURE: 82 MMHG | RESPIRATION RATE: 15 BRPM | HEIGHT: 69 IN | HEART RATE: 74 BPM | BODY MASS INDEX: 35.55 KG/M2 | SYSTOLIC BLOOD PRESSURE: 130 MMHG

## 2023-04-25 PROCEDURE — 99214 OFFICE O/P EST MOD 30 MIN: CPT | Mod: 25

## 2023-04-25 PROCEDURE — 36415 COLL VENOUS BLD VENIPUNCTURE: CPT

## 2023-04-26 ENCOUNTER — TRANSCRIPTION ENCOUNTER (OUTPATIENT)
Age: 68
End: 2023-04-26

## 2023-04-26 ENCOUNTER — NON-APPOINTMENT (OUTPATIENT)
Age: 68
End: 2023-04-26

## 2023-04-26 LAB
ALBUMIN SERPL ELPH-MCNC: 4.2 G/DL
ALP BLD-CCNC: 110 U/L
ALT SERPL-CCNC: 51 U/L
ANION GAP SERPL CALC-SCNC: 12 MMOL/L
AST SERPL-CCNC: 39 U/L
BASOPHILS # BLD AUTO: 0.05 K/UL
BASOPHILS NFR BLD AUTO: 0.6 %
BILIRUB SERPL-MCNC: 0.4 MG/DL
BUN SERPL-MCNC: 22 MG/DL
CALCIUM SERPL-MCNC: 9.6 MG/DL
CHLORIDE SERPL-SCNC: 107 MMOL/L
CHOLEST SERPL-MCNC: 162 MG/DL
CO2 SERPL-SCNC: 23 MMOL/L
CREAT SERPL-MCNC: 1.74 MG/DL
EGFR: 43 ML/MIN/1.73M2
EOSINOPHIL # BLD AUTO: 0.11 K/UL
EOSINOPHIL NFR BLD AUTO: 1.4 %
ESTIMATED AVERAGE GLUCOSE: 171 MG/DL
GLUCOSE SERPL-MCNC: 118 MG/DL
HBA1C MFR BLD HPLC: 7.6 %
HCT VFR BLD CALC: 51.7 %
HDLC SERPL-MCNC: 40 MG/DL
HGB BLD-MCNC: 16.1 G/DL
IMM GRANULOCYTES NFR BLD AUTO: 1 %
LDLC SERPL CALC-MCNC: 87 MG/DL
LYMPHOCYTES # BLD AUTO: 1.42 K/UL
LYMPHOCYTES NFR BLD AUTO: 17.8 %
MAGNESIUM SERPL-MCNC: 2 MG/DL
MAN DIFF?: NORMAL
MCHC RBC-ENTMCNC: 28.3 PG
MCHC RBC-ENTMCNC: 31.1 GM/DL
MCV RBC AUTO: 91 FL
MONOCYTES # BLD AUTO: 0.85 K/UL
MONOCYTES NFR BLD AUTO: 10.7 %
NEUTROPHILS # BLD AUTO: 5.46 K/UL
NEUTROPHILS NFR BLD AUTO: 68.5 %
NONHDLC SERPL-MCNC: 122 MG/DL
PLATELET # BLD AUTO: 235 K/UL
POTASSIUM SERPL-SCNC: 4.8 MMOL/L
PROT SERPL-MCNC: 7.4 G/DL
RBC # BLD: 5.68 M/UL
RBC # FLD: 16.2 %
SODIUM SERPL-SCNC: 142 MMOL/L
T4 FREE SERPL-MCNC: 1.3 NG/DL
TRIGL SERPL-MCNC: 172 MG/DL
TSH SERPL-ACNC: 3.5 UIU/ML
WBC # FLD AUTO: 7.97 K/UL

## 2023-04-26 NOTE — ASSESSMENT
[FreeTextEntry1] : Venipuncture done in our office, Labs sent out.Patient advised to continue present medications with diet and exercise. Patient will follow up with specialists as scheduled. Patient will return to the office in 3months \par \par 24-hour urine was done 7/2021=follows with renal\par MA=9/2022\par specialists include..\par 1. cardiology-Dr. Eckert\par 2. ophthalmology-Dr.Goldberg-3/2023\par 3. podiatry-NO LONGER GOES\par 4. apnea doctor-Dr. Live\par 5. gastro--Dr. Sanchez\par 6.-Dr. Michelle "to do"\par 7.Endocrine-Dr. Nino\par 8.renal-Dr. Gong\par colonoscopy 3/2018 with next in 5 years "to do"= referral given\par abdominal sonogram=1/2021=fatty liver/MRI abd 11/2021=[facility]\par discussed shingles/Prevnar vaccine//Covid vaccine rec but pt declines, declines flu vaccine\par carotid sonogram was 10/2020=no stenosis\par Echo  via cardio\par Hep C screen=12/2020\par thyroid sonogram via endocrinology\par CT lung screening not applicable as patient quit greater than 30 years ago\par CT lung done on 1/2023=stable since 2019

## 2023-04-26 NOTE — HISTORY OF PRESENT ILLNESS
[FreeTextEntry1] : Patient presents for followup on hypertension/hyperlipidemia/type 2 diabetes. Patient is currently on Cartia/Toprol/hydralazine for hypertension,On Crestor for hyperlipidemia and is on NovoLog/Lantus/glipizide/jardiance for type 2 diabetes\par -declines covid vaccine\par \par \par \par \par \par \par  \par \par \par

## 2023-05-01 ENCOUNTER — NON-APPOINTMENT (OUTPATIENT)
Age: 68
End: 2023-05-01

## 2023-05-02 ENCOUNTER — LABORATORY RESULT (OUTPATIENT)
Age: 68
End: 2023-05-02

## 2023-05-03 ENCOUNTER — APPOINTMENT (OUTPATIENT)
Dept: ENDOCRINOLOGY | Facility: CLINIC | Age: 68
End: 2023-05-03
Payer: MEDICARE

## 2023-05-03 VITALS
OXYGEN SATURATION: 98 % | SYSTOLIC BLOOD PRESSURE: 130 MMHG | WEIGHT: 237 LBS | HEIGHT: 69 IN | BODY MASS INDEX: 35.1 KG/M2 | DIASTOLIC BLOOD PRESSURE: 80 MMHG | HEART RATE: 70 BPM

## 2023-05-03 PROCEDURE — 99214 OFFICE O/P EST MOD 30 MIN: CPT

## 2023-05-03 RX ORDER — PEN NEEDLE, DIABETIC 29 G X1/2"
32G X 4 MM NEEDLE, DISPOSABLE MISCELLANEOUS
Qty: 400 | Refills: 2 | Status: ACTIVE | COMMUNITY
Start: 2022-04-12 | End: 1900-01-01

## 2023-05-03 NOTE — ASSESSMENT
[FreeTextEntry1] : DM2 mildly uncontrolled in patient with nephropathy, HTN , HLD , CHENEY.  \par Now s/p renal carcinoma resection B/L. \par \par DM2 : a1c 7.3.  \par continue for now glipizide XL 10 mg BID , jardiance 10 mg qd \par cont tresiba 30 u HS and humalog 8-12-16 u TID w meals. \par nephropathy  CKD stage iv : GFR stable. cont SGLt2  \par will start ozmepic weekly. \par no h/o thyroid cancer or pancreatitis. discussed administrations and side effects \par advsied as ozempic kicks in decrease gradually insulins. \par discussed diet and exercise. \par Bgs 4 x day \par CHENEY : contineu to monitor.   \par \par HTN:   at target. continue management per cardiology\par \par HLD:  lipids at target. continue crestor \par \par obesity: lost 12 lbs.  encouraged more exercise walking 30 min 3 x week  \par \par Goiter: FNA both nodules thyroid August 2021 benign. \par declines compressive sx  \par Us Feb 2023 : stable nodules. REpeat US in [facility] 2024 \par \par \par \par \par \par \par

## 2023-05-03 NOTE — PHYSICAL EXAM
[Alert] : alert [Well Nourished] : well nourished [Obese] : obese [No Acute Distress] : no acute distress [Well Developed] : well developed [Normal Sclera/Conjunctiva] : normal sclera/conjunctiva [EOMI] : extra ocular movement intact [No Proptosis] : no proptosis [Normal Oropharynx] : the oropharynx was normal [Thyroid Not Enlarged] : the thyroid was not enlarged [No Respiratory Distress] : no respiratory distress [No Accessory Muscle Use] : no accessory muscle use [Clear to Auscultation] : lungs were clear to auscultation bilaterally [Normal S1, S2] : normal S1 and S2 [Normal Rate] : heart rate was normal [Regular Rhythm] : with a regular rhythm [No Edema] : no peripheral edema [Pedal Pulses Normal] : the pedal pulses are present [Normal Bowel Sounds] : normal bowel sounds [Not Tender] : non-tender [Not Distended] : not distended [Soft] : abdomen soft [No Rash] : no rash [No Tremors] : no tremors [Oriented x3] : oriented to person, place, and time [Acanthosis Nigricans] : no acanthosis nigricans [de-identified] : thryodi nodularity B/L

## 2023-05-31 ENCOUNTER — APPOINTMENT (OUTPATIENT)
Dept: INTERNAL MEDICINE | Facility: CLINIC | Age: 68
End: 2023-05-31
Payer: MEDICARE

## 2023-05-31 VITALS
RESPIRATION RATE: 14 BRPM | DIASTOLIC BLOOD PRESSURE: 82 MMHG | HEART RATE: 71 BPM | BODY MASS INDEX: 35.55 KG/M2 | OXYGEN SATURATION: 98 % | SYSTOLIC BLOOD PRESSURE: 130 MMHG | WEIGHT: 240 LBS | HEIGHT: 69 IN

## 2023-05-31 PROCEDURE — 36415 COLL VENOUS BLD VENIPUNCTURE: CPT

## 2023-05-31 PROCEDURE — 99213 OFFICE O/P EST LOW 20 MIN: CPT | Mod: 25

## 2023-05-31 RX ORDER — SEMAGLUTIDE 0.68 MG/ML
2 INJECTION, SOLUTION SUBCUTANEOUS
Qty: 3 | Refills: 1 | Status: DISCONTINUED | COMMUNITY
Start: 2023-05-03 | End: 2023-05-31

## 2023-06-01 ENCOUNTER — TRANSCRIPTION ENCOUNTER (OUTPATIENT)
Age: 68
End: 2023-06-01

## 2023-06-01 LAB
ALBUMIN SERPL ELPH-MCNC: 4.1 G/DL
ALP BLD-CCNC: 115 U/L
ALT SERPL-CCNC: 53 U/L
AST SERPL-CCNC: 41 U/L
BILIRUB DIRECT SERPL-MCNC: 0.1 MG/DL
BILIRUB INDIRECT SERPL-MCNC: 0.2 MG/DL
BILIRUB SERPL-MCNC: 0.3 MG/DL
PROT SERPL-MCNC: 7.1 G/DL

## 2023-06-01 NOTE — ASSESSMENT
[FreeTextEntry1] : Venipuncture done in our office, Labs sent out.Patient advised to continue present medications with diet and exercise. Patient will follow up with specialists as scheduled. Patient will return to the office as sched for reg check\par \par 24-hour urine was done 7/2021=follows with renal\par MA=9/2022\par specialists include..\par 1. cardiology-Dr. Eckert\par 2. ophthalmology-Dr.Goldberg-3/2023\par 3. podiatry-NO LONGER GOES\par 4. apnea doctor-Dr. Live\par 5. gastro--Dr. Sanchez\par 6.-Dr. Michelle "to do"\par 7.Endocrine-Dr. Nino\par 8.renal-Dr. Gong\par colonoscopy 3/2018 with next in 5 years "to do"= referral given\par abdominal sonogram=1/2021=fatty liver/MRI abd 11/2021=[facility]\par discussed shingles/Prevnar vaccine//Covid vaccine rec but pt declines, declines flu vaccine\par carotid sonogram was 10/2020=no stenosis\par Echo  via cardio=4/2023\par Hep C screen=12/2020\par thyroid sonogram via endocrinology\par CT lung screening not applicable as patient quit greater than 30 years ago\par CT lung done on 1/2023=stable since 2019

## 2023-06-01 NOTE — HISTORY OF PRESENT ILLNESS
[FreeTextEntry1] : Patient presents for followup\par - Patient is currently on Cartia/Toprol/hydralazine for hypertension\par -Last blood work showed ALT elevated at 51 with known fatty liver, abnormal results discussed with patient and questions answered\par -declines covid vaccine\par \par \par \par \par \par \par  \par \par \par

## 2023-06-12 ENCOUNTER — APPOINTMENT (OUTPATIENT)
Dept: ULTRASOUND IMAGING | Facility: CLINIC | Age: 68
End: 2023-06-12
Payer: MEDICARE

## 2023-06-12 ENCOUNTER — NON-APPOINTMENT (OUTPATIENT)
Age: 68
End: 2023-06-12

## 2023-06-12 ENCOUNTER — OUTPATIENT (OUTPATIENT)
Dept: OUTPATIENT SERVICES | Facility: HOSPITAL | Age: 68
LOS: 1 days | End: 2023-06-12

## 2023-06-12 DIAGNOSIS — Z98.890 OTHER SPECIFIED POSTPROCEDURAL STATES: Chronic | ICD-10-CM

## 2023-06-12 DIAGNOSIS — K76.0 FATTY (CHANGE OF) LIVER, NOT ELSEWHERE CLASSIFIED: ICD-10-CM

## 2023-06-12 DIAGNOSIS — I48.91 UNSPECIFIED ATRIAL FIBRILLATION: Chronic | ICD-10-CM

## 2023-06-12 DIAGNOSIS — Z90.5 ACQUIRED ABSENCE OF KIDNEY: Chronic | ICD-10-CM

## 2023-06-12 PROCEDURE — 76700 US EXAM ABDOM COMPLETE: CPT | Mod: 26

## 2023-07-28 ENCOUNTER — APPOINTMENT (OUTPATIENT)
Dept: INTERNAL MEDICINE | Facility: CLINIC | Age: 68
End: 2023-07-28
Payer: MEDICARE

## 2023-07-28 VITALS
SYSTOLIC BLOOD PRESSURE: 130 MMHG | BODY MASS INDEX: 35.55 KG/M2 | HEIGHT: 69 IN | WEIGHT: 240 LBS | HEART RATE: 66 BPM | RESPIRATION RATE: 15 BRPM | DIASTOLIC BLOOD PRESSURE: 84 MMHG

## 2023-07-28 DIAGNOSIS — J06.9 ACUTE UPPER RESPIRATORY INFECTION, UNSPECIFIED: ICD-10-CM

## 2023-07-28 PROCEDURE — 36415 COLL VENOUS BLD VENIPUNCTURE: CPT

## 2023-07-28 PROCEDURE — 99214 OFFICE O/P EST MOD 30 MIN: CPT | Mod: 25

## 2023-07-30 LAB
ALBUMIN SERPL ELPH-MCNC: 4.2 G/DL
ALP BLD-CCNC: 114 U/L
ALT SERPL-CCNC: 43 U/L
ANION GAP SERPL CALC-SCNC: 14 MMOL/L
AST SERPL-CCNC: 30 U/L
BILIRUB SERPL-MCNC: 0.2 MG/DL
BUN SERPL-MCNC: 26 MG/DL
CALCIUM SERPL-MCNC: 9.4 MG/DL
CHLORIDE SERPL-SCNC: 107 MMOL/L
CHOLEST SERPL-MCNC: 171 MG/DL
CO2 SERPL-SCNC: 21 MMOL/L
CREAT SERPL-MCNC: 1.83 MG/DL
EGFR: 40 ML/MIN/1.73M2
ESTIMATED AVERAGE GLUCOSE: 160 MG/DL
GLUCOSE SERPL-MCNC: 158 MG/DL
HBA1C MFR BLD HPLC: 7.2 %
HDLC SERPL-MCNC: 41 MG/DL
LDLC SERPL CALC-MCNC: 101 MG/DL
MAGNESIUM SERPL-MCNC: 2.2 MG/DL
NONHDLC SERPL-MCNC: 130 MG/DL
POTASSIUM SERPL-SCNC: 4.6 MMOL/L
PROT SERPL-MCNC: 7.2 G/DL
SODIUM SERPL-SCNC: 141 MMOL/L
TRIGL SERPL-MCNC: 165 MG/DL
TSH SERPL-ACNC: 2.45 UIU/ML

## 2023-07-30 NOTE — HISTORY OF PRESENT ILLNESS
[FreeTextEntry1] : Patient presents for followup on hypertension/hyperlipidemia/type 2 diabetes. Patient is currently on Cartia/Toprol/hydralazine for hypertension,On Crestor for hyperlipidemia and is on NovoLog/Lantus/glipizide/jardiance/Ozempic for type 2 diabetes\par -declines covid vaccine\par -Patient c/o cough for 1 week " in chest", patient states cough is productive, no dyspnea but has heard rattling.  Patient took 2 COVID test which were negative.  Patient denies fever\par \par \par \par \par \par \par  \par \par \par

## 2023-07-30 NOTE — ASSESSMENT
[FreeTextEntry1] : Doxycycline given.  Patient to continue Mucinex, discussed Medrol Dosepak which we will add if patient feels he needs. Venipuncture done in our office, Labs sent out.Patient advised to continue present medications with diet and exercise. Patient will follow up with specialists as scheduled. Patient will return to the office in 3-4months for CP\par \par Attending MD available by phone if needed\par \par 24-hour urine was done 7/2021=follows with renal\par MA=9/2022\par specialists include..\par 1. cardiology-Dr. Eckert\par 2. ophthalmology-Dr.Goldberg-3/2023\par 3. podiatry-NO LONGER GOES\par 4. apnea doctor-Dr. Live\par 5. gastro--Dr. Sanchez\par 6.-Dr. Michelle "to do"\par 7.Endocrine-Dr. Nino\par 8.renal-Dr. Gong\par colonoscopy 3/2018 with next in 5 years "to do"\par abdominal sonogram=6/2023=fatty liver\par discussed shingles/Prevnar/TDAP vaccine//Covid vaccine rec but pt declines, declines flu vaccine\par carotid sonogram was 10/2020=no stenosis\par Echo  via cardio=4/2023\par Hep C screen=12/2020\par thyroid sonogram via endocrinology\par CT lung screening not applicable as patient quit greater than 30 years ago\par CT lung done on 1/2023=stable since 2019

## 2023-09-12 ENCOUNTER — APPOINTMENT (OUTPATIENT)
Dept: INTERNAL MEDICINE | Facility: CLINIC | Age: 68
End: 2023-09-12

## 2023-10-06 ENCOUNTER — TRANSCRIPTION ENCOUNTER (OUTPATIENT)
Age: 68
End: 2023-10-06

## 2023-10-16 ENCOUNTER — APPOINTMENT (OUTPATIENT)
Dept: INTERNAL MEDICINE | Facility: CLINIC | Age: 68
End: 2023-10-16
Payer: MEDICARE

## 2023-10-16 VITALS
HEIGHT: 69 IN | SYSTOLIC BLOOD PRESSURE: 150 MMHG | DIASTOLIC BLOOD PRESSURE: 94 MMHG | BODY MASS INDEX: 35.55 KG/M2 | RESPIRATION RATE: 14 BRPM | WEIGHT: 240 LBS | HEART RATE: 58 BPM

## 2023-10-16 DIAGNOSIS — I42.8 OTHER CARDIOMYOPATHIES: ICD-10-CM

## 2023-10-16 DIAGNOSIS — E55.9 VITAMIN D DEFICIENCY, UNSPECIFIED: ICD-10-CM

## 2023-10-16 DIAGNOSIS — N18.32 CHRONIC KIDNEY DISEASE, STAGE 3B: ICD-10-CM

## 2023-10-16 DIAGNOSIS — Z12.5 ENCOUNTER FOR SCREENING FOR MALIGNANT NEOPLASM OF PROSTATE: ICD-10-CM

## 2023-10-16 DIAGNOSIS — Z00.00 ENCOUNTER FOR GENERAL ADULT MEDICAL EXAMINATION W/OUT ABNORMAL FINDINGS: ICD-10-CM

## 2023-10-16 DIAGNOSIS — R79.89 OTHER SPECIFIED ABNORMAL FINDINGS OF BLOOD CHEMISTRY: ICD-10-CM

## 2023-10-16 DIAGNOSIS — Z79.01 LONG TERM (CURRENT) USE OF ANTICOAGULANTS: ICD-10-CM

## 2023-10-16 PROCEDURE — G0439: CPT

## 2023-10-16 PROCEDURE — 36415 COLL VENOUS BLD VENIPUNCTURE: CPT

## 2023-10-16 RX ORDER — DOXYCYCLINE 100 MG/1
100 TABLET, FILM COATED ORAL
Qty: 20 | Refills: 0 | Status: DISCONTINUED | COMMUNITY
Start: 2023-07-28 | End: 2023-10-16

## 2023-10-17 LAB
25(OH)D3 SERPL-MCNC: 33.7 NG/ML
ALBUMIN SERPL ELPH-MCNC: 4.2 G/DL
ALP BLD-CCNC: 111 U/L
ALT SERPL-CCNC: 45 U/L
ANION GAP SERPL CALC-SCNC: 13 MMOL/L
APPEARANCE: CLEAR
AST SERPL-CCNC: 29 U/L
BACTERIA: NEGATIVE /HPF
BASOPHILS # BLD AUTO: 0.06 K/UL
BASOPHILS NFR BLD AUTO: 0.6 %
BILIRUB SERPL-MCNC: 0.4 MG/DL
BILIRUBIN URINE: NEGATIVE
BLOOD URINE: NEGATIVE
BUN SERPL-MCNC: 26 MG/DL
CALCIUM SERPL-MCNC: 9.2 MG/DL
CAST: 1 /LPF
CHLORIDE SERPL-SCNC: 107 MMOL/L
CHOLEST SERPL-MCNC: 164 MG/DL
CO2 SERPL-SCNC: 22 MMOL/L
COLOR: YELLOW
CREAT SERPL-MCNC: 2.07 MG/DL
CREAT SPEC-SCNC: 74 MG/DL
EGFR: 34 ML/MIN/1.73M2
EOSINOPHIL # BLD AUTO: 0.13 K/UL
EOSINOPHIL NFR BLD AUTO: 1.3 %
EPITHELIAL CELLS: 0 /HPF
ESTIMATED AVERAGE GLUCOSE: 194 MG/DL
GLUCOSE QUALITATIVE U: >=1000 MG/DL
GLUCOSE SERPL-MCNC: 206 MG/DL
HBA1C MFR BLD HPLC: 8.4 %
HCT VFR BLD CALC: 50.3 %
HDLC SERPL-MCNC: 45 MG/DL
HGB BLD-MCNC: 16.1 G/DL
IMM GRANULOCYTES NFR BLD AUTO: 0.7 %
KETONES URINE: NEGATIVE MG/DL
LDLC SERPL CALC-MCNC: 93 MG/DL
LEUKOCYTE ESTERASE URINE: NEGATIVE
LYMPHOCYTES # BLD AUTO: 1.72 K/UL
LYMPHOCYTES NFR BLD AUTO: 17.1 %
MAN DIFF?: NORMAL
MCHC RBC-ENTMCNC: 27.6 PG
MCHC RBC-ENTMCNC: 32 GM/DL
MCV RBC AUTO: 86.3 FL
MICROALBUMIN 24H UR DL<=1MG/L-MCNC: 61.3 MG/DL
MICROALBUMIN/CREAT 24H UR-RTO: 826 MG/G
MICROSCOPIC-UA: NORMAL
MONOCYTES # BLD AUTO: 0.87 K/UL
MONOCYTES NFR BLD AUTO: 8.6 %
NEUTROPHILS # BLD AUTO: 7.21 K/UL
NEUTROPHILS NFR BLD AUTO: 71.7 %
NITRITE URINE: NEGATIVE
NONHDLC SERPL-MCNC: 119 MG/DL
PH URINE: 5.5
PLATELET # BLD AUTO: 208 K/UL
POTASSIUM SERPL-SCNC: 4.4 MMOL/L
PROT SERPL-MCNC: 7.5 G/DL
PROTEIN URINE: 100 MG/DL
PSA SERPL-MCNC: 1.05 NG/ML
RBC # BLD: 5.83 M/UL
RBC # FLD: 16.9 %
RED BLOOD CELLS URINE: 0 /HPF
SODIUM SERPL-SCNC: 142 MMOL/L
SPECIFIC GRAVITY URINE: 1.03
T4 FREE SERPL-MCNC: 1.3 NG/DL
TRIGL SERPL-MCNC: 148 MG/DL
TSH SERPL-ACNC: 2.66 UIU/ML
UROBILINOGEN URINE: 0.2 MG/DL
WBC # FLD AUTO: 10.06 K/UL
WHITE BLOOD CELLS URINE: 1 /HPF

## 2023-10-18 ENCOUNTER — APPOINTMENT (OUTPATIENT)
Dept: ENDOCRINOLOGY | Facility: CLINIC | Age: 68
End: 2023-10-18
Payer: MEDICARE

## 2023-10-18 VITALS
RESPIRATION RATE: 14 BRPM | OXYGEN SATURATION: 97 % | BODY MASS INDEX: 35.44 KG/M2 | TEMPERATURE: 97 F | HEART RATE: 71 BPM | DIASTOLIC BLOOD PRESSURE: 80 MMHG | HEIGHT: 70 IN | WEIGHT: 247.56 LBS | SYSTOLIC BLOOD PRESSURE: 130 MMHG

## 2023-10-18 DIAGNOSIS — E04.9 NONTOXIC GOITER, UNSPECIFIED: ICD-10-CM

## 2023-10-18 DIAGNOSIS — E11.65 TYPE 2 DIABETES MELLITUS WITH HYPERGLYCEMIA: ICD-10-CM

## 2023-10-18 PROCEDURE — 99214 OFFICE O/P EST MOD 30 MIN: CPT

## 2023-10-18 RX ORDER — ERGOCALCIFEROL 1.25 MG/1
1.25 MG CAPSULE, LIQUID FILLED ORAL
Qty: 12 | Refills: 3 | Status: ACTIVE | COMMUNITY
Start: 2022-03-26 | End: 1900-01-01

## 2023-10-18 RX ORDER — INSULIN ASPART 100 [IU]/ML
100 INJECTION, SOLUTION INTRAVENOUS; SUBCUTANEOUS
Qty: 3 | Refills: 1 | Status: ACTIVE | COMMUNITY
Start: 2019-04-17 | End: 1900-01-01

## 2023-10-18 RX ORDER — SEMAGLUTIDE 0.68 MG/ML
2 INJECTION, SOLUTION SUBCUTANEOUS
Qty: 1 | Refills: 2 | Status: DISCONTINUED | COMMUNITY
Start: 2023-06-05 | End: 2023-10-18

## 2023-10-18 RX ORDER — BLOOD-GLUCOSE METER
W/DEVICE EACH MISCELLANEOUS
Qty: 1 | Refills: 0 | Status: DISCONTINUED | COMMUNITY
Start: 2022-04-12 | End: 2023-10-18

## 2023-11-19 LAB
BSA DERIVED: 1.73 M2
CREAT 24H UR-MCNC: 1.4 G/24 H
CREAT 24H UR-MCNC: 1.4 G/24 H
CREAT ?TM UR-MCNC: 50 MG/DL
CREAT ?TM UR-MCNC: 50 MG/DL
CREAT CL 24H UR+SERPL-VRATE: 51 ML/MIN
PROT 24H UR-MRATE: 72 MG/DL
PROT ?TM UR-MCNC: 24 HR
PROT ?TM UR-MCNC: 24 HR
PROT UR-MCNC: 1944 MG/24 H
SPECIMEN VOL 24H UR: 2700 ML
SPECIMEN VOL 24H UR: 2700 ML

## 2023-11-20 ENCOUNTER — TRANSCRIPTION ENCOUNTER (OUTPATIENT)
Age: 68
End: 2023-11-20

## 2023-11-22 ENCOUNTER — APPOINTMENT (OUTPATIENT)
Dept: NEPHROLOGY | Facility: CLINIC | Age: 68
End: 2023-11-22
Payer: MEDICARE

## 2023-11-22 VITALS
OXYGEN SATURATION: 97 % | HEART RATE: 79 BPM | WEIGHT: 240 LBS | DIASTOLIC BLOOD PRESSURE: 108 MMHG | HEIGHT: 70 IN | SYSTOLIC BLOOD PRESSURE: 152 MMHG | BODY MASS INDEX: 34.36 KG/M2

## 2023-11-22 PROCEDURE — 99214 OFFICE O/P EST MOD 30 MIN: CPT

## 2024-01-24 ENCOUNTER — APPOINTMENT (OUTPATIENT)
Dept: ENDOCRINOLOGY | Facility: CLINIC | Age: 69
End: 2024-01-24
Payer: MEDICARE

## 2024-01-24 VITALS
HEART RATE: 62 BPM | HEIGHT: 70 IN | BODY MASS INDEX: 35.79 KG/M2 | OXYGEN SATURATION: 97 % | DIASTOLIC BLOOD PRESSURE: 86 MMHG | WEIGHT: 250 LBS | SYSTOLIC BLOOD PRESSURE: 140 MMHG

## 2024-01-24 DIAGNOSIS — R79.89 OTHER SPECIFIED ABNORMAL FINDINGS OF BLOOD CHEMISTRY: ICD-10-CM

## 2024-01-24 LAB
ALBUMIN SERPL ELPH-MCNC: 4.2 G/DL
ALP BLD-CCNC: 110 U/L
ALT SERPL-CCNC: 47 U/L
ANION GAP SERPL CALC-SCNC: 12 MMOL/L
AST SERPL-CCNC: 33 U/L
BILIRUB SERPL-MCNC: 0.3 MG/DL
BUN SERPL-MCNC: 28 MG/DL
CALCIUM SERPL-MCNC: 9.2 MG/DL
CHLORIDE SERPL-SCNC: 106 MMOL/L
CHOLEST SERPL-MCNC: 164 MG/DL
CO2 SERPL-SCNC: 24 MMOL/L
CREAT SERPL-MCNC: 1.9 MG/DL
CREAT SPEC-SCNC: 55 MG/DL
EGFR: 38 ML/MIN/1.73M2
ESTIMATED AVERAGE GLUCOSE: 183 MG/DL
GLUCOSE SERPL-MCNC: 190 MG/DL
HBA1C MFR BLD HPLC: 8 %
HCT VFR BLD CALC: 49.2 %
HDLC SERPL-MCNC: 40 MG/DL
HGB BLD-MCNC: 15.6 G/DL
LDLC SERPL CALC-MCNC: 91 MG/DL
MCHC RBC-ENTMCNC: 27.9 PG
MCHC RBC-ENTMCNC: 31.7 GM/DL
MCV RBC AUTO: 87.9 FL
MICROALBUMIN 24H UR DL<=1MG/L-MCNC: 34.3 MG/DL
MICROALBUMIN/CREAT 24H UR-RTO: 621 MG/G
NONHDLC SERPL-MCNC: 125 MG/DL
PLATELET # BLD AUTO: 221 K/UL
POTASSIUM SERPL-SCNC: 4.2 MMOL/L
PROT SERPL-MCNC: 7.2 G/DL
RBC # BLD: 5.6 M/UL
RBC # FLD: 16.8 %
SODIUM SERPL-SCNC: 142 MMOL/L
T4 FREE SERPL-MCNC: 1.4 NG/DL
TRIGL SERPL-MCNC: 196 MG/DL
TSH SERPL-ACNC: 3 UIU/ML
WBC # FLD AUTO: 10.33 K/UL

## 2024-01-24 PROCEDURE — 99214 OFFICE O/P EST MOD 30 MIN: CPT

## 2024-01-24 NOTE — PHYSICAL EXAM
[Well Nourished] : well nourished [Alert] : alert [No Acute Distress] : no acute distress [Obese] : obese [Well Developed] : well developed [Normal Sclera/Conjunctiva] : normal sclera/conjunctiva [EOMI] : extra ocular movement intact [No Proptosis] : no proptosis [Normal Oropharynx] : the oropharynx was normal [Thyroid Not Enlarged] : the thyroid was not enlarged [No Respiratory Distress] : no respiratory distress [No Accessory Muscle Use] : no accessory muscle use [Clear to Auscultation] : lungs were clear to auscultation bilaterally [Normal S1, S2] : normal S1 and S2 [Normal Rate] : heart rate was normal Total Pulses: 150 [Regular Rhythm] : with a regular rhythm [No Edema] : no peripheral edema [Pedal Pulses Normal] : the pedal pulses are present [Normal Bowel Sounds] : normal bowel sounds [Not Tender] : non-tender [Not Distended] : not distended [Soft] : abdomen soft [No Rash] : no rash [No Tremors] : no tremors [Oriented x3] : oriented to person, place, and time [Acanthosis Nigricans] : no acanthosis nigricans [de-identified] : thryodi nodularity B/L

## 2024-01-24 NOTE — ASSESSMENT
[Carbohydrate Consistent Diet] : carbohydrate consistent diet [Exercise/Effect on Glucose] : exercise/effect on glucose [Self Monitoring of Blood Glucose] : self monitoring of blood glucose [Retinopathy Screening] : Patient was referred to ophthalmology for retinopathy screening [Weight Loss] : weight loss [FreeTextEntry1] : DM2 mildly uncontrolled in patient with nephropathy, HTN , HLD , CHENEY. Now s/p renal carcinoma resection B/L.  DM2 : a1c 8.. admits bad diet habits  Gained 10 lbs.  continue for now glipizide XL 10 mg BID ,  jardiance 10 mg qd increase tresiba to 33 u HS  increase  humalog 11-15-19 u TDI w meals.  nephropathy CKD stage iv : GFR stable. cont SGLt2 cannot afford glp1 discussed diet and exercise. Bgs 4 x day CHENEY : high LFts. Contineu to monitor. eye exam utd   HTN: at target. continue management per cardiology.  HLD: lipids at target. continue crestor  Obesity: encouraged more exercise walking 30 min 3 x week  Goiter: FNA both nodules thyroid August 2021 benign. declines compressive sx  Us Feb 2023 : stable nodules. REpeat US next appt.

## 2024-01-29 ENCOUNTER — APPOINTMENT (OUTPATIENT)
Dept: PULMONOLOGY | Facility: CLINIC | Age: 69
End: 2024-01-29

## 2024-02-07 ENCOUNTER — APPOINTMENT (OUTPATIENT)
Dept: PULMONOLOGY | Facility: CLINIC | Age: 69
End: 2024-02-07
Payer: MEDICARE

## 2024-02-07 ENCOUNTER — APPOINTMENT (OUTPATIENT)
Dept: INTERNAL MEDICINE | Facility: CLINIC | Age: 69
End: 2024-02-07
Payer: MEDICARE

## 2024-02-07 VITALS
HEART RATE: 69 BPM | RESPIRATION RATE: 13 BRPM | HEIGHT: 70 IN | DIASTOLIC BLOOD PRESSURE: 82 MMHG | BODY MASS INDEX: 35.5 KG/M2 | WEIGHT: 248 LBS | SYSTOLIC BLOOD PRESSURE: 130 MMHG | OXYGEN SATURATION: 98 %

## 2024-02-07 VITALS — WEIGHT: 245 LBS | BODY MASS INDEX: 35.15 KG/M2

## 2024-02-07 VITALS
WEIGHT: 250 LBS | BODY MASS INDEX: 35.79 KG/M2 | HEART RATE: 74 BPM | DIASTOLIC BLOOD PRESSURE: 80 MMHG | SYSTOLIC BLOOD PRESSURE: 142 MMHG | RESPIRATION RATE: 16 BRPM | OXYGEN SATURATION: 98 % | HEIGHT: 70 IN

## 2024-02-07 DIAGNOSIS — K76.0 FATTY (CHANGE OF) LIVER, NOT ELSEWHERE CLASSIFIED: ICD-10-CM

## 2024-02-07 DIAGNOSIS — G47.33 OBSTRUCTIVE SLEEP APNEA (ADULT) (PEDIATRIC): ICD-10-CM

## 2024-02-07 PROCEDURE — 99214 OFFICE O/P EST MOD 30 MIN: CPT

## 2024-02-07 PROCEDURE — 99213 OFFICE O/P EST LOW 20 MIN: CPT

## 2024-02-07 PROCEDURE — 36415 COLL VENOUS BLD VENIPUNCTURE: CPT

## 2024-02-07 PROCEDURE — G2211 COMPLEX E/M VISIT ADD ON: CPT

## 2024-02-07 RX ORDER — HYDRALAZINE HYDROCHLORIDE 50 MG/1
50 TABLET ORAL EVERY 8 HOURS
Qty: 270 | Refills: 1 | Status: ACTIVE | COMMUNITY
Start: 2021-11-04

## 2024-02-07 NOTE — HISTORY OF PRESENT ILLNESS
[FreeTextEntry1] : 63 yo male with moderate to severe vaishali maintained on nocturnal nasal auto titrating cpap at 7-17 cm H20 Excellent compliance (>8 hrs/night). AHI on cpap=1. No xs leak  Partial nephrectomy for cancer at [facility] in April CT in February of 2018 with stable small L effusion and stable tiny intrapulmonic LNs  Cardiomyopathy managed by Dr Morales Doing well with cpap  6/18/20 Loves AutoPap Needs medium P10 mask, heated tubing and headgear - hasn't received though ordered   12/28/20 No new supplies since 2018  6/23/21 Getting supplies Sleeping well Semi retired  12/29/21 Sleeping well with APAP CARVAJAL in October - responded to cardiac medication adjustment  CT Chest 11/29/21: Not suggestive of Amiodarone toxicity   2/7/24 APAP message - Exceeded motor life Compliant with and benefiting from nocturnal CPAP

## 2024-02-07 NOTE — DISCUSSION/SUMMARY
[FreeTextEntry1] : Moderate to severe MARIANA well controlled on nocturnal AutoPap Compliant with and benefitting from nocturnal AutoPap APAP machine has exceeded its motor life. New APAP via medium P10 pillows ordered  4 month FU    Stable inconsequential Left pleural effusion and tiny intrapulmonic LNs CARVAJAL responded to cardiac medication adjustment - CT not c/w amiodarone toxicity - Spirometry is also normal and against Amiodarone lung toxicity  Thyroid nodule address Some mild cold sensitivity - ordering albuterol MDI for prn use

## 2024-02-07 NOTE — PHYSICAL EXAM
[Elongated Uvula] : elongated uvula [Enlarged Base of the Tongue] : enlargement of the base of the tongue [II] : II [Neck Appearance] : the appearance of the neck was normal [] : the neck was supple [Neck Cervical Mass (___cm)] : no neck mass was observed [Jugular Venous Distention Increased] : there was no jugular-venous distention [Thyroid Diffuse Enlargement] : the thyroid was not enlarged [Neck Circumference: ___] : neck circumference is [unfilled] [Heart Sounds] : normal S1 and S2 [Arterial Pulses Normal] : the arterial pulses were normal [Edema] : no peripheral edema present [Respiration, Rhythm And Depth] : normal respiratory rhythm and effort [Auscultation Breath Sounds / Voice Sounds] : lungs were clear to auscultation bilaterally [Lungs Percussion] : the lungs were normal to percussion [Nail Clubbing] : no clubbing of the fingernails [Cyanosis, Localized] : no localized cyanosis [FreeTextEntry1] : obese

## 2024-02-07 NOTE — HISTORY OF PRESENT ILLNESS
[FreeTextEntry1] : Patient presents for followup on hypertension/hyperlipidemia/type 2 diabetes. Patient is currently on Cartia/Toprol/hydralazine for hypertension,On Crestor for hyperlipidemia and is on NovoLog/Lantus/glipizide/jardiance for type 2 diabetes -declines covid vaccine -cardio increased hydralazine to 50mg TID

## 2024-02-07 NOTE — PHYSICAL EXAM
[General Appearance - In No Acute Distress] : in no acute distress [Affect] : the affect was normal [Mood] : the mood was normal [Respiration, Rhythm And Depth] : normal respiratory rhythm and effort [Auscultation Breath Sounds / Voice Sounds] : lungs were clear to auscultation bilaterally [FreeTextEntry1] : + irreg with CVR

## 2024-02-07 NOTE — ASSESSMENT
[FreeTextEntry1] : Venipuncture done in our office, Labs sent out.Patient advised to continue present medications with diet and exercise. Patient will follow up with specialists as scheduled. Patient will return to the office in 3-4months  24-hour urine was done 11/2023=follows with renal MA=1/2024 specialists include.. 1. cardiology-Dr. Eckert 2. ophthalmology-Dr.Goldberg-3/2023 3. podiatry-NO LONGER GOES 4. apnea doctor-Dr. Live 5. gastro--Dr. Sanchez 6.-Dr. Michelle "to do" 7.Endocrine-Dr. Nino 8.renal-Dr. Chacko colonoscopy 3/2018 with next in 5 years "to do" abdominal sonogram=6/2023=fatty liver discussed shingles/Prevnar/TDAP vaccine//Covid vaccine rec but pt declines, declines flu vaccine carotid sonogram was 10/2020=no stenosis Echo via cardio=4/2023 thyroid sonogram via endocrinology CT lung screening not applicable as patient quit greater than 30 years ago CT lung done on 1/2023=stable since 2019.

## 2024-02-08 LAB
ALBUMIN SERPL ELPH-MCNC: 3.7 G/DL
ALP BLD-CCNC: 105 U/L
ALT SERPL-CCNC: 41 U/L
ANION GAP SERPL CALC-SCNC: 12 MMOL/L
AST SERPL-CCNC: 33 U/L
BASOPHILS # BLD AUTO: 0.05 K/UL
BASOPHILS NFR BLD AUTO: 0.5 %
BILIRUB SERPL-MCNC: 0.2 MG/DL
BUN SERPL-MCNC: 23 MG/DL
CALCIUM SERPL-MCNC: 8.8 MG/DL
CHLORIDE SERPL-SCNC: 106 MMOL/L
CHOLEST SERPL-MCNC: 156 MG/DL
CO2 SERPL-SCNC: 21 MMOL/L
CREAT SERPL-MCNC: 1.89 MG/DL
EGFR: 38 ML/MIN/1.73M2
EOSINOPHIL # BLD AUTO: 0.09 K/UL
EOSINOPHIL NFR BLD AUTO: 0.9 %
ESTIMATED AVERAGE GLUCOSE: 180 MG/DL
GLUCOSE SERPL-MCNC: 199 MG/DL
HBA1C MFR BLD HPLC: 7.9 %
HCT VFR BLD CALC: 44.5 %
HDLC SERPL-MCNC: 40 MG/DL
HGB BLD-MCNC: 14.5 G/DL
IMM GRANULOCYTES NFR BLD AUTO: 0.3 %
LDLC SERPL CALC-MCNC: 88 MG/DL
LYMPHOCYTES # BLD AUTO: 1.52 K/UL
LYMPHOCYTES NFR BLD AUTO: 16 %
MAGNESIUM SERPL-MCNC: 2 MG/DL
MAN DIFF?: NORMAL
MCHC RBC-ENTMCNC: 27.6 PG
MCHC RBC-ENTMCNC: 32.6 GM/DL
MCV RBC AUTO: 84.8 FL
MONOCYTES # BLD AUTO: 0.77 K/UL
MONOCYTES NFR BLD AUTO: 8.1 %
NEUTROPHILS # BLD AUTO: 7.05 K/UL
NEUTROPHILS NFR BLD AUTO: 74.2 %
NONHDLC SERPL-MCNC: 115 MG/DL
PLATELET # BLD AUTO: 233 K/UL
POTASSIUM SERPL-SCNC: 4.1 MMOL/L
PROT SERPL-MCNC: 6.9 G/DL
RBC # BLD: 5.25 M/UL
RBC # FLD: 15.8 %
SODIUM SERPL-SCNC: 140 MMOL/L
TRIGL SERPL-MCNC: 158 MG/DL
TSH SERPL-ACNC: 2.67 UIU/ML
WBC # FLD AUTO: 9.51 K/UL

## 2024-02-19 NOTE — ASSESSMENT
.ag   [FreeTextEntry1] : Cutivate cream given.Venipuncture done in our office, Labs sent out.Patient advised to continue present medications with diet and exercise. Patient will follow up with specialists as scheduled. Patient will return to the office in 3months for CP\par \par Dr. Alvares was present in office building while I examined patient\par \par \par 24-hour urine was done 7/2021\par MA=1/2022\par specialists include..\par 1. cardiology-Dr. Eckert\par 2. ophthalmology-Dr.Goldberg-10/2020 "to do"\par 3. podiatry-NO LONGER GOES\par 4. apnea doctor-Dr. Live\par 5. gastro--Dr. Sanchez\par 6.-Dr. Michelle\par 7.Endocrine-Dr. Nino\par 8.renal-Dr. Gong\par colonoscopy 3/2018\par abdominal sonogram=1/2021=fatty liver/MRI abd 11/2021=ADRIAN\par discussed shingles vaccine/To skip flu shot at this time/Covid vaccine rec but pt declines\par carotid sonogram was 10/2020=no stenosis\par Echo  via cardio\par stress test 7/2021\par Hep C screen=12/2020\par thyroid sonogram via endocrinology 7/2021, followup per endo\par CT lung screening not applicable as patient quit greater than 30 years ago\par CT lung done on 11/2021

## 2024-03-25 ENCOUNTER — APPOINTMENT (OUTPATIENT)
Dept: OPHTHALMOLOGY | Facility: CLINIC | Age: 69
End: 2024-03-25
Payer: MEDICARE

## 2024-03-25 ENCOUNTER — NON-APPOINTMENT (OUTPATIENT)
Age: 69
End: 2024-03-25

## 2024-03-25 PROCEDURE — 92250 FUNDUS PHOTOGRAPHY W/I&R: CPT

## 2024-03-25 PROCEDURE — 92014 COMPRE OPH EXAM EST PT 1/>: CPT

## 2024-03-26 RX ORDER — LOSARTAN POTASSIUM 25 MG/1
25 TABLET, FILM COATED ORAL DAILY
Qty: 1 | Refills: 3 | Status: ACTIVE | COMMUNITY
Start: 2023-12-04 | End: 1900-01-01

## 2024-03-28 ENCOUNTER — APPOINTMENT (OUTPATIENT)
Dept: UROLOGY | Facility: CLINIC | Age: 69
End: 2024-03-28
Payer: MEDICARE

## 2024-03-28 VITALS
RESPIRATION RATE: 17 BRPM | BODY MASS INDEX: 33.64 KG/M2 | HEART RATE: 71 BPM | DIASTOLIC BLOOD PRESSURE: 84 MMHG | WEIGHT: 235 LBS | HEIGHT: 70 IN | TEMPERATURE: 97.8 F | SYSTOLIC BLOOD PRESSURE: 128 MMHG

## 2024-03-28 DIAGNOSIS — C64.2 MALIGNANT NEOPLASM OF LEFT KIDNEY, EXCEPT RENAL PELVIS: ICD-10-CM

## 2024-03-28 PROCEDURE — G2211 COMPLEX E/M VISIT ADD ON: CPT

## 2024-03-28 PROCEDURE — 99213 OFFICE O/P EST LOW 20 MIN: CPT

## 2024-03-28 NOTE — HISTORY OF PRESENT ILLNESS
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

## 2024-03-28 NOTE — ASSESSMENT
[FreeTextEntry1] : S/P roberto partial nephrectomies in 2018 and 2019  He is doing well  His creat is 1.89 GFR 35  Diabetes under control  We will do MRI for follow up He is 5 years out from last partial nephrectomy  Follow up in 2 year with ultrasound

## 2024-03-28 NOTE — PHYSICAL EXAM
[Normal Appearance] : normal appearance [Well Groomed] : well groomed [General Appearance - In No Acute Distress] : no acute distress [Edema] : no peripheral edema [Respiration, Rhythm And Depth] : normal respiratory rhythm and effort [Abdomen Soft] : soft [Exaggerated Use Of Accessory Muscles For Inspiration] : no accessory muscle use [Costovertebral Angle Tenderness] : no ~M costovertebral angle tenderness [Abdomen Tenderness] : non-tender [Urinary Bladder Findings] : the bladder was normal on palpation [Normal Station and Gait] : the gait and station were normal for the patient's age [No Focal Deficits] : no focal deficits [] : no rash [Oriented To Time, Place, And Person] : oriented to person, place, and time [Affect] : the affect was normal [Mood] : the mood was normal [No Palpable Adenopathy] : no palpable adenopathy

## 2024-03-29 ENCOUNTER — RESULT REVIEW (OUTPATIENT)
Age: 69
End: 2024-03-29

## 2024-04-01 RX ORDER — APIXABAN 5 MG/1
5 TABLET, FILM COATED ORAL
Qty: 60 | Refills: 5 | Status: ACTIVE | COMMUNITY
Start: 2019-03-30

## 2024-04-18 ENCOUNTER — APPOINTMENT (OUTPATIENT)
Dept: CT IMAGING | Facility: CLINIC | Age: 69
End: 2024-04-18
Payer: MEDICARE

## 2024-04-18 ENCOUNTER — APPOINTMENT (OUTPATIENT)
Dept: MRI IMAGING | Facility: CLINIC | Age: 69
End: 2024-04-18
Payer: MEDICARE

## 2024-04-18 ENCOUNTER — APPOINTMENT (OUTPATIENT)
Dept: ULTRASOUND IMAGING | Facility: CLINIC | Age: 69
End: 2024-04-18
Payer: MEDICARE

## 2024-04-18 ENCOUNTER — OUTPATIENT (OUTPATIENT)
Dept: OUTPATIENT SERVICES | Facility: HOSPITAL | Age: 69
LOS: 1 days | End: 2024-04-18

## 2024-04-18 DIAGNOSIS — Z98.890 OTHER SPECIFIED POSTPROCEDURAL STATES: Chronic | ICD-10-CM

## 2024-04-18 DIAGNOSIS — I48.91 UNSPECIFIED ATRIAL FIBRILLATION: Chronic | ICD-10-CM

## 2024-04-18 DIAGNOSIS — C64.2 MALIGNANT NEOPLASM OF LEFT KIDNEY, EXCEPT RENAL PELVIS: ICD-10-CM

## 2024-04-18 DIAGNOSIS — Z90.5 ACQUIRED ABSENCE OF KIDNEY: Chronic | ICD-10-CM

## 2024-04-18 PROCEDURE — 76536 US EXAM OF HEAD AND NECK: CPT | Mod: 26

## 2024-04-18 PROCEDURE — 71250 CT THORAX DX C-: CPT | Mod: 26

## 2024-04-18 PROCEDURE — 74181 MRI ABDOMEN W/O CONTRAST: CPT | Mod: 26

## 2024-04-19 RX ORDER — ROSUVASTATIN CALCIUM 5 MG/1
5 TABLET, FILM COATED ORAL DAILY
Qty: 30 | Refills: 5 | Status: ACTIVE | COMMUNITY
Start: 2017-04-04

## 2024-04-22 ENCOUNTER — LABORATORY RESULT (OUTPATIENT)
Age: 69
End: 2024-04-22

## 2024-04-22 LAB
CREAT SPEC-SCNC: 77 MG/DL
MICROALBUMIN 24H UR DL<=1MG/L-MCNC: 30.5 MG/DL
MICROALBUMIN/CREAT 24H UR-RTO: 396 MG/G

## 2024-04-23 ENCOUNTER — APPOINTMENT (OUTPATIENT)
Dept: ENDOCRINOLOGY | Facility: CLINIC | Age: 69
End: 2024-04-23
Payer: MEDICARE

## 2024-04-23 VITALS
HEART RATE: 64 BPM | DIASTOLIC BLOOD PRESSURE: 82 MMHG | HEIGHT: 70 IN | OXYGEN SATURATION: 99 % | BODY MASS INDEX: 34.65 KG/M2 | SYSTOLIC BLOOD PRESSURE: 144 MMHG | RESPIRATION RATE: 16 BRPM | WEIGHT: 242 LBS

## 2024-04-23 DIAGNOSIS — E04.2 NONTOXIC MULTINODULAR GOITER: ICD-10-CM

## 2024-04-23 LAB
ALBUMIN SERPL ELPH-MCNC: 4.2 G/DL
ALP BLD-CCNC: 114 U/L
ALT SERPL-CCNC: 49 U/L
ANION GAP SERPL CALC-SCNC: 18 MMOL/L
AST SERPL-CCNC: 39 U/L
BILIRUB SERPL-MCNC: 0.3 MG/DL
BUN SERPL-MCNC: 26 MG/DL
CALCIUM SERPL-MCNC: 9.1 MG/DL
CHLORIDE SERPL-SCNC: 108 MMOL/L
CHOLEST SERPL-MCNC: 150 MG/DL
CO2 SERPL-SCNC: 19 MMOL/L
CREAT SERPL-MCNC: 1.9 MG/DL
EGFR: 38 ML/MIN/1.73M2
GLUCOSE SERPL-MCNC: 186 MG/DL
HDLC SERPL-MCNC: 31 MG/DL
LDLC SERPL CALC-MCNC: 79 MG/DL
NONHDLC SERPL-MCNC: 119 MG/DL
POTASSIUM SERPL-SCNC: 4.6 MMOL/L
PROT SERPL-MCNC: 6.9 G/DL
SODIUM SERPL-SCNC: 145 MMOL/L
T4 FREE SERPL-MCNC: 1.5 NG/DL
TRIGL SERPL-MCNC: 242 MG/DL
TSH SERPL-ACNC: 2.72 UIU/ML

## 2024-04-23 PROCEDURE — 99214 OFFICE O/P EST MOD 30 MIN: CPT

## 2024-04-23 PROCEDURE — G2211 COMPLEX E/M VISIT ADD ON: CPT

## 2024-04-23 RX ORDER — INSULIN GLARGINE 100 [IU]/ML
100 INJECTION, SOLUTION SUBCUTANEOUS
Qty: 11 | Refills: 1 | Status: ACTIVE | COMMUNITY
Start: 2019-07-05 | End: 1900-01-01

## 2024-04-23 NOTE — PHYSICAL EXAM
[Alert] : alert [Well Nourished] : well nourished [Obese] : obese [No Acute Distress] : no acute distress [Well Developed] : well developed [Normal Sclera/Conjunctiva] : normal sclera/conjunctiva [EOMI] : extra ocular movement intact [No Proptosis] : no proptosis [Normal Oropharynx] : the oropharynx was normal [Thyroid Not Enlarged] : the thyroid was not enlarged [No Respiratory Distress] : no respiratory distress [No Accessory Muscle Use] : no accessory muscle use [Clear to Auscultation] : lungs were clear to auscultation bilaterally [Normal S1, S2] : normal S1 and S2 [Normal Rate] : heart rate was normal [Regular Rhythm] : with a regular rhythm [No Edema] : no peripheral edema [Pedal Pulses Normal] : the pedal pulses are present [Normal Bowel Sounds] : normal bowel sounds [Not Tender] : non-tender [Not Distended] : not distended [Soft] : abdomen soft [No Rash] : no rash [Acanthosis Nigricans] : no acanthosis nigricans [No Tremors] : no tremors [Oriented x3] : oriented to person, place, and time [de-identified] : thryodi nodularity B/L

## 2024-05-07 ENCOUNTER — APPOINTMENT (OUTPATIENT)
Dept: NEPHROLOGY | Facility: CLINIC | Age: 69
End: 2024-05-07
Payer: MEDICARE

## 2024-05-07 VITALS
HEART RATE: 74 BPM | OXYGEN SATURATION: 97 % | WEIGHT: 232 LBS | DIASTOLIC BLOOD PRESSURE: 82 MMHG | BODY MASS INDEX: 33.21 KG/M2 | SYSTOLIC BLOOD PRESSURE: 128 MMHG | HEIGHT: 70 IN

## 2024-05-07 PROCEDURE — 99214 OFFICE O/P EST MOD 30 MIN: CPT

## 2024-05-07 NOTE — ASSESSMENT
[FreeTextEntry1] : 68-year-old male with past medical history significant for hypertension, dilated cardiomyopathy, DM, RCC s/p Bilateral partial nephrectomies here for follow-up of CKD 3/proteinuria  1.  Proteinuria: Proteinuria likely related to diabetic nephropathy, also patient s/p partial nephrectomy. -Continue on Jardiance and losartan 25 mg daily -Improvement in UACR 826->621->396 -Maintain A1c less than 7 and blood pressure less than 130/80 -Will repeat UACR today.  2.  Chronic kidney disease stage III: Patient with longstanding DM/hypertension and s/p b/l partial nephrectomies. Serum creatinine slowly progressing now in the range of 1.8-2. Stable on last labs obtained on 22nd April, SCr 1.9 GFR 38. UA with proteinuria. MRI abdomen reviewed, right renal cyst 2.4 cm, partial nephrectomies, no recurrence of metastatic disease.  Recommendations as above.  Will also check BMD labs.  3.  Hypertension: Blood pressure Within acceptable limits, continue on current medications including ARB.  4.  Diabetes mellitus: Controlled, Management per endocrinology.  Follow-up 6-8 months.

## 2024-05-07 NOTE — HISTORY OF PRESENT ILLNESS
[FreeTextEntry1] : 68-year-old male with past medical history significant for hypertension, dilated cardiomyopathy, DM, RCC s/p b/l partial nephrectomy here for follow-up of proteinuria and CKD 3 Patient reports no health-related adverse event since last clinic visit.  NO ER/Hospitalization/urgent care visit. Denies any cardiac or urinary complaints. No dysuria, gross hematuria, SOB, LUTS. Reports BP has been well controlled. He is following up with endocrinology and will be going on a special diet under supervision of dietitian in attempt of reducing some weight. Had a follow-up MRI abdomen on 18th April which was reviewed.

## 2024-05-08 ENCOUNTER — APPOINTMENT (OUTPATIENT)
Dept: INTERNAL MEDICINE | Facility: CLINIC | Age: 69
End: 2024-05-08
Payer: MEDICARE

## 2024-05-08 VITALS
SYSTOLIC BLOOD PRESSURE: 130 MMHG | HEIGHT: 70 IN | RESPIRATION RATE: 14 BRPM | HEART RATE: 78 BPM | BODY MASS INDEX: 34.07 KG/M2 | DIASTOLIC BLOOD PRESSURE: 80 MMHG | OXYGEN SATURATION: 98 % | WEIGHT: 238 LBS

## 2024-05-08 DIAGNOSIS — K76.0 FATTY (CHANGE OF) LIVER, NOT ELSEWHERE CLASSIFIED: ICD-10-CM

## 2024-05-08 DIAGNOSIS — E78.5 HYPERLIPIDEMIA, UNSPECIFIED: ICD-10-CM

## 2024-05-08 DIAGNOSIS — N18.30 CHRONIC KIDNEY DISEASE, STAGE 3 UNSPECIFIED: ICD-10-CM

## 2024-05-08 DIAGNOSIS — I48.20 CHRONIC ATRIAL FIBRILLATION, UNSP: ICD-10-CM

## 2024-05-08 DIAGNOSIS — E11.9 TYPE 2 DIABETES MELLITUS W/OUT COMPLICATIONS: ICD-10-CM

## 2024-05-08 DIAGNOSIS — I10 ESSENTIAL (PRIMARY) HYPERTENSION: ICD-10-CM

## 2024-05-08 DIAGNOSIS — E66.9 OBESITY, UNSPECIFIED: ICD-10-CM

## 2024-05-08 DIAGNOSIS — Z79.899 OTHER LONG TERM (CURRENT) DRUG THERAPY: ICD-10-CM

## 2024-05-08 LAB
25(OH)D3 SERPL-MCNC: 55.5 NG/ML
ALBUMIN SERPL ELPH-MCNC: 4.2 G/DL
ALP BLD-CCNC: 115 U/L
ALT SERPL-CCNC: 42 U/L
ANION GAP SERPL CALC-SCNC: 16 MMOL/L
APPEARANCE: CLEAR
AST SERPL-CCNC: 29 U/L
BACTERIA: NEGATIVE /HPF
BILIRUB SERPL-MCNC: 0.4 MG/DL
BILIRUBIN URINE: NEGATIVE
BLOOD URINE: NEGATIVE
BUN SERPL-MCNC: 29 MG/DL
CALCIUM SERPL-MCNC: 9.1 MG/DL
CALCIUM SERPL-MCNC: 9.1 MG/DL
CAST: 0 /LPF
CHLORIDE SERPL-SCNC: 105 MMOL/L
CO2 SERPL-SCNC: 19 MMOL/L
COLOR: YELLOW
CREAT SERPL-MCNC: 2.09 MG/DL
EGFR: 34 ML/MIN/1.73M2
EPITHELIAL CELLS: 0 /HPF
GLUCOSE QUALITATIVE U: >=1000 MG/DL
GLUCOSE SERPL-MCNC: 320 MG/DL
KETONES URINE: NEGATIVE MG/DL
LEUKOCYTE ESTERASE URINE: NEGATIVE
MICROSCOPIC-UA: NORMAL
NITRITE URINE: NEGATIVE
PARATHYROID HORMONE INTACT: 49 PG/ML
PH URINE: 5.5
POTASSIUM SERPL-SCNC: 4.7 MMOL/L
PROT SERPL-MCNC: 7.2 G/DL
PROTEIN URINE: 30 MG/DL
RED BLOOD CELLS URINE: 0 /HPF
SODIUM SERPL-SCNC: 141 MMOL/L
SPECIFIC GRAVITY URINE: >1.03
UROBILINOGEN URINE: 0.2 MG/DL
WHITE BLOOD CELLS URINE: 0 /HPF

## 2024-05-08 PROCEDURE — 99214 OFFICE O/P EST MOD 30 MIN: CPT

## 2024-05-08 PROCEDURE — G2211 COMPLEX E/M VISIT ADD ON: CPT

## 2024-05-09 LAB
CREAT SPEC-SCNC: 54 MG/DL
MICROALBUMIN 24H UR DL<=1MG/L-MCNC: 14.6 MG/DL
MICROALBUMIN/CREAT 24H UR-RTO: 271 MG/G

## 2024-05-13 NOTE — HISTORY OF PRESENT ILLNESS
[FreeTextEntry1] : Patient presents for followup on hypertension/hyperlipidemia/type 2 diabetes. Patient is currently on Cartia/Toprol/hydralazine/cozaar for hypertension,On Crestor for hyperlipidemia and is on NovoLog/Lantus/glipizide/jardiance for type 2 diabetes -declines covid vaccine -Had blood work done with endocrinology on 4/22 -Cardio decreased to 180 mg twice daily per cardiology

## 2024-05-13 NOTE — DATA REVIEWED
[FreeTextEntry1] : Labs done by endocrinology on 4/22 -CBC = normal -A1c of 7.7 -TSH = normal -Triglycerides elevated at 242 with HDL low at 31 -Creatinine stable at 1.9 with ALT of 49 with known fatty liver  **Diet and exercise

## 2024-05-13 NOTE — PHYSICAL EXAM
[General Appearance - In No Acute Distress] : in no acute distress [Respiration, Rhythm And Depth] : normal respiratory rhythm and effort [Auscultation Breath Sounds / Voice Sounds] : lungs were clear to auscultation bilaterally [Affect] : the affect was normal [Mood] : the mood was normal [FreeTextEntry1] : + irreg with CVR

## 2024-05-13 NOTE — ASSESSMENT
[FreeTextEntry1] : Patient advised to continue present medications with diet and exercise. Patient will follow up with specialists as scheduled. Patient will return to the office in 3-4months  24-hour urine was done 11/2023=follows with renal MA=4/2024 specialists include.. 1. cardiology-Dr. Eckert 2. ophthalmology-Dr.Goldberg-3/2024 3. podiatry-NO LONGER GOES 4. apnea doctor-Dr. Live 5. gastro--Dr. Sanchez 6.-Dr. Michelle  7.Endocrine-Dr. Nino 8.renal-Dr. Chacko colonoscopy 3/2018 with next in 5 years "to do" abdominal sonogram=6/2023=fatty liver discussed shingles/Prevnar/TDAP vaccine//Covid vaccine rec but pt declines, declines flu vaccine carotid sonogram was 10/2020=no stenosis Echo via cardio=4/2023 thyroid sonogram via endocrinology CT lung screening not applicable as patient quit greater than 30 years ago CT lung done on 1/2023=stable since 2019.

## 2024-05-24 NOTE — DISCHARGE NOTE ADULT - NS AS ACTIVITY OBS
Other
Showering allowed/Stairs allowed/Walking-Indoors allowed/No Heavy lifting/straining/Walking-Outdoors allowed

## 2024-05-30 RX ORDER — EMPAGLIFLOZIN 10 MG/1
10 TABLET, FILM COATED ORAL DAILY
Qty: 30 | Refills: 5 | Status: ACTIVE | COMMUNITY
Start: 2020-11-17

## 2024-07-09 ENCOUNTER — TRANSCRIPTION ENCOUNTER (OUTPATIENT)
Age: 69
End: 2024-07-09

## 2024-07-15 ENCOUNTER — RX RENEWAL (OUTPATIENT)
Age: 69
End: 2024-07-15

## 2024-07-31 ENCOUNTER — APPOINTMENT (OUTPATIENT)
Dept: ENDOCRINOLOGY | Facility: CLINIC | Age: 69
End: 2024-07-31
Payer: MEDICARE

## 2024-07-31 VITALS
BODY MASS INDEX: 34.65 KG/M2 | SYSTOLIC BLOOD PRESSURE: 134 MMHG | WEIGHT: 242 LBS | HEART RATE: 68 BPM | RESPIRATION RATE: 16 BRPM | DIASTOLIC BLOOD PRESSURE: 88 MMHG | HEIGHT: 70 IN | OXYGEN SATURATION: 98 %

## 2024-07-31 DIAGNOSIS — E78.5 HYPERLIPIDEMIA, UNSPECIFIED: ICD-10-CM

## 2024-07-31 DIAGNOSIS — R79.89 OTHER SPECIFIED ABNORMAL FINDINGS OF BLOOD CHEMISTRY: ICD-10-CM

## 2024-07-31 DIAGNOSIS — I10 ESSENTIAL (PRIMARY) HYPERTENSION: ICD-10-CM

## 2024-07-31 DIAGNOSIS — E11.65 TYPE 2 DIABETES MELLITUS WITH HYPERGLYCEMIA: ICD-10-CM

## 2024-07-31 DIAGNOSIS — Z79.4 LONG TERM (CURRENT) USE OF INSULIN: ICD-10-CM

## 2024-07-31 PROCEDURE — 99215 OFFICE O/P EST HI 40 MIN: CPT

## 2024-07-31 NOTE — PHYSICAL EXAM
[Alert] : alert [Well Nourished] : well nourished [Obese] : obese [No Acute Distress] : no acute distress [Well Developed] : well developed [Normal Sclera/Conjunctiva] : normal sclera/conjunctiva [EOMI] : extra ocular movement intact [No Proptosis] : no proptosis [Normal Oropharynx] : the oropharynx was normal [Thyroid Not Enlarged] : the thyroid was not enlarged [No Respiratory Distress] : no respiratory distress [No Accessory Muscle Use] : no accessory muscle use [Clear to Auscultation] : lungs were clear to auscultation bilaterally [Normal S1, S2] : normal S1 and S2 [Normal Rate] : heart rate was normal [Regular Rhythm] : with a regular rhythm [No Edema] : no peripheral edema [Pedal Pulses Normal] : the pedal pulses are present [Normal Bowel Sounds] : normal bowel sounds [Not Tender] : non-tender [Not Distended] : not distended [Soft] : abdomen soft [No Rash] : no rash [No Tremors] : no tremors [Oriented x3] : oriented to person, place, and time [Acanthosis Nigricans] : no acanthosis nigricans [de-identified] : thryodi nodularity B/L

## 2024-07-31 NOTE — ASSESSMENT
[Carbohydrate Consistent Diet] : carbohydrate consistent diet [Exercise/Effect on Glucose] : exercise/effect on glucose [Weight Loss] : weight loss [FreeTextEntry1] :  DM2 mildly uncontrolled in patient with nephropathy, HTN , HLD , CHENEY. Now s/p renal carcinoma resection B/L. cannot afford glp1  DM2 : a1c 9.1 getting worse  he needs to stop with pizza and bread and icecream  continue for now glipizide XL 10 mg BID ,  jardiance 10 mg qd cont  tresiba to 33 u HS  increase  humalog 13-15-19 u TDI w meals.  nephropathy CKD stage 4 : GFR stable. cont SGLt2. Followup with nephrology  Bgs 4 x day and fax  me logbook in 1 week  CHENEY : Lfts normal. Contineu to monitor. discussed mounajro and he is open to it. High cost . He will verify with insurance  I have counseled the patient on the benefits, risks and side effects of this GLP-1 agonist. We discussed the benefits from cardiac standpoint and on glucose and weight reduction. We also discussed side effects with the patient including possible nausea, vomiting or other GI side effects. I have also discussed the risk of pancreatitis, including the symptoms to look out for, such as food intolerance, nausea/emesis and abdominal pain. The patient was also explained the link with medullary thyroid cancer and has denied any personal or family history of medullary thyroid cancer. Patient able to verbalize and teach back understanding of risks vs benefits of this GLP-1.  HTN: continue management per cardiology.  HLD: LDL good. high TG  continue crestor  Obesity: encouraged more exercise walking 30 min 3 x week  Goiter: FNA both nodules thyroid August 2021 benign. declines compressive sx  Us April 2024: stable repeat US in May 2025

## 2024-08-21 ENCOUNTER — APPOINTMENT (OUTPATIENT)
Dept: INTERNAL MEDICINE | Facility: CLINIC | Age: 69
End: 2024-08-21
Payer: MEDICARE

## 2024-08-21 VITALS
HEIGHT: 70 IN | OXYGEN SATURATION: 97 % | BODY MASS INDEX: 34.36 KG/M2 | WEIGHT: 240 LBS | RESPIRATION RATE: 15 BRPM | SYSTOLIC BLOOD PRESSURE: 135 MMHG | DIASTOLIC BLOOD PRESSURE: 82 MMHG | HEART RATE: 64 BPM

## 2024-08-21 DIAGNOSIS — E78.5 HYPERLIPIDEMIA, UNSPECIFIED: ICD-10-CM

## 2024-08-21 DIAGNOSIS — I10 ESSENTIAL (PRIMARY) HYPERTENSION: ICD-10-CM

## 2024-08-21 DIAGNOSIS — E11.65 TYPE 2 DIABETES MELLITUS WITH HYPERGLYCEMIA: ICD-10-CM

## 2024-08-21 DIAGNOSIS — Z79.899 OTHER LONG TERM (CURRENT) DRUG THERAPY: ICD-10-CM

## 2024-08-21 DIAGNOSIS — N18.30 CHRONIC KIDNEY DISEASE, STAGE 3 UNSPECIFIED: ICD-10-CM

## 2024-08-21 DIAGNOSIS — E04.9 NONTOXIC GOITER, UNSPECIFIED: ICD-10-CM

## 2024-08-21 DIAGNOSIS — K76.0 FATTY (CHANGE OF) LIVER, NOT ELSEWHERE CLASSIFIED: ICD-10-CM

## 2024-08-21 DIAGNOSIS — E66.9 OBESITY, UNSPECIFIED: ICD-10-CM

## 2024-08-21 PROCEDURE — G2211 COMPLEX E/M VISIT ADD ON: CPT

## 2024-08-21 PROCEDURE — 36415 COLL VENOUS BLD VENIPUNCTURE: CPT

## 2024-08-21 PROCEDURE — 99214 OFFICE O/P EST MOD 30 MIN: CPT

## 2024-08-21 NOTE — ASSESSMENT
[FreeTextEntry1] : Venipuncture done in our office, Labs sent out.Patient advised to continue present medications with diet and exercise. Patient will follow up with specialists as scheduled. Patient will return to the office in 3-4months for CP  24-hour urine was done 11/2023=follows with renal MA=4/2024 specialists include.. 1. cardiology-Dr. Eckert 2. ophthalmology-Dr.Goldberg-3/2024 3. podiatry-NO LONGER GOES 4. apnea doctor-Dr. Live 5. gastro--Dr. Sanchez 6.-Dr. Michelle 7.Endocrine-Dr. Nino 8.renal-Dr. Chacko colonoscopy 3/2018 with next in 5 years "to do", FIT Test given abdominal sonogram=6/2023=fatty liver discussed shingles/Prevnar/TDAP vaccine//Covid vaccine rec but pt declines, declines flu vaccine Echo via cardio thyroid sonogram via endocrinology CT lung screening not applicable as patient quit greater than 30 years ago CT lung done on 1/2023=stable since 2019.

## 2024-08-21 NOTE — HISTORY OF PRESENT ILLNESS
[FreeTextEntry1] : Patient presents for followup on hypertension/hyperlipidemia/type 2 diabetes. Patient is currently on Cartia/Toprol/hydralazine/cozaar for hypertension,On Crestor for hyperlipidemia and is on NovoLog/Lantus/glipizide/jardiance for type 2 diabetes -declines covid vaccine

## 2024-08-21 NOTE — PHYSICAL EXAM
[Respiration, Rhythm And Depth] : normal respiratory rhythm and effort [General Appearance - In No Acute Distress] : in no acute distress [Auscultation Breath Sounds / Voice Sounds] : lungs were clear to auscultation bilaterally [Affect] : the affect was normal [Mood] : the mood was normal [FreeTextEntry1] : + irreg with CVR

## 2024-08-22 LAB
ALBUMIN SERPL ELPH-MCNC: 4.3 G/DL
ALP BLD-CCNC: 95 U/L
ALT SERPL-CCNC: 43 U/L
ANION GAP SERPL CALC-SCNC: 20 MMOL/L
AST SERPL-CCNC: 31 U/L
BASOPHILS # BLD AUTO: 0.07 K/UL
BASOPHILS NFR BLD AUTO: 0.7 %
BILIRUB SERPL-MCNC: 0.3 MG/DL
BUN SERPL-MCNC: 27 MG/DL
CALCIUM SERPL-MCNC: 9.2 MG/DL
CHLORIDE SERPL-SCNC: 106 MMOL/L
CHOLEST SERPL-MCNC: 175 MG/DL
CO2 SERPL-SCNC: 18 MMOL/L
CREAT SERPL-MCNC: 1.93 MG/DL
EGFR: 37 ML/MIN/1.73M2
EOSINOPHIL # BLD AUTO: 0.24 K/UL
EOSINOPHIL NFR BLD AUTO: 2.5 %
ESTIMATED AVERAGE GLUCOSE: 214 MG/DL
GLUCOSE SERPL-MCNC: 178 MG/DL
HBA1C MFR BLD HPLC: 9.1 %
HCT VFR BLD CALC: 49.8 %
HDLC SERPL-MCNC: 45 MG/DL
HGB BLD-MCNC: 15.8 G/DL
IMM GRANULOCYTES NFR BLD AUTO: 0.9 %
LDLC SERPL CALC-MCNC: 100 MG/DL
LYMPHOCYTES # BLD AUTO: 1.99 K/UL
LYMPHOCYTES NFR BLD AUTO: 20.8 %
MAGNESIUM SERPL-MCNC: 2.1 MG/DL
MAN DIFF?: NORMAL
MCHC RBC-ENTMCNC: 27.3 PG
MCHC RBC-ENTMCNC: 31.7 GM/DL
MCV RBC AUTO: 86 FL
MONOCYTES # BLD AUTO: 0.86 K/UL
MONOCYTES NFR BLD AUTO: 9 %
NEUTROPHILS # BLD AUTO: 6.34 K/UL
NEUTROPHILS NFR BLD AUTO: 66.1 %
NONHDLC SERPL-MCNC: 130 MG/DL
PLATELET # BLD AUTO: 208 K/UL
POTASSIUM SERPL-SCNC: 4.7 MMOL/L
PROT SERPL-MCNC: 7.4 G/DL
RBC # BLD: 5.79 M/UL
RBC # FLD: 15.6 %
SODIUM SERPL-SCNC: 144 MMOL/L
TRIGL SERPL-MCNC: 172 MG/DL
TSH SERPL-ACNC: 3.53 UIU/ML
WBC # FLD AUTO: 9.59 K/UL

## 2024-09-23 ENCOUNTER — NON-APPOINTMENT (OUTPATIENT)
Age: 69
End: 2024-09-23

## 2024-09-23 ENCOUNTER — APPOINTMENT (OUTPATIENT)
Dept: OPHTHALMOLOGY | Facility: CLINIC | Age: 69
End: 2024-09-23
Payer: MEDICARE

## 2024-09-23 PROCEDURE — 92201 OPSCPY EXTND RTA DRAW UNI/BI: CPT

## 2024-09-23 PROCEDURE — 92012 INTRM OPH EXAM EST PATIENT: CPT

## 2024-09-23 PROCEDURE — 92134 CPTRZ OPH DX IMG PST SGM RTA: CPT

## 2024-11-05 ENCOUNTER — APPOINTMENT (OUTPATIENT)
Dept: ENDOCRINOLOGY | Facility: CLINIC | Age: 69
End: 2024-11-05
Payer: MEDICARE

## 2024-11-05 ENCOUNTER — NON-APPOINTMENT (OUTPATIENT)
Age: 69
End: 2024-11-05

## 2024-11-05 VITALS
WEIGHT: 245 LBS | DIASTOLIC BLOOD PRESSURE: 80 MMHG | SYSTOLIC BLOOD PRESSURE: 120 MMHG | OXYGEN SATURATION: 98 % | HEART RATE: 60 BPM | TEMPERATURE: 98 F | BODY MASS INDEX: 35.07 KG/M2 | HEIGHT: 70 IN | RESPIRATION RATE: 16 BRPM

## 2024-11-05 DIAGNOSIS — E04.2 NONTOXIC MULTINODULAR GOITER: ICD-10-CM

## 2024-11-05 DIAGNOSIS — E11.9 TYPE 2 DIABETES MELLITUS W/OUT COMPLICATIONS: ICD-10-CM

## 2024-11-05 PROCEDURE — 99214 OFFICE O/P EST MOD 30 MIN: CPT

## 2024-11-05 PROCEDURE — G2211 COMPLEX E/M VISIT ADD ON: CPT

## 2024-11-05 RX ORDER — SEMAGLUTIDE 0.68 MG/ML
2 INJECTION, SOLUTION SUBCUTANEOUS
Qty: 3 | Refills: 1 | Status: ACTIVE | COMMUNITY
Start: 2024-11-05 | End: 1900-01-01

## 2024-11-07 ENCOUNTER — APPOINTMENT (OUTPATIENT)
Dept: PULMONOLOGY | Facility: CLINIC | Age: 69
End: 2024-11-07
Payer: MEDICARE

## 2024-11-07 VITALS
RESPIRATION RATE: 16 BRPM | DIASTOLIC BLOOD PRESSURE: 74 MMHG | HEART RATE: 72 BPM | SYSTOLIC BLOOD PRESSURE: 142 MMHG | OXYGEN SATURATION: 97 %

## 2024-11-07 DIAGNOSIS — G47.33 OBSTRUCTIVE SLEEP APNEA (ADULT) (PEDIATRIC): ICD-10-CM

## 2024-11-07 PROCEDURE — 99213 OFFICE O/P EST LOW 20 MIN: CPT

## 2024-11-07 PROCEDURE — G2211 COMPLEX E/M VISIT ADD ON: CPT

## 2024-11-12 ENCOUNTER — APPOINTMENT (OUTPATIENT)
Dept: NEPHROLOGY | Facility: CLINIC | Age: 69
End: 2024-11-12
Payer: MEDICARE

## 2024-11-12 VITALS
BODY MASS INDEX: 35.5 KG/M2 | WEIGHT: 248 LBS | DIASTOLIC BLOOD PRESSURE: 82 MMHG | SYSTOLIC BLOOD PRESSURE: 130 MMHG | OXYGEN SATURATION: 98 % | HEART RATE: 73 BPM | HEIGHT: 70 IN

## 2024-11-12 PROCEDURE — 99212 OFFICE O/P EST SF 10 MIN: CPT

## 2024-11-13 ENCOUNTER — APPOINTMENT (OUTPATIENT)
Dept: INTERNAL MEDICINE | Facility: CLINIC | Age: 69
End: 2024-11-13
Payer: MEDICARE

## 2024-11-13 VITALS
WEIGHT: 248 LBS | DIASTOLIC BLOOD PRESSURE: 82 MMHG | BODY MASS INDEX: 35.5 KG/M2 | HEART RATE: 70 BPM | HEIGHT: 70 IN | OXYGEN SATURATION: 96 % | SYSTOLIC BLOOD PRESSURE: 126 MMHG | RESPIRATION RATE: 15 BRPM

## 2024-11-13 DIAGNOSIS — E66.9 OBESITY, UNSPECIFIED: ICD-10-CM

## 2024-11-13 DIAGNOSIS — Z00.00 ENCOUNTER FOR GENERAL ADULT MEDICAL EXAMINATION W/OUT ABNORMAL FINDINGS: ICD-10-CM

## 2024-11-13 DIAGNOSIS — E11.65 TYPE 2 DIABETES MELLITUS WITH HYPERGLYCEMIA: ICD-10-CM

## 2024-11-13 DIAGNOSIS — I42.8 OTHER CARDIOMYOPATHIES: ICD-10-CM

## 2024-11-13 DIAGNOSIS — Z79.4 LONG TERM (CURRENT) USE OF INSULIN: ICD-10-CM

## 2024-11-13 DIAGNOSIS — K76.0 FATTY (CHANGE OF) LIVER, NOT ELSEWHERE CLASSIFIED: ICD-10-CM

## 2024-11-13 DIAGNOSIS — I10 ESSENTIAL (PRIMARY) HYPERTENSION: ICD-10-CM

## 2024-11-13 DIAGNOSIS — E55.9 VITAMIN D DEFICIENCY, UNSPECIFIED: ICD-10-CM

## 2024-11-13 DIAGNOSIS — Z79.899 OTHER LONG TERM (CURRENT) DRUG THERAPY: ICD-10-CM

## 2024-11-13 DIAGNOSIS — I48.20 CHRONIC ATRIAL FIBRILLATION, UNSP: ICD-10-CM

## 2024-11-13 DIAGNOSIS — N18.30 CHRONIC KIDNEY DISEASE, STAGE 3 UNSPECIFIED: ICD-10-CM

## 2024-11-13 PROCEDURE — G0439: CPT

## 2024-11-13 NOTE — ASU PREOP CHECKLIST - NOTHING BY MOUTH SINCE
Physical Therapy Evaluation and Treatment    Patient Name:  Tasha Hawley   MRN:  [id number]    Recommendations:     Discharge Recommendations: Moderate Intensity Therapy   Discharge Equipment Recommendations: to be determined by next level of care    Barriers to discharge:  limited functional endurance/independence    Assessment:     Tasha Hawley is a 69 y.o. female admitted with a medical diagnosis of Cellulitis of left lower extremity.  She presents with the following impairments/functional limitations: weakness, impaired endurance, impaired sensation, impaired self care skills, impaired functional mobility, gait instability, impaired balance, pain, decreased safety awareness, decreased lower extremity function, decreased ROM, impaired skin, edema, impaired cardiopulmonary response to activity.    PT evaluation completed. Pt's PLOF:  assistance with LBD, pt performing sponge baths and use of rollator for short distance mobility in home and w/c for longer distances. Pt at this time requires CGA with bed mobility and MOD A with transfer to standing/short distance ambulation to bedside chair with use of RW. *Pt reports 6-8 falls at home since October. PT recommending moderate intensity therapy. Therapy will continue to progress pt as able.     Rehab Prognosis: Good; patient would benefit from acute skilled PT services to address these deficits and reach maximum level of function.    Recent Surgery: * No surgery found *      Plan:     During this hospitalization, patient to be seen 4 x/week to address the identified rehab impairments via gait training, therapeutic activities, therapeutic exercises, neuromuscular re-education, wheelchair management/training and progress toward the following goals:    Plan of Care Expires:  12/13/24    Subjective     Chief Complaint: pain to BLE  Patient/Family Comments/goals: to increase functional mobility  Pain/Comfort:  Pain Rating 1:  (9.5/10 BLE)  Pain Addressed 1:  Reposition, Cessation of Activity, Nurse notified  Pain Rating Post-Intervention 1:  (not rated)    Patients cultural, spiritual, Jew conflicts given the current situation: no    Living Environment:  Lives with spouse in 1 story home with ramp to enter. Tub/shower (reports SC broke)  Prior to admission, patients level of function was:  assistance with LBD, pt performing sponge baths and use of rollator for short distance mobility in home and w/c for longer distances. Equipment used at home: wheelchair, rollator, walker, rolling, bedside commode.  DME owned (not currently used): none.  Upon discharge, patient will have assistance from spouse (unsure of 24/7 assistance).    Objective:     Communicated with Nurse prior to session.  Patient found HOB elevated with bed alarm, oxygen (B z-flex boots)  upon PT entry to room.    General Precautions: Standard, fall  Orthopedic Precautions:N/A   Braces: N/A  Respiratory Status: Nasal cannula, flow 2 L/min    Exams:  Cognitive Exam:  Patient is oriented to Person, Place, Time, and Situation  Sensation:    -       Impaired  light/touch to BLE (reports being able to lightly feel but reports numbness as well as pain to lower leg B )  RLE ROM: limited due weakness  RLE Strength: 2-/5 hip flexion, 3-/5 knee extension/flexion, 3-/5 ankle PF/DF  LLE ROM: limited due to weakness  LLE Strength: 2-/5 hip flexion, 3-/5 knee extension/flexion, 3-/5 ankle PF/DF    Functional Mobility:  Bed Mobility:     Supine to Sit: contact guard assistance  Transfers:     Sit to Stand:  moderate assistance with rolling walker  Bed to Chair: moderate assistance with  rolling walker  using  Step Transfer  Gait: ~3ft from bed to chair with use of RW and MOD A; including forward/lateral/retro steps.       AM-PAC 6 CLICK MOBILITY  Total Score:13       Treatment & Education:  Pt educated on role of PT.   Pt educated on hand placement/safe sequencing with use of RW with standing  mobility/transfers.  Pt with noted poor posture/forward trunk lean in standing (pt reports this is her baseline posture).   Pt's BP at end of session 126/80 and 94-95% SpO2 on documented O2.   Pt educated on use of call button for mobility needs in room; pt understanding.   PCT in room setting up pt's new bed/sheets.     Patient left up in chair with all lines intact, call button in reach, chair alarm on, Nurse notified, and PCT present.    GOALS:   Multidisciplinary Problems       Physical Therapy Goals          Problem: Physical Therapy    Goal Priority Disciplines Outcome Interventions   Physical Therapy Goal     PT, PT/OT Progressing    Description: Goals to be met by: 24     Patient will increase functional independence with mobility by performin. Supine to sit with Stand-by Assistance  2. Sit to supine with Stand-by Assistance  3. Sit to stand transfer with Stand-by Assistance with use of RW.  4. Bed to chair transfer with Stand-by Assistance using Rolling Walker  5. Gait  x 50 feet with Stand-by Assistance using Rolling Walker.                          History:     Past Medical History:   Diagnosis Date    Anxiety     Arthritis     Bilateral lower extremity edema     severe chronic    Carotid artery occlusion     Cataract     CHF (congestive heart failure)     Coronary artery disease     subtotalled LAD with collateral    Depression     Fever blister     Hard of hearing     Hypokalemia 2023    Hyponatremia 2022    Hypothyroid     Iron deficiency anemia     Lumbar radiculopathy     with chronic pain    Ocular migraine     Other emphysema 2023    Renal disorder     Sleep apnea     cpap       Past Surgical History:   Procedure Laterality Date    ADENOIDECTOMY      BACK SURGERY      x 12    CARDIAC CATHETERIZATION  2016    subtotalled LAD with right to left collaterals    CATARACT EXTRACTION W/  INTRAOCULAR LENS IMPLANT Left     Dr Coleman     CYSTOSCOPIC LITHOLAPAXY N/A 2019     Procedure: CYSTOLITHOLAPAXY;  Surgeon: Shireen Mayo MD;  Location: [facility] OR 2ND FLR;  Service: Urology;  Laterality: N/A;    CYSTOSCOPIC LITHOLAPAXY N/A 9/3/2019    Procedure: CYSTOLITHOLAPAXY;  Surgeon: Shireen Mayo MD;  Location: [facility] OR 2ND FLR;  Service: Urology;  Laterality: N/A;    CYSTOSCOPY N/A 7/13/2021    Procedure: CYSTOSCOPY;  Surgeon: Shireen Mayo MD;  Location: [facility] OR 1ST FLR;  Service: Urology;  Laterality: N/A;    CYSTOSCOPY  11/16/2021    Procedure: CYSTOSCOPY;  Surgeon: Shireen Mayo MD;  Location: [facility] OR 1ST FLR;  Service: Urology;;    CYSTOSCOPY  7/19/2022    Procedure: CYSTOSCOPY;  Surgeon: Shireen Mayo MD;  Location: [facility] OR 1ST FLR;  Service: Urology;;    CYSTOSCOPY WITH INJECTION OF PERIURETHRAL BULKING AGENT  7/19/2022    Procedure: CYSTOSCOPY, WITH PERIURETHRAL BULKING AGENT INJECTION-MACROPLASTIQUE;  Surgeon: Shireen Mayo MD;  Location: [facility] OR 1ST FLR;  Service: Urology;;    CYSTOSCOPY WITH INJECTION OF PERIURETHRAL BULKING AGENT N/A 3/28/2023    Procedure: CYSTOSCOPY, WITH PERIURETHRAL BULKING AGENT INJECTION;  Surgeon: Shireen Mayo MD;  [facility];  Service: Urology;  Laterality: N/A;  [place]    CYSTOSCOPY WITH INJECTION OF PERIURETHRAL BULKING AGENT N/A 11/14/2023    Procedure: CYSTOSCOPY, WITH PERIURETHRAL BULKING AGENT INJECTION;  Surgeon: Shireen Mayo MD;  Location: [facility] OR 1ST FLR;  Service: Urology;  Laterality: N/A;    CYSTOSCOPY,WITH BOTULINUM TOXIN INJECTION N/A 12/13/2022    Procedure: CYSTOSCOPY,WITH BOTULINUM TOXIN INJECTION;  Surgeon: Shireen Mayo MD;  Location: [facility] OR 1ST FLR;  Service: Urology;  Laterality: N/A;  300 U    CYSTOSCOPY,WITH BOTULINUM TOXIN INJECTION N/A 3/28/2023    Procedure: CYSTOSCOPY,WITH BOTULINUM TOXIN INJECTION;  Surgeon: Shireen Mayo MD;  Location: [facility] OR [address] [place] [address];  Service: Urology;  Laterality: N/A;  45 MIN.    300 UNITS    ESOPHAGOGASTRODUODENOSCOPY N/A 5/23/2018    Procedure:  ESOPHAGOGASTRODUODENOSCOPY (EGD);  Surgeon: Prince Max MD;  Location: [facility] ENDO (4TH FLR);  Service: Endoscopy;  Laterality: N/A;  r/s 'd per Dr. Max due to family emergency- ER    ESOPHAGOGASTRODUODENOSCOPY N/A 6/23/2023    Procedure: EGD (ESOPHAGOGASTRODUODENOSCOPY);  Surgeon: Juaquin Barry MD;  Location: [facility] ENDO (2ND FLR);  Service: Endoscopy;  Laterality: N/A;  Refferal: PRINCE MAX  Order  tele enc 5/18/23  dx: history of a Nissen fundoplication  Additional Scheduling Information:  On home oxygen 2nd floor for airway protection also with a pain pump elevated BMI close to 40   Prep Gastroparesis   ins    ESOPHAGOGASTRODUODENOSCOPY N/A 8/12/2024    Procedure: EGD (ESOPHAGOGASTRODUODENOSCOPY);  Surgeon: Cat Cortés MD;  Location: [facility] ENDO (2ND FLR);  Service: Endoscopy;  Laterality: N/A;  referral dr max / prep inst mailed / gastropareisis/  8/5-called pt for pre call-unable to lvm-portal message sent-tb-[facility] [employer]-intrathecal pain pump  8/7 precall complete -ml    HYSTERECTOMY  1975    endometriosis    INJECTION OF BOTULINUM TOXIN TYPE A  7/13/2021    Procedure: INJECTION, BOTULINUM TOXIN, 200units;  Surgeon: Shireen Myao MD;  [facility]  Service: Urology;;    INJECTION OF BOTULINUM TOXIN TYPE A  11/16/2021    Procedure: INJECTION, BOTULINUM TOXIN, 200units;  Surgeon: Shireen Mayo MD;  [facility];  Service: Urology;;    INJECTION OF BOTULINUM TOXIN TYPE A  7/19/2022    Procedure: INJECTION, BOTULINUM TOXIN, 300 units ;  Surgeon: Shireen Mayo MD;  [facility];  Service: Urology;;    INJECTION OF BOTULINUM TOXIN TYPE A N/A 11/14/2023    Procedure: INJECTION, BOTULINUM TOXIN, TYPE A;  Surgeon: Shireen Mayo MD;  Location: [facility] OR 1ST FLR;  Service: Urology;  Laterality: N/A;    INSERTION, SUPRAPUBIC CATHETER N/A 12/13/2022    Procedure: INSERTION, SUPRAPUBIC CATHETER;  Surgeon: Shireen Mayo MD;  Location:  [facility] OR 1ST FLR;  Service: Urology;  Laterality: N/A;  exchange    INSERTION, SUPRAPUBIC CATHETER N/A 11/14/2023    Procedure: INSERTION, SUPRAPUBIC CATHETER;  Surgeon: Shireen Mayo MD;  [facility];  Service: Urology;  Laterality: N/A;  EXCHANGE of s/p tube    pain pump placement      SQ Dilaudid Pump managed by Dr. Hillman, Pain Management    REMOVAL OF BONE SPUR OF FOOT Bilateral 9/16/2022    Procedure: EXCISION ARTHRITIC BONE, BILATERAL FOOT;  Surgeon: Adam Mcguire DPM;  Location: [facility] OR;  Service: Podiatry;  Laterality: Bilateral;    REPLACEMENT OF BACLOFEN PUMP N/A 8/2/2023    Procedure: REPLACEMENT, BACLOFEN PUMP;  Surgeon: Smitha Condon MD;  Location: [facility] OR 2ND FLR;  Service: Neurosurgery;  Laterality: N/A;  Anes: Gen  Blood: Type & Screen  Pos: Left Lat  Intrathecal Pump  Bed: Reg Reversed  Rad: C-arm  Pump: 40 cc, [employer]    REPLACEMENT OF CATHETER N/A 10/31/2019    Procedure: REPLACEMENT, CATHETER-SUPRAPUBIC;  Surgeon: Shireen Mayo MD;  Location: [facility] OR [address] [place] [address];  Service: Urology;  Laterality: N/A;    SPINAL CORD STIMULATOR REMOVAL      before [place]    SPINE SURGERY  5-13-13    CERVICAL FUSION    TONSILLECTOMY         Time Tracking:     PT Received On: 11/13/24  PT Start Time: 1128     PT Stop Time: 1205  PT Total Time (min): 37 min     Billable Minutes: Evaluation 10 and Therapeutic Activity 25 11/13/2024   15-Apr-2018 21:00

## 2024-11-16 ENCOUNTER — RX RENEWAL (OUTPATIENT)
Age: 69
End: 2024-11-16

## 2024-12-02 ENCOUNTER — NON-APPOINTMENT (OUTPATIENT)
Age: 69
End: 2024-12-02

## 2024-12-02 ENCOUNTER — APPOINTMENT (OUTPATIENT)
Dept: INTERNAL MEDICINE | Facility: CLINIC | Age: 69
End: 2024-12-02
Payer: MEDICARE

## 2024-12-02 VITALS
SYSTOLIC BLOOD PRESSURE: 135 MMHG | BODY MASS INDEX: 35.15 KG/M2 | HEART RATE: 75 BPM | RESPIRATION RATE: 14 BRPM | DIASTOLIC BLOOD PRESSURE: 80 MMHG | WEIGHT: 245 LBS

## 2024-12-02 DIAGNOSIS — Z91.81 HISTORY OF FALLING: ICD-10-CM

## 2024-12-02 DIAGNOSIS — M25.561 PAIN IN RIGHT KNEE: ICD-10-CM

## 2024-12-02 PROCEDURE — 99214 OFFICE O/P EST MOD 30 MIN: CPT

## 2024-12-02 PROCEDURE — G2211 COMPLEX E/M VISIT ADD ON: CPT

## 2024-12-05 ENCOUNTER — OUTPATIENT (OUTPATIENT)
Dept: OUTPATIENT SERVICES | Facility: HOSPITAL | Age: 69
LOS: 1 days | End: 2024-12-05
Payer: MEDICARE

## 2024-12-05 ENCOUNTER — APPOINTMENT (OUTPATIENT)
Dept: MRI IMAGING | Facility: CLINIC | Age: 69
End: 2024-12-05
Payer: MEDICARE

## 2024-12-05 DIAGNOSIS — Z98.890 OTHER SPECIFIED POSTPROCEDURAL STATES: Chronic | ICD-10-CM

## 2024-12-05 DIAGNOSIS — I48.91 UNSPECIFIED ATRIAL FIBRILLATION: Chronic | ICD-10-CM

## 2024-12-05 DIAGNOSIS — Z00.8 ENCOUNTER FOR OTHER GENERAL EXAMINATION: ICD-10-CM

## 2024-12-05 DIAGNOSIS — Z90.5 ACQUIRED ABSENCE OF KIDNEY: Chronic | ICD-10-CM

## 2024-12-05 PROCEDURE — 73721 MRI JNT OF LWR EXTRE W/O DYE: CPT

## 2024-12-05 PROCEDURE — 73721 MRI JNT OF LWR EXTRE W/O DYE: CPT | Mod: 26,RT

## 2024-12-05 RX ORDER — OXYCODONE AND ACETAMINOPHEN 5; 325 MG/1; MG/1
5-325 TABLET ORAL
Qty: 30 | Refills: 0 | Status: ACTIVE | COMMUNITY
Start: 2024-12-05 | End: 1900-01-01

## 2024-12-05 RX ORDER — TRAMADOL HYDROCHLORIDE 50 MG/1
50 TABLET, COATED ORAL
Qty: 30 | Refills: 0 | Status: DISCONTINUED | COMMUNITY
Start: 2024-12-02 | End: 2024-12-05

## 2024-12-11 ENCOUNTER — APPOINTMENT (OUTPATIENT)
Dept: MRI IMAGING | Facility: CLINIC | Age: 69
End: 2024-12-11

## 2024-12-23 ENCOUNTER — RX RENEWAL (OUTPATIENT)
Age: 69
End: 2024-12-23

## 2025-01-22 NOTE — ASSESSMENT
[FreeTextEntry1] : Labs sent out. Flu vaccine given without reaction. Patient advised to continue present medications with diet and exercise. Patient will follow up with specialists as scheduled. Patient will return to the office in 1month\par \par \par \par 24-hour urine was done 3/17\par specialists include..\par 1. cardiology-Dr. Eckert\par 2. ophthalmology-Dr.Goldberg-overdue " to do"\par 3. podiatry-NO LONGER GOES\par 4. apnea doctor-Dr. Live\par 5. gastro-"-Dr. Sanchez\par 6.-Dr. Michelle\par colonoscopy 3/18\par abdominal sonogram=6/17\par discussed shingles vaccine\par carotid sonogram was 11/16 with +plaque,no stenosis\par Echo was 12/16\par Chest x-ray was done 5/4/18 showing small left effusion=repeat 2/2018=clear\par Hep C screen has been done in the past
show

## 2025-02-12 ENCOUNTER — APPOINTMENT (OUTPATIENT)
Dept: ENDOCRINOLOGY | Facility: CLINIC | Age: 70
End: 2025-02-12
Payer: MEDICARE

## 2025-02-12 VITALS
WEIGHT: 232 LBS | SYSTOLIC BLOOD PRESSURE: 132 MMHG | RESPIRATION RATE: 16 BRPM | HEIGHT: 70 IN | HEART RATE: 66 BPM | DIASTOLIC BLOOD PRESSURE: 82 MMHG | BODY MASS INDEX: 33.21 KG/M2 | OXYGEN SATURATION: 96 %

## 2025-02-12 DIAGNOSIS — E11.9 TYPE 2 DIABETES MELLITUS W/OUT COMPLICATIONS: ICD-10-CM

## 2025-02-12 DIAGNOSIS — K76.0 FATTY (CHANGE OF) LIVER, NOT ELSEWHERE CLASSIFIED: ICD-10-CM

## 2025-02-12 DIAGNOSIS — I10 ESSENTIAL (PRIMARY) HYPERTENSION: ICD-10-CM

## 2025-02-12 PROCEDURE — G2211 COMPLEX E/M VISIT ADD ON: CPT

## 2025-02-12 PROCEDURE — 99214 OFFICE O/P EST MOD 30 MIN: CPT

## 2025-02-12 RX ORDER — SEMAGLUTIDE 1.34 MG/ML
4 INJECTION, SOLUTION SUBCUTANEOUS
Qty: 1 | Refills: 2 | Status: ACTIVE | COMMUNITY
Start: 2025-02-12 | End: 1900-01-01

## 2025-02-13 ENCOUNTER — APPOINTMENT (OUTPATIENT)
Dept: INTERNAL MEDICINE | Facility: CLINIC | Age: 70
End: 2025-02-13
Payer: MEDICARE

## 2025-02-13 VITALS
HEART RATE: 87 BPM | SYSTOLIC BLOOD PRESSURE: 130 MMHG | WEIGHT: 230 LBS | RESPIRATION RATE: 14 BRPM | HEIGHT: 70 IN | OXYGEN SATURATION: 97 % | BODY MASS INDEX: 32.93 KG/M2 | DIASTOLIC BLOOD PRESSURE: 80 MMHG

## 2025-02-13 DIAGNOSIS — K76.0 FATTY (CHANGE OF) LIVER, NOT ELSEWHERE CLASSIFIED: ICD-10-CM

## 2025-02-13 DIAGNOSIS — N18.30 CHRONIC KIDNEY DISEASE, STAGE 3 UNSPECIFIED: ICD-10-CM

## 2025-02-13 DIAGNOSIS — Z79.899 OTHER LONG TERM (CURRENT) DRUG THERAPY: ICD-10-CM

## 2025-02-13 DIAGNOSIS — E66.9 OBESITY, UNSPECIFIED: ICD-10-CM

## 2025-02-13 DIAGNOSIS — E11.65 TYPE 2 DIABETES MELLITUS WITH HYPERGLYCEMIA: ICD-10-CM

## 2025-02-13 DIAGNOSIS — E78.5 HYPERLIPIDEMIA, UNSPECIFIED: ICD-10-CM

## 2025-02-13 PROCEDURE — G2211 COMPLEX E/M VISIT ADD ON: CPT

## 2025-02-13 PROCEDURE — 99214 OFFICE O/P EST MOD 30 MIN: CPT

## 2025-02-14 LAB — TSH SERPL-ACNC: 2.17 UIU/ML

## 2025-02-18 ENCOUNTER — TRANSCRIPTION ENCOUNTER (OUTPATIENT)
Age: 70
End: 2025-02-18

## 2025-03-31 ENCOUNTER — APPOINTMENT (OUTPATIENT)
Dept: OPHTHALMOLOGY | Facility: CLINIC | Age: 70
End: 2025-03-31

## 2025-04-24 ENCOUNTER — APPOINTMENT (OUTPATIENT)
Dept: UROLOGY | Facility: CLINIC | Age: 70
End: 2025-04-24

## 2025-04-24 ENCOUNTER — APPOINTMENT (OUTPATIENT)
Dept: UROLOGY | Facility: CLINIC | Age: 70
End: 2025-04-24
Payer: MEDICARE

## 2025-04-24 ENCOUNTER — OUTPATIENT (OUTPATIENT)
Dept: OUTPATIENT SERVICES | Facility: HOSPITAL | Age: 70
LOS: 1 days | End: 2025-04-24
Payer: MEDICARE

## 2025-04-24 VITALS
SYSTOLIC BLOOD PRESSURE: 146 MMHG | TEMPERATURE: 98.5 F | HEIGHT: 70 IN | BODY MASS INDEX: 32.93 KG/M2 | HEART RATE: 99 BPM | RESPIRATION RATE: 17 BRPM | WEIGHT: 230 LBS | DIASTOLIC BLOOD PRESSURE: 80 MMHG

## 2025-04-24 DIAGNOSIS — I48.91 UNSPECIFIED ATRIAL FIBRILLATION: Chronic | ICD-10-CM

## 2025-04-24 DIAGNOSIS — Z90.5 ACQUIRED ABSENCE OF KIDNEY: Chronic | ICD-10-CM

## 2025-04-24 DIAGNOSIS — C64.2 MALIGNANT NEOPLASM OF LEFT KIDNEY, EXCEPT RENAL PELVIS: ICD-10-CM

## 2025-04-24 DIAGNOSIS — R35.0 FREQUENCY OF MICTURITION: ICD-10-CM

## 2025-04-24 DIAGNOSIS — Z98.890 OTHER SPECIFIED POSTPROCEDURAL STATES: Chronic | ICD-10-CM

## 2025-04-24 PROCEDURE — 99213 OFFICE O/P EST LOW 20 MIN: CPT

## 2025-04-24 PROCEDURE — 76775 US EXAM ABDO BACK WALL LIM: CPT | Mod: 26

## 2025-04-24 PROCEDURE — G2211 COMPLEX E/M VISIT ADD ON: CPT

## 2025-04-24 PROCEDURE — 76775 US EXAM ABDO BACK WALL LIM: CPT

## 2025-04-25 DIAGNOSIS — C64.2 MALIGNANT NEOPLASM OF LEFT KIDNEY, EXCEPT RENAL PELVIS: ICD-10-CM

## 2025-05-16 ENCOUNTER — APPOINTMENT (OUTPATIENT)
Dept: INTERNAL MEDICINE | Facility: CLINIC | Age: 70
End: 2025-05-16
Payer: MEDICARE

## 2025-05-16 VITALS
OXYGEN SATURATION: 96 % | HEART RATE: 76 BPM | BODY MASS INDEX: 31.92 KG/M2 | SYSTOLIC BLOOD PRESSURE: 125 MMHG | HEIGHT: 70 IN | TEMPERATURE: 97.3 F | DIASTOLIC BLOOD PRESSURE: 80 MMHG | WEIGHT: 223 LBS | RESPIRATION RATE: 15 BRPM

## 2025-05-16 DIAGNOSIS — E66.9 OBESITY, UNSPECIFIED: ICD-10-CM

## 2025-05-16 DIAGNOSIS — Z79.899 OTHER LONG TERM (CURRENT) DRUG THERAPY: ICD-10-CM

## 2025-05-16 DIAGNOSIS — Z12.5 ENCOUNTER FOR SCREENING FOR MALIGNANT NEOPLASM OF PROSTATE: ICD-10-CM

## 2025-05-16 DIAGNOSIS — K76.0 FATTY (CHANGE OF) LIVER, NOT ELSEWHERE CLASSIFIED: ICD-10-CM

## 2025-05-16 PROCEDURE — 99214 OFFICE O/P EST MOD 30 MIN: CPT

## 2025-05-16 PROCEDURE — 36415 COLL VENOUS BLD VENIPUNCTURE: CPT

## 2025-05-16 PROCEDURE — G2211 COMPLEX E/M VISIT ADD ON: CPT

## 2025-05-17 LAB
ALBUMIN SERPL ELPH-MCNC: 4.1 G/DL
ALP BLD-CCNC: 98 U/L
ALT SERPL-CCNC: 66 U/L
ANION GAP SERPL CALC-SCNC: 16 MMOL/L
AST SERPL-CCNC: 46 U/L
BASOPHILS # BLD AUTO: 0.06 K/UL
BASOPHILS NFR BLD AUTO: 0.6 %
BILIRUB SERPL-MCNC: 0.3 MG/DL
BUN SERPL-MCNC: 18 MG/DL
CALCIUM SERPL-MCNC: 9.2 MG/DL
CHLORIDE SERPL-SCNC: 108 MMOL/L
CHOLEST SERPL-MCNC: 124 MG/DL
CO2 SERPL-SCNC: 20 MMOL/L
CREAT SERPL-MCNC: 1.79 MG/DL
CREAT SPEC-SCNC: 139 MG/DL
EGFRCR SERPLBLD CKD-EPI 2021: 41 ML/MIN/1.73M2
EOSINOPHIL # BLD AUTO: 0.1 K/UL
EOSINOPHIL NFR BLD AUTO: 1 %
ESTIMATED AVERAGE GLUCOSE: 151 MG/DL
GLUCOSE SERPL-MCNC: 93 MG/DL
HBA1C MFR BLD HPLC: 6.9 %
HCT VFR BLD CALC: 46.8 %
HDLC SERPL-MCNC: 37 MG/DL
HGB BLD-MCNC: 14.5 G/DL
IMM GRANULOCYTES NFR BLD AUTO: 0.6 %
LDLC SERPL-MCNC: 63 MG/DL
LYMPHOCYTES # BLD AUTO: 1.88 K/UL
LYMPHOCYTES NFR BLD AUTO: 18.4 %
MAGNESIUM SERPL-MCNC: 2.1 MG/DL
MAN DIFF?: NORMAL
MCHC RBC-ENTMCNC: 27.1 PG
MCHC RBC-ENTMCNC: 31 G/DL
MCV RBC AUTO: 87.3 FL
MICROALBUMIN 24H UR DL<=1MG/L-MCNC: 29.7 MG/DL
MICROALBUMIN/CREAT 24H UR-RTO: 214 MG/G
MONOCYTES # BLD AUTO: 0.91 K/UL
MONOCYTES NFR BLD AUTO: 8.9 %
NEUTROPHILS # BLD AUTO: 7.2 K/UL
NEUTROPHILS NFR BLD AUTO: 70.5 %
NONHDLC SERPL-MCNC: 88 MG/DL
PLATELET # BLD AUTO: 232 K/UL
POTASSIUM SERPL-SCNC: 4 MMOL/L
PROT SERPL-MCNC: 6.9 G/DL
PSA SERPL-MCNC: 0.94 NG/ML
RBC # BLD: 5.36 M/UL
RBC # FLD: 15 %
SODIUM SERPL-SCNC: 144 MMOL/L
TRIGL SERPL-MCNC: 143 MG/DL
TSH SERPL-ACNC: 2.24 UIU/ML
WBC # FLD AUTO: 10.21 K/UL

## 2025-05-19 ENCOUNTER — APPOINTMENT (OUTPATIENT)
Dept: NEPHROLOGY | Facility: CLINIC | Age: 70
End: 2025-05-19
Payer: MEDICARE

## 2025-05-19 VITALS
OXYGEN SATURATION: 98 % | WEIGHT: 223 LBS | HEART RATE: 77 BPM | HEIGHT: 70 IN | BODY MASS INDEX: 31.92 KG/M2 | SYSTOLIC BLOOD PRESSURE: 138 MMHG | DIASTOLIC BLOOD PRESSURE: 76 MMHG

## 2025-05-19 PROCEDURE — 99213 OFFICE O/P EST LOW 20 MIN: CPT

## 2025-05-21 ENCOUNTER — APPOINTMENT (OUTPATIENT)
Dept: ENDOCRINOLOGY | Facility: CLINIC | Age: 70
End: 2025-05-21
Payer: MEDICARE

## 2025-05-21 VITALS
HEIGHT: 70 IN | BODY MASS INDEX: 32.64 KG/M2 | HEART RATE: 73 BPM | TEMPERATURE: 98 F | SYSTOLIC BLOOD PRESSURE: 128 MMHG | RESPIRATION RATE: 16 BRPM | DIASTOLIC BLOOD PRESSURE: 70 MMHG | OXYGEN SATURATION: 97 % | WEIGHT: 228 LBS

## 2025-05-21 DIAGNOSIS — E11.9 TYPE 2 DIABETES MELLITUS W/OUT COMPLICATIONS: ICD-10-CM

## 2025-05-21 DIAGNOSIS — I10 ESSENTIAL (PRIMARY) HYPERTENSION: ICD-10-CM

## 2025-05-21 DIAGNOSIS — Z79.4 LONG TERM (CURRENT) USE OF INSULIN: ICD-10-CM

## 2025-05-21 DIAGNOSIS — E78.5 HYPERLIPIDEMIA, UNSPECIFIED: ICD-10-CM

## 2025-05-21 PROCEDURE — G2211 COMPLEX E/M VISIT ADD ON: CPT

## 2025-05-21 PROCEDURE — 99214 OFFICE O/P EST MOD 30 MIN: CPT

## 2025-05-21 RX ORDER — EMPAGLIFLOZIN 25 MG/1
25 TABLET, FILM COATED ORAL DAILY
Qty: 90 | Refills: 3 | Status: ACTIVE | COMMUNITY
Start: 2025-05-21 | End: 1900-01-01

## 2025-05-22 ENCOUNTER — APPOINTMENT (OUTPATIENT)
Dept: INTERNAL MEDICINE | Facility: CLINIC | Age: 70
End: 2025-05-22
Payer: MEDICARE

## 2025-05-22 VITALS — SYSTOLIC BLOOD PRESSURE: 130 MMHG | DIASTOLIC BLOOD PRESSURE: 78 MMHG | RESPIRATION RATE: 15 BRPM | HEART RATE: 75 BPM

## 2025-05-22 DIAGNOSIS — Z23 ENCOUNTER FOR IMMUNIZATION: ICD-10-CM

## 2025-05-22 DIAGNOSIS — L03.119 CELLULITIS OF UNSPECIFIED PART OF LIMB: ICD-10-CM

## 2025-05-22 PROCEDURE — 90471 IMMUNIZATION ADMIN: CPT

## 2025-05-22 PROCEDURE — 90715 TDAP VACCINE 7 YRS/> IM: CPT | Mod: GY

## 2025-05-22 PROCEDURE — 99214 OFFICE O/P EST MOD 30 MIN: CPT | Mod: 25

## 2025-05-22 RX ORDER — DOXYCYCLINE 100 MG/1
100 TABLET, FILM COATED ORAL
Qty: 20 | Refills: 0 | Status: ACTIVE | COMMUNITY
Start: 2025-05-22 | End: 1900-01-01

## 2025-05-26 LAB — MICROORGANISM/AGENT SPEC: NORMAL

## 2025-05-28 ENCOUNTER — APPOINTMENT (OUTPATIENT)
Dept: INTERNAL MEDICINE | Facility: CLINIC | Age: 70
End: 2025-05-28
Payer: MEDICARE

## 2025-05-28 VITALS — SYSTOLIC BLOOD PRESSURE: 130 MMHG | DIASTOLIC BLOOD PRESSURE: 80 MMHG | RESPIRATION RATE: 14 BRPM | HEART RATE: 70 BPM

## 2025-05-28 DIAGNOSIS — T14.8XXA OTHER INJURY OF UNSPECIFIED BODY REGION, INITIAL ENCOUNTER: ICD-10-CM

## 2025-05-28 PROCEDURE — 99213 OFFICE O/P EST LOW 20 MIN: CPT

## 2025-05-28 PROCEDURE — G2211 COMPLEX E/M VISIT ADD ON: CPT

## 2025-05-29 ENCOUNTER — TRANSCRIPTION ENCOUNTER (OUTPATIENT)
Age: 70
End: 2025-05-29

## 2025-05-29 ENCOUNTER — RX RENEWAL (OUTPATIENT)
Age: 70
End: 2025-05-29

## 2025-06-19 ENCOUNTER — APPOINTMENT (OUTPATIENT)
Dept: INTERNAL MEDICINE | Facility: CLINIC | Age: 70
End: 2025-06-19
Payer: MEDICARE

## 2025-06-19 VITALS
SYSTOLIC BLOOD PRESSURE: 130 MMHG | WEIGHT: 224 LBS | DIASTOLIC BLOOD PRESSURE: 82 MMHG | RESPIRATION RATE: 13 BRPM | BODY MASS INDEX: 32.14 KG/M2 | HEART RATE: 74 BPM

## 2025-06-19 PROCEDURE — 36415 COLL VENOUS BLD VENIPUNCTURE: CPT

## 2025-06-19 PROCEDURE — G2211 COMPLEX E/M VISIT ADD ON: CPT

## 2025-06-19 PROCEDURE — 99213 OFFICE O/P EST LOW 20 MIN: CPT

## 2025-06-20 ENCOUNTER — LABORATORY RESULT (OUTPATIENT)
Age: 70
End: 2025-06-20

## 2025-06-20 ENCOUNTER — TRANSCRIPTION ENCOUNTER (OUTPATIENT)
Age: 70
End: 2025-06-20

## 2025-06-20 LAB
ALBUMIN SERPL ELPH-MCNC: 3.8 G/DL
ALP BLD-CCNC: 102 U/L
ALT SERPL-CCNC: 111 U/L
AST SERPL-CCNC: 58 U/L
BILIRUB DIRECT SERPL-MCNC: 0.1 MG/DL
BILIRUB INDIRECT SERPL-MCNC: 0.2 MG/DL
BILIRUB SERPL-MCNC: 0.3 MG/DL
PROT SERPL-MCNC: 6.6 G/DL

## 2025-06-22 LAB
HAV IGM SER QL: NONREACTIVE
HBV CORE IGM SER QL: NONREACTIVE
HBV SURFACE AG SER QL: NONREACTIVE
HCV AB SER QL: REACTIVE
HCV S/CO RATIO: 14.69 S/CO

## 2025-06-24 ENCOUNTER — APPOINTMENT (OUTPATIENT)
Dept: ULTRASOUND IMAGING | Facility: CLINIC | Age: 70
End: 2025-06-24

## 2025-06-24 ENCOUNTER — OUTPATIENT (OUTPATIENT)
Dept: OUTPATIENT SERVICES | Facility: HOSPITAL | Age: 70
LOS: 1 days | End: 2025-06-24
Payer: MEDICARE

## 2025-06-24 DIAGNOSIS — I48.91 UNSPECIFIED ATRIAL FIBRILLATION: Chronic | ICD-10-CM

## 2025-06-24 DIAGNOSIS — Z90.5 ACQUIRED ABSENCE OF KIDNEY: Chronic | ICD-10-CM

## 2025-06-24 DIAGNOSIS — R79.89 OTHER SPECIFIED ABNORMAL FINDINGS OF BLOOD CHEMISTRY: ICD-10-CM

## 2025-06-24 PROCEDURE — 76700 US EXAM ABDOM COMPLETE: CPT

## 2025-06-24 PROCEDURE — 76700 US EXAM ABDOM COMPLETE: CPT | Mod: 26

## 2025-07-15 ENCOUNTER — APPOINTMENT (OUTPATIENT)
Dept: GASTROENTEROLOGY | Facility: CLINIC | Age: 70
End: 2025-07-15
Payer: MEDICARE

## 2025-07-15 ENCOUNTER — NON-APPOINTMENT (OUTPATIENT)
Age: 70
End: 2025-07-15

## 2025-07-15 VITALS
WEIGHT: 222 LBS | OXYGEN SATURATION: 98 % | RESPIRATION RATE: 14 BRPM | HEART RATE: 73 BPM | BODY MASS INDEX: 31.78 KG/M2 | HEIGHT: 70 IN | DIASTOLIC BLOOD PRESSURE: 100 MMHG | SYSTOLIC BLOOD PRESSURE: 160 MMHG

## 2025-07-15 PROBLEM — Z86.0100 HISTORY OF COLON POLYPS: Status: ACTIVE | Noted: 2025-07-15

## 2025-07-15 PROBLEM — Z83.719 FAMILY HISTORY OF COLONIC POLYPS: Status: ACTIVE | Noted: 2017-03-27

## 2025-07-15 PROBLEM — Z80.0 FAMILY HISTORY OF MALIGNANT NEOPLASM OF COLON: Status: ACTIVE | Noted: 2025-07-15

## 2025-07-15 PROBLEM — Z12.11 COLON CANCER SCREENING: Status: ACTIVE | Noted: 2025-07-15

## 2025-07-15 PROCEDURE — 99204 OFFICE O/P NEW MOD 45 MIN: CPT

## 2025-07-15 RX ORDER — SODIUM SULFATE, POTASSIUM SULFATE AND MAGNESIUM SULFATE 1.6; 3.13; 17.5 G/177ML; G/177ML; G/177ML
17.5-3.13-1.6 SOLUTION ORAL
Qty: 1 | Refills: 0 | Status: ACTIVE | COMMUNITY
Start: 2025-07-15 | End: 1900-01-01

## 2025-07-23 ENCOUNTER — NON-APPOINTMENT (OUTPATIENT)
Age: 70
End: 2025-07-23

## 2025-08-25 ENCOUNTER — APPOINTMENT (OUTPATIENT)
Dept: ENDOCRINOLOGY | Facility: CLINIC | Age: 70
End: 2025-08-25
Payer: MEDICARE

## 2025-08-25 VITALS
DIASTOLIC BLOOD PRESSURE: 62 MMHG | TEMPERATURE: 98 F | HEIGHT: 70 IN | BODY MASS INDEX: 31.78 KG/M2 | OXYGEN SATURATION: 97 % | WEIGHT: 222 LBS | SYSTOLIC BLOOD PRESSURE: 122 MMHG | HEART RATE: 82 BPM | RESPIRATION RATE: 16 BRPM

## 2025-08-25 DIAGNOSIS — E11.65 TYPE 2 DIABETES MELLITUS WITH HYPERGLYCEMIA: ICD-10-CM

## 2025-08-25 DIAGNOSIS — I10 ESSENTIAL (PRIMARY) HYPERTENSION: ICD-10-CM

## 2025-08-25 DIAGNOSIS — E78.5 HYPERLIPIDEMIA, UNSPECIFIED: ICD-10-CM

## 2025-08-25 PROCEDURE — G2211 COMPLEX E/M VISIT ADD ON: CPT

## 2025-08-25 PROCEDURE — 99214 OFFICE O/P EST MOD 30 MIN: CPT

## 2025-08-25 RX ORDER — SACUBITRIL AND VALSARTAN 24; 26 MG/1; MG/1
24-26 TABLET, FILM COATED ORAL TWICE DAILY
Refills: 0 | Status: ACTIVE | COMMUNITY

## 2025-09-05 ENCOUNTER — APPOINTMENT (OUTPATIENT)
Dept: GASTROENTEROLOGY | Facility: CLINIC | Age: 70
End: 2025-09-05
Payer: MEDICARE

## 2025-09-05 DIAGNOSIS — E11.9 TYPE 2 DIABETES MELLITUS W/OUT COMPLICATIONS: ICD-10-CM

## 2025-09-05 DIAGNOSIS — R79.89 OTHER SPECIFIED ABNORMAL FINDINGS OF BLOOD CHEMISTRY: ICD-10-CM

## 2025-09-05 DIAGNOSIS — E66.9 OBESITY, UNSPECIFIED: ICD-10-CM

## 2025-09-05 DIAGNOSIS — K76.0 FATTY (CHANGE OF) LIVER, NOT ELSEWHERE CLASSIFIED: ICD-10-CM

## 2025-09-05 DIAGNOSIS — I10 ESSENTIAL (PRIMARY) HYPERTENSION: ICD-10-CM

## 2025-09-05 DIAGNOSIS — E78.5 HYPERLIPIDEMIA, UNSPECIFIED: ICD-10-CM

## 2025-09-05 PROCEDURE — 76981 USE PARENCHYMA: CPT

## 2025-09-10 ENCOUNTER — NON-APPOINTMENT (OUTPATIENT)
Age: 70
End: 2025-09-10

## 2025-09-25 PROBLEM — K64.4 EXTERNAL HEMORRHOIDS: Status: ACTIVE | Noted: 2025-09-25

## 2025-09-25 PROBLEM — D12.7 ADENOMA OF RECTOSIGMOID JUNCTION OF LARGE INTESTINE: Status: ACTIVE | Noted: 2025-09-25

## 2025-09-25 PROBLEM — Z80.0 FAMILY HISTORY OF COLON CANCER: Status: ACTIVE | Noted: 2025-09-25
